# Patient Record
Sex: FEMALE | Race: WHITE | NOT HISPANIC OR LATINO | Employment: FULL TIME | URBAN - METROPOLITAN AREA
[De-identification: names, ages, dates, MRNs, and addresses within clinical notes are randomized per-mention and may not be internally consistent; named-entity substitution may affect disease eponyms.]

---

## 2017-10-26 ENCOUNTER — HOSPITAL ENCOUNTER (EMERGENCY)
Facility: MEDICAL CENTER | Age: 42
End: 2017-10-26
Attending: EMERGENCY MEDICINE
Payer: COMMERCIAL

## 2017-10-26 VITALS
SYSTOLIC BLOOD PRESSURE: 102 MMHG | OXYGEN SATURATION: 98 % | WEIGHT: 166.23 LBS | TEMPERATURE: 97.4 F | BODY MASS INDEX: 25.19 KG/M2 | HEIGHT: 68 IN | RESPIRATION RATE: 16 BRPM | HEART RATE: 60 BPM | DIASTOLIC BLOOD PRESSURE: 52 MMHG

## 2017-10-26 LAB — GFR SERPL CREATININE-BSD FRML MDRD: >60 ML/MIN/1.73 M 2

## 2017-10-26 PROCEDURE — 80053 COMPREHEN METABOLIC PANEL: CPT

## 2017-10-26 PROCEDURE — 99284 EMERGENCY DEPT VISIT MOD MDM: CPT

## 2017-10-26 PROCEDURE — 87389 HIV-1 AG W/HIV-1&-2 AB AG IA: CPT

## 2017-10-26 PROCEDURE — 87340 HEPATITIS B SURFACE AG IA: CPT

## 2017-10-26 PROCEDURE — 86803 HEPATITIS C AB TEST: CPT

## 2017-10-26 PROCEDURE — 86706 HEP B SURFACE ANTIBODY: CPT

## 2017-10-26 PROCEDURE — 85027 COMPLETE CBC AUTOMATED: CPT

## 2017-10-26 PROCEDURE — 86704 HEP B CORE ANTIBODY TOTAL: CPT

## 2017-10-26 PROCEDURE — 36415 COLL VENOUS BLD VENIPUNCTURE: CPT

## 2017-10-26 ASSESSMENT — ENCOUNTER SYMPTOMS: FEVER: 0

## 2017-10-26 NOTE — LETTER
"  FORM C-4:  EMPLOYEE’S CLAIM FOR COMPENSATION/ REPORT OF INITIAL TREATMENT  EMPLOYEE’S CLAIM - PROVIDE ALL INFORMATION REQUESTED   First Name  Madelyn Last Name  Clarisse Birthdate             Age  1975 42 y.o. Sex  female Claim Number   Home Employee Address  [address]  [facility] [facility]                                     Zip  [address] Height  1.727 m (5' 8\") Weight  75.4 kg (166 lb 3.6 oz) [facility]     Mailing Employee Address                           [address]   [facility] [facility]               Zip  [address] Telephone  [phone] (home)  Primary Language Spoken  ENGLISH   Insurer  Unable to Obtain Third Party   MISC WORKERS COMP Employee's Occupation (Job Title) When Injury or Occupational Disease Occurred  All Positions   Employer's Name  [employer] Telephone  [phone]    Employer Address  [address] [employer] [place] [facility] Zip  [address]   Date of Injury  10/26/2017       Hour of Injury  8:20 PM Date Employer Notified  10/26/2017 Last Day of Work after Injury or Occupational Disease  10/26/2017 Supervisor to Whom Injury Reported  n/a   Address or Location of Accident (if applicable)  [employer] ([place]   What were you doing at the time of accident? (if applicable)  Changing Trashes    How did this injury or occupational disease occur? Be specific and answer in detail. Use additional sheet if necessary)  syringe stuck me under nail   If you believe that you have an occupational disease, when did you first have knowledge of the disability and it relationship to your employment?  n/a Witnesses to the Accident  n/a     Nature of Injury or Occupational Disease  Puncture  Part(s) of Body Injured or Affected  Finger (L), N/A, N/A    I certify that the above is true and correct to the best of my knowledge and that I have provided this information in order to obtain the benefits of [place]  I hereby authorize any physician, chiropractor, surgeon, practitioner, or other person, any hospital, including [facility] or [facility] hospital, any medical service organization, any insurance company, or other institution or organization to release to each other, any medical or other information, including benefits paid or payable, pertinent to this injury or disease, except information relative to diagnosis, treatment and/or counseling for AIDS, psychological conditions, alcohol or controlled substances, for which I must give specific authorization.  A Photostat of this authorization shall be as valid as the original.   Date  10/26/17 Place  [facility] Employee’s Signature   THIS REPORT MUST BE COMPLETED AND MAILED WITHIN 3 WORKING DAYS OF TREATMENT   Place  [facility], EMERGENCY DEPT  Name of Facility   [facility]   Date  10/26/2017 Diagnosis  (W27.3XXA) Needlestick injury accident, initial encounter Is there evidence the injured employee was under the influence of alcohol and/or another controlled substance at the time of accident?   Hour  11:05 PM Description of Injury or Disease  Needlestick injury accident, initial encounter No   Treatment  labs  Have you advised the patient to remain off work five days or more?         No   X-Ray Findings      If Yes   From Date    To Date      From information given by the employee, together with medical evidence, can you directly connect this injury or occupational disease as job incurred?  Yes If No, is the employee capable of: Full Duty  Yes Modified Duty      Is additional medical care by a physician indicated?  Yes If Modified Duty, Specify any Limitations / Restrictions        Do you know of any previous injury or disease contributing to this condition or occupational disease?  No   Date  10/26/2017 Print Doctor’s Name  Aileen Willard I certify the employer’s copy of  "this form was mailed on:   Address  [address]  [place] [id number] [phone] Insurer’s Use Only   [facility] Zip  [address]    Provider’s Tax ID Number    Telephone  Dept: [phone]    Doctor’s Signature  e-SignMUMARY MIN M.D. Degree   MD    Original - TREATING PHYSICIAN OR CHIROPRACTOR   Pg 2-Insurer/TPA   Pg 3-Employer   Pg 4-Employee                                                                                                  Form C-4 (rev01/03)     BRIEF DESCRIPTION OF RIGHTS AND BENEFITS  (Pursuant to [place] 616[place])    Notice of Injury or Occupational Disease (Incident Report Form C-1): If an injury or occupational disease (OD) arises out of and in the course of employment, you must provide written notice to your employer as soon as practicable, but no later than 7 days after the accident or OD. Your employer shall maintain a sufficient supply of the required forms.    Claim for Compensation (Form C-4): If medical treatment is sought, the form C-4 is available at the place of initial treatment. A completed \"Claim for Compensation\" (Form C-4) must be filed within 90 days after an accident or OD. The treating physician or chiropractor must, within 3 working days after treatment, complete and mail to the employer, the employer's insurer and third-party , the Claim for Compensation.    Medical Treatment: If you require medical treatment for your on-the-job injury or OD, you may be required to select a physician or chiropractor from a list provided by your workers’ compensation insurer, if it has contracted with an Organization for Managed Care (MCO) or Preferred Provider Organization (PPO) or providers of health care. If your employer has not entered into a contract with an MCO or PPO, you may select a physician or chiropractor from the Panel of Physicians and Chiropractors. Any medical costs related to your industrial injury or OD will be paid by your " insurer.    Temporary Total Disability (TTD): If your doctor has certified that you are unable to work for a period of at least 5 consecutive days, or 5 cumulative days in a 20-day period, or places restrictions on you that your employer does not accommodate, you may be entitled to TTD compensation.    Temporary Partial Disability (TPD): If the wage you receive upon reemployment is less than the compensation for TTD to which you are entitled, the insurer may be required to pay you TPD compensation to make up the difference. TPD can only be paid for a maximum of 24 months.    Permanent Partial Disability (PPD): When your medical condition is stable and there is an indication of a PPD as a result of your injury or OD, within 30 days, your insurer must arrange for an evaluation by a rating physician or chiropractor to determine the degree of your PPD. The amount of your PPD award depends on the date of injury, the results of the PPD evaluation and your age and wage.    Permanent Total Disability (PTD): If you are medically certified by a treating physician or chiropractor as permanently and totally disabled and have been granted a PTD status by your insurer, you are entitled to receive monthly benefits not to exceed 66 2/3% of your average monthly wage. The amount of your PTD payments is subject to reduction if you previously received a PPD award.    Vocational Rehabilitation Services: You may be eligible for vocational rehabilitation services if you are unable to return to the job due to a permanent physical impairment or permanent restrictions as a result of your injury or occupational disease.    Transportation and Per Joslyn Reimbursement: You may be eligible for travel expenses and per joslyn associated with medical treatment.  Reopening: You may be able to reopen your claim if your condition worsens after claim closure.    Appeal Process: If you disagree with a written determination issued by the insurer or the  insurer does not respond to your request, you may appeal to the Department of Administration, , by following the instructions contained in your determination letter. You must appeal the determination within 70 days from the date of the determination letter at [address], [address], [place], [place] [address], or [address], [address], [place], [place] [address]. If you disagree with the  decision, you may appeal to the Department of Administration, . You must file your appeal within 30 days from the date of the  decision letter at [address], [address], [place], [place] [address], or [address], [address], [place], [place] [address]. If you disagree with a decision of an , you may file a petition for judicial review with the District Court. You must do so within 30 days of the Appeal Officer’s decision. You may be represented by an  at your own expense or you may contact the [facility] for possible representation.    [place]  for Injured Workers (NAIW): If you disagree with a  decision, you may request that NAIW represent you without charge at an  Hearing. For information regarding denial of benefits, you may contact the [facility] at: [address], [address], [place] [address], [phone], or [address], [place] [address], [phone]    To File a Complaint with the Division: If you wish to file a complaint with the  of the Division of Industrial Relations (DIR), please contact the Workers’ Compensation Section, [address], [address], [place], [place] [address], telephone [phone], or [address], [place] [address], telephone [phone].    For assistance with Workers’ Compensation Issues: you may contact the Office of the [facility] Consumer Health Assistance, [address], [address], [place], [place] [address], Toll Free 1-987.466.3496, Web site: http://gómez.[facility].[email or web address]., E-mail [email or web address].                                                                                                                                                                               __________________________________________________________________                                    _________________            Employee Name / Signature                                                                                                                            Date                                       D-2 (rev. 10/07)

## 2017-10-27 LAB
ALBUMIN SERPL BCP-MCNC: 4 G/DL (ref 3.2–4.9)
ALBUMIN/GLOB SERPL: 1.5 G/DL
ALP SERPL-CCNC: 67 U/L (ref 30–99)
ALT SERPL-CCNC: <5 U/L (ref 2–50)
ANION GAP SERPL CALC-SCNC: 9 MMOL/L (ref 0–11.9)
AST SERPL-CCNC: 10 U/L (ref 12–45)
BILIRUB SERPL-MCNC: 0.3 MG/DL (ref 0.1–1.5)
BUN SERPL-MCNC: 8 MG/DL (ref 8–22)
CALCIUM SERPL-MCNC: 9 MG/DL (ref 8.5–10.5)
CHLORIDE SERPL-SCNC: 106 MMOL/L (ref 96–112)
CO2 SERPL-SCNC: 22 MMOL/L (ref 20–33)
CREAT SERPL-MCNC: 0.64 MG/DL (ref 0.5–1.4)
ERYTHROCYTE [DISTWIDTH] IN BLOOD BY AUTOMATED COUNT: 35.4 FL (ref 35.9–50)
GLOBULIN SER CALC-MCNC: 2.6 G/DL (ref 1.9–3.5)
GLUCOSE SERPL-MCNC: 100 MG/DL (ref 65–99)
HBV CORE AB SERPL QL IA: NEGATIVE
HBV SURFACE AB SERPL IA-ACNC: <3.1 MIU/ML (ref 0–10)
HBV SURFACE AG SER QL: NEGATIVE
HCT VFR BLD AUTO: 33.1 % (ref 37–47)
HCV AB SER QL: NEGATIVE
HGB BLD-MCNC: 10.3 G/DL (ref 12–16)
HIV 1+2 AB+HIV1 P24 AG SERPL QL IA: NON REACTIVE
MCH RBC QN AUTO: 20.5 PG (ref 27–33)
MCHC RBC AUTO-ENTMCNC: 31.1 G/DL (ref 33.6–35)
MCV RBC AUTO: 65.9 FL (ref 81.4–97.8)
PLATELET # BLD AUTO: 191 K/UL (ref 164–446)
PMV BLD AUTO: 10 FL (ref 9–12.9)
POTASSIUM SERPL-SCNC: 4.1 MMOL/L (ref 3.6–5.5)
PROT SERPL-MCNC: 6.6 G/DL (ref 6–8.2)
RBC # BLD AUTO: 5.02 M/UL (ref 4.2–5.4)
SODIUM SERPL-SCNC: 137 MMOL/L (ref 135–145)
WBC # BLD AUTO: 4.6 K/UL (ref 4.8–10.8)

## 2017-10-27 NOTE — ED PROVIDER NOTES
ED Provider Note    Scribed for Aileen Willard M.D. by Shahnaz Miller. 10/26/2017, 10:17 PM.    Means of arrival: Walk-in  History obtained from: Patient  History limited by: None    CHIEF COMPLAINT  Chief Complaint   Patient presents with   • Body Fluid Exposure     bathroom trash at work, pt saw the needle        HPI  Madelyn Robb is a 42 y.o. female who presents to the Emergency Department with a possible body fluid exposure. The patient works at [employer] and was gathering trash bags from the bathroom at work when she was stuck under her right third fingernail with a needle. The patient felt the needle stick and then saw a syringe in the trash.She did not bleed herself. She immediately removed her gloves, flushed the area with hot water, and washed with soap. She denies fever, rash, or other symptoms. She has no underlying known hepatitis or HIV.    REVIEW OF SYSTEMS  Review of Systems   Constitutional: Negative for fever.   Skin: Negative for rash.        Positive for needle stick   E.    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    SURGICAL HISTORY   has a past surgical history that includes open reduction; appendectomy; abdominal exploration; cholecystectomy; and tubal coagulation laparoscopic bilateral.    SOCIAL HISTORY  Social History   Substance Use Topics   • Smoking status: Current Every Day Smoker     Packs/day: 0.50      "Years: 25.00   • Smokeless tobacco: Not on file   • Alcohol use No      Comment: denies h/o heavy use      History   Drug Use No     Comment: denies h/o heroin, cocaine, heroin, IVDU       FAMILY HISTORY  Family History   Problem Relation Age of Onset   • Psychiatry Mother    • Alcohol/Drug Mother    • Hypertension Mother    • Cancer Father        CURRENT MEDICATIONS  Reviewed.  See Encounter Summary.     ALLERGIES  Allergies   Allergen Reactions   • Dilantin [Phenytoin Sodium]    • Flexeril [Cyclobenzaprine Hcl]    • Keflex    • Methadone    • Mushroom Extract Complex    • Penicillins    • Rondec    • Sulfa Drugs    • Tegretol [Carbamazepine]    • Ultracef [Cefadroxil Monohydrate]        PHYSICAL EXAM  VITAL SIGNS: /76   Pulse 76   Temp 36.3 °C (97.4 °F) (Temporal)   Resp 16   Ht 1.727 m (5' 8\")   Wt 75.4 kg (166 lb 3.6 oz)   SpO2 98%   BMI 25.27 kg/m²   Vitals reviewed by myself.  Physical Exam  Nursing note and vitals reviewed.  Constitutional: Well-developed and well-nourished. No acute distress.   HENT: Head is normocephalic and atraumatic.  Eyes: extra-ocular movements intact  Cardiovascular: Regular rate and regular rhythm. No murmur heard.  Pulmonary/Chest: Breath sounds normal. No wheezes or rales.   Neurological: Awake and alert  Skin: Skin is warm and dry. No rash. No obvious injuries noted       DIAGNOSTIC STUDIES /  LABS  Labs pending    REASSESSMENT  10:17 PM Patient seen and examined at bedside. I explained to the patient I will order the body fluid exposure protocol labs to evaluate, and she will be discharged home. She consented to this plan of care.    COURSE & MEDICAL DECISION MAKING  Nursing notes, VS, PMSFHx reviewed in chart.    Patient is a 42-year-old female who comes in for needle stick injury. Differential diagnosis includes HIV, hepatitis, needlestick injury. Diagnostic workup includes bodily fluid exposure labs.    Patient's initial vitals are within normal limits. She has no " obvious signs of trauma on her digits. I advised patient that we'll draw bodily fluid exposure labs, and that she will require follow-up with occupational health. Patient has to work tomorrow and does not want to wait for the results of her labs, but agrees to follow-up with occupational health. Therefore labs are drawn and patient is discharged home. She is given strict return fashions prior to discharge. I was able to follow-up with her labs after patient was discharged and noted that her hepatitis panel and HIV were negative.    The patient will return for new or worsening symptoms and is stable at the time of discharge.    The patient is referred to a primary physician for blood pressure management, diabetic screening, and for all other preventative health concerns.    DISPOSITION:  Patient will be discharged home in stable condition.    FOLLOW UP:  [address] [address]  [phone]      call tomorrow to schedule follow-up appointment        FINAL IMPRESSION  1. Needlestick injury accident, initial encounter          IShahnaz (Scribe), am scribing for, and in the presence of, Aileen Willard M.D..    Electronically signed by: Shahnaz Miller (Cherrieibciara), 10/26/2017    IAileen M.D. personally performed the services described in this documentation, as scribed by Shahnaz Miller in my presence, and it is both accurate and complete.    The note accurately reflects work and decisions made by me.  Aileen Willard  10/27/2017  3:19 AM

## 2017-10-27 NOTE — ED NOTES
D/C home with written and verbal instructions re: Rx, activity, f/u.  Verbalizes understanding. Given work note and follow up information. Ambulated out

## 2017-10-27 NOTE — ED NOTES
"Triage notes    Pt was stuck with a needle when taking at trash out work states she saw the needle. Pt has workers comp paperwork .     .  Chief Complaint   Patient presents with   • Body Fluid Exposure     bathroom trash at work, pt saw the needle      ./76   Pulse 76   Temp 36.3 °C (97.4 °F) (Temporal)   Resp 16   Ht 1.727 m (5' 8\")   Wt 75.4 kg (166 lb 3.6 oz)   SpO2 98%   BMI 25.27 kg/m²     "

## 2017-11-14 ENCOUNTER — HOSPITAL ENCOUNTER (EMERGENCY)
Facility: MEDICAL CENTER | Age: 42
End: 2017-11-14
Attending: EMERGENCY MEDICINE
Payer: MEDICAID

## 2017-11-14 VITALS
HEART RATE: 71 BPM | TEMPERATURE: 97.4 F | SYSTOLIC BLOOD PRESSURE: 107 MMHG | OXYGEN SATURATION: 96 % | BODY MASS INDEX: 23.87 KG/M2 | HEIGHT: 69 IN | RESPIRATION RATE: 18 BRPM | DIASTOLIC BLOOD PRESSURE: 65 MMHG | WEIGHT: 161.16 LBS

## 2017-11-14 DIAGNOSIS — I87.2 VENOUS STASIS DERMATITIS OF LEFT LOWER EXTREMITY: ICD-10-CM

## 2017-11-14 PROCEDURE — 99283 EMERGENCY DEPT VISIT LOW MDM: CPT

## 2017-11-14 ASSESSMENT — PAIN SCALES - GENERAL: PAINLEVEL_OUTOF10: 3

## 2017-11-14 ASSESSMENT — LIFESTYLE VARIABLES: DO YOU DRINK ALCOHOL: NO

## 2017-11-14 ASSESSMENT — ENCOUNTER SYMPTOMS
FEVER: 0
FOCAL WEAKNESS: 1

## 2017-11-14 NOTE — ED NOTES
Chief Complaint   Patient presents with   • Leg Pain     Pt reports to have been at [facility] for left lower leg redness/swelling three days ago. pt reports being placed on cephalexin, pt reports now feeling dry skin all over/ no relief with moisterizers/ pt has left leg graft 89'   • Leg Swelling     Explained to pt triage process, made pt aware to tell this RN of any changes/concerns, pt verbalized understanding of process and instructions given. Pt to ER lobby.

## 2017-11-14 NOTE — DISCHARGE INSTRUCTIONS
Venous Stasis or Chronic Venous Insufficiency  Chronic venous insufficiency, also called venous stasis, is a condition that affects the veins in the legs. The condition prevents blood from being pumped through these veins effectively. Blood may no longer be pumped effectively from the legs back to the heart. This condition can range from mild to severe. With proper treatment, you should be able to continue with an active life.  CAUSES   Chronic venous insufficiency occurs when the vein walls become stretched, weakened, or damaged or when valves within the vein are damaged. Some common causes of this include:  · High blood pressure inside the veins (venous hypertension).  · Increased blood pressure in the leg veins from long periods of sitting or standing.  · A blood clot that blocks blood flow in a vein (deep vein thrombosis).  · Inflammation of a superficial vein (phlebitis) that causes a blood clot to form.  RISK FACTORS  Various things can make you more likely to develop chronic venous insufficiency, including:  · Family history of this condition.  · Obesity.  · Pregnancy.  · Sedentary lifestyle.  · Smoking.  · Jobs requiring long periods of standing or sitting in one place.  · Being a certain age. Women in their 40s and 50s and men in their 70s are more likely to develop this condition.  SIGNS AND SYMPTOMS   Symptoms may include:   · Varicose veins.  · Skin breakdown or ulcers.  · Reddened or discolored skin on the leg.  · Brown, smooth, tight, and painful skin just above the ankle, usually on the inside surface (lipodermatosclerosis).  · Swelling.  DIAGNOSIS   To diagnose this condition, your health care provider will take a medical history and do a physical exam. The following tests may be ordered to confirm the diagnosis:  · Duplex ultrasound--A procedure that produces a picture of a blood vessel and nearby organs and also provides information on blood flow through the blood vessel.  · Plethysmography--A  "procedure that tests blood flow.  · A venogram, or venography--A procedure used to look at the veins using X-ray and dye.  TREATMENT  The goals of treatment are to help you return to an active life and to minimize pain or disability. Treatment will depend on the severity of the condition. Medical procedures may be needed for severe cases. Treatment options may include:   · Use of compression stockings. These can help with symptoms and lower the chances of the problem getting worse, but they do not cure the problem.  · Sclerotherapy--A procedure involving an injection of a material that \"dissolves\" the damaged veins. Other veins in the network of blood vessels take over the function of the damaged veins.  · Surgery to remove the vein or cut off blood flow through the vein (vein stripping or laser ablation surgery).  · Surgery to repair a valve.  HOME CARE INSTRUCTIONS   · Wear compression stockings as directed by your health care provider.  · Only take over-the-counter or prescription medicines for pain, discomfort, or fever as directed by your health care provider.  · Follow up with your health care provider as directed.  SEEK MEDICAL CARE IF:   · You have redness, swelling, or increasing pain in the affected area.  · You see a red streak or line that extends up or down from the affected area.  · You have a breakdown or loss of skin in the affected area, even if the breakdown is small.  · You have an injury to the affected area.  SEEK IMMEDIATE MEDICAL CARE IF:   · You have an injury and open wound in the affected area.  · Your pain is severe and does not improve with medicine.  · You have sudden numbness or weakness in the foot or ankle below the affected area, or you have trouble moving your foot or ankle.  · You have a fever or persistent symptoms for more than 2-3 days.  · You have a fever and your symptoms suddenly get worse.  MAKE SURE YOU:   · Understand these instructions.  · Will watch your " condition.  · Will get help right away if you are not doing well or get worse.     This information is not intended to replace advice given to you by your health care provider. Make sure you discuss any questions you have with your health care provider.     Document Released: 04/22/2008 Document Revised: 10/08/2014 Document Reviewed: 08/25/2014  [employer] Patient Education ©2016 [employer]  Contact Dermatitis  Contact dermatitis is a reaction to certain substances that touch the skin. Contact dermatitis can be either irritant contact dermatitis or allergic contact dermatitis. Irritant contact dermatitis does not require previous exposure to the substance for a reaction to occur. Allergic contact dermatitis only occurs if you have been exposed to the substance before. Upon a repeat exposure, your body reacts to the substance.   CAUSES   Many substances can cause contact dermatitis. Irritant dermatitis is most commonly caused by repeated exposure to mildly irritating substances, such as:  · Makeup.  · Soaps.  · Detergents.  · Bleaches.  · Acids.  · Metal salts, such as nickel.  Allergic contact dermatitis is most commonly caused by exposure to:  · Poisonous plants.  · Chemicals (deodorants, shampoos).  · Jewelry.  · Latex.  · Neomycin in triple antibiotic cream.  · Preservatives in products, including clothing.  SYMPTOMS   The area of skin that is exposed may develop:  · Dryness or flaking.  · Redness.  · Cracks.  · Itching.  · Pain or a burning sensation.  · Blisters.  With allergic contact dermatitis, there may also be swelling in areas such as the eyelids, mouth, or genitals.   DIAGNOSIS   Your caregiver can usually tell what the problem is by doing a physical exam. In cases where the cause is uncertain and an allergic contact dermatitis is suspected, a patch skin test may be performed to help determine the cause of your dermatitis.  TREATMENT  Treatment includes protecting the skin from further  "contact with the irritating substance by avoiding that substance if possible. Barrier creams, powders, and gloves may be helpful. Your caregiver may also recommend:  · Steroid creams or ointments applied 2 times daily. For best results, soak the rash area in cool water for 20 minutes. Then apply the medicine. Cover the area with a plastic wrap. You can store the steroid cream in the refrigerator for a \"chilly\" effect on your rash. That may decrease itching. Oral steroid medicines may be needed in more severe cases.  · Antibiotics or antibacterial ointments if a skin infection is present.  · Antihistamine lotion or an antihistamine taken by mouth to ease itching.  · Lubricants to keep moisture in your skin.  · Burow's solution to reduce redness and soreness or to dry a weeping rash. Mix one packet or tablet of solution in 2 cups cool water. Dip a clean washcloth in the mixture, wring it out a bit, and put it on the affected area. Leave the cloth in place for 30 minutes. Do this as often as possible throughout the day.  · Taking several cornstarch or baking soda baths daily if the area is too large to cover with a washcloth.  Harsh chemicals, such as alkalis or acids, can cause skin damage that is like a burn. You should flush your skin for 15 to 20 minutes with cold water after such an exposure. You should also seek immediate medical care after exposure. Bandages (dressings), antibiotics, and pain medicine may be needed for severely irritated skin.   HOME CARE INSTRUCTIONS  · Avoid the substance that caused your reaction.  · Keep the area of skin that is affected away from hot water, soap, sunlight, chemicals, acidic substances, or anything else that would irritate your skin.  · Do not scratch the rash. Scratching may cause the rash to become infected.  · You may take cool baths to help stop the itching.  · Only take over-the-counter or prescription medicines as directed by your caregiver.  · See your caregiver for " follow-up care as directed to make sure your skin is healing properly.  SEEK MEDICAL CARE IF:   · Your condition is not better after 3 days of treatment.  · You seem to be getting worse.  · You see signs of infection such as swelling, tenderness, redness, soreness, or warmth in the affected area.  · You have any problems related to your medicines.     This information is not intended to replace advice given to you by your health care provider. Make sure you discuss any questions you have with your health care provider.     Document Released: 12/15/2001 Document Revised: 03/11/2013 Document Reviewed: 05/22/2012  [employer] Patient Education ©2016 [employer]
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
## 2018-01-31 ENCOUNTER — HOSPITAL ENCOUNTER (EMERGENCY)
Facility: MEDICAL CENTER | Age: 43
End: 2018-01-31
Payer: MEDICAID

## 2018-01-31 VITALS
WEIGHT: 155.65 LBS | TEMPERATURE: 97.5 F | HEIGHT: 68 IN | HEART RATE: 65 BPM | SYSTOLIC BLOOD PRESSURE: 116 MMHG | RESPIRATION RATE: 16 BRPM | OXYGEN SATURATION: 95 % | BODY MASS INDEX: 23.59 KG/M2 | DIASTOLIC BLOOD PRESSURE: 69 MMHG

## 2018-01-31 LAB
ALBUMIN SERPL BCP-MCNC: 4.6 G/DL (ref 3.2–4.9)
ALBUMIN/GLOB SERPL: 2.4 G/DL
ALP SERPL-CCNC: 85 U/L (ref 30–99)
ALT SERPL-CCNC: 9 U/L (ref 2–50)
ANION GAP SERPL CALC-SCNC: 10 MMOL/L (ref 0–11.9)
APTT PPP: 20 SEC (ref 24.7–36)
AST SERPL-CCNC: 18 U/L (ref 12–45)
BASOPHILS # BLD AUTO: 0.7 % (ref 0–1.8)
BASOPHILS # BLD: 0.03 K/UL (ref 0–0.12)
BILIRUB SERPL-MCNC: 0.5 MG/DL (ref 0.1–1.5)
BNP SERPL-MCNC: 7 PG/ML (ref 0–100)
BUN SERPL-MCNC: 6 MG/DL (ref 8–22)
CALCIUM SERPL-MCNC: 8.7 MG/DL (ref 8.5–10.5)
CHLORIDE SERPL-SCNC: 108 MMOL/L (ref 96–112)
CO2 SERPL-SCNC: 18 MMOL/L (ref 20–33)
CREAT SERPL-MCNC: 0.5 MG/DL (ref 0.5–1.4)
EKG IMPRESSION: NORMAL
EOSINOPHIL # BLD AUTO: 0.23 K/UL (ref 0–0.51)
EOSINOPHIL NFR BLD: 5 % (ref 0–6.9)
ERYTHROCYTE [DISTWIDTH] IN BLOOD BY AUTOMATED COUNT: 36.2 FL (ref 35.9–50)
GLOBULIN SER CALC-MCNC: 1.9 G/DL (ref 1.9–3.5)
GLUCOSE SERPL-MCNC: 86 MG/DL (ref 65–99)
HCT VFR BLD AUTO: 36.3 % (ref 37–47)
HGB BLD-MCNC: 11.3 G/DL (ref 12–16)
IMM GRANULOCYTES # BLD AUTO: 0.01 K/UL (ref 0–0.11)
IMM GRANULOCYTES NFR BLD AUTO: 0.2 % (ref 0–0.9)
INR PPP: 0.95 (ref 0.87–1.13)
LIPASE SERPL-CCNC: 31 U/L (ref 11–82)
LYMPHOCYTES # BLD AUTO: 1.95 K/UL (ref 1–4.8)
LYMPHOCYTES NFR BLD: 42.4 % (ref 22–41)
MCH RBC QN AUTO: 21 PG (ref 27–33)
MCHC RBC AUTO-ENTMCNC: 31.1 G/DL (ref 33.6–35)
MCV RBC AUTO: 67.6 FL (ref 81.4–97.8)
MONOCYTES # BLD AUTO: 0.36 K/UL (ref 0–0.85)
MONOCYTES NFR BLD AUTO: 7.8 % (ref 0–13.4)
NEUTROPHILS # BLD AUTO: 2.02 K/UL (ref 2–7.15)
NEUTROPHILS NFR BLD: 43.9 % (ref 44–72)
NRBC # BLD AUTO: 0 K/UL
NRBC BLD-RTO: 0 /100 WBC
PLATELET # BLD AUTO: 214 K/UL (ref 164–446)
PMV BLD AUTO: 10.2 FL (ref 9–12.9)
POTASSIUM SERPL-SCNC: 4.2 MMOL/L (ref 3.6–5.5)
PROT SERPL-MCNC: 6.5 G/DL (ref 6–8.2)
PROTHROMBIN TIME: 12.4 SEC (ref 12–14.6)
RBC # BLD AUTO: 5.37 M/UL (ref 4.2–5.4)
SODIUM SERPL-SCNC: 136 MMOL/L (ref 135–145)
TROPONIN I SERPL-MCNC: <0.01 NG/ML (ref 0–0.04)
WBC # BLD AUTO: 4.6 K/UL (ref 4.8–10.8)

## 2018-01-31 PROCEDURE — 85730 THROMBOPLASTIN TIME PARTIAL: CPT

## 2018-01-31 PROCEDURE — 84484 ASSAY OF TROPONIN QUANT: CPT

## 2018-01-31 PROCEDURE — 85610 PROTHROMBIN TIME: CPT

## 2018-01-31 PROCEDURE — 83880 ASSAY OF NATRIURETIC PEPTIDE: CPT

## 2018-01-31 PROCEDURE — 93005 ELECTROCARDIOGRAM TRACING: CPT

## 2018-01-31 PROCEDURE — 80053 COMPREHEN METABOLIC PANEL: CPT

## 2018-01-31 PROCEDURE — 85025 COMPLETE CBC W/AUTO DIFF WBC: CPT

## 2018-01-31 PROCEDURE — 302449 STATCHG TRIAGE ONLY (STATISTIC)

## 2018-01-31 PROCEDURE — 83690 ASSAY OF LIPASE: CPT

## 2018-02-01 NOTE — ED NOTES
"Pt slammed down  BP cuff and said \"Im leaving\", advised pt that she needed to sign AMA, pt stated \"I Don't have to sign shit\" and walked away.  "

## 2018-05-03 ENCOUNTER — HOSPITAL ENCOUNTER (EMERGENCY)
Facility: MEDICAL CENTER | Age: 43
End: 2018-05-03
Attending: EMERGENCY MEDICINE

## 2018-05-03 ENCOUNTER — APPOINTMENT (OUTPATIENT)
Dept: RADIOLOGY | Facility: MEDICAL CENTER | Age: 43
End: 2018-05-03
Attending: EMERGENCY MEDICINE

## 2018-05-03 VITALS
RESPIRATION RATE: 16 BRPM | WEIGHT: 150 LBS | BODY MASS INDEX: 22.73 KG/M2 | HEIGHT: 68 IN | HEART RATE: 75 BPM | DIASTOLIC BLOOD PRESSURE: 65 MMHG | OXYGEN SATURATION: 99 % | TEMPERATURE: 97 F | SYSTOLIC BLOOD PRESSURE: 104 MMHG

## 2018-05-03 DIAGNOSIS — E05.90 HYPERTHYROIDISM: ICD-10-CM

## 2018-05-03 LAB
ALBUMIN SERPL BCP-MCNC: 4.1 G/DL (ref 3.2–4.9)
ALBUMIN/GLOB SERPL: 1.6 G/DL
ALP SERPL-CCNC: 88 U/L (ref 30–99)
ALT SERPL-CCNC: 8 U/L (ref 2–50)
ANION GAP SERPL CALC-SCNC: 8 MMOL/L (ref 0–11.9)
AST SERPL-CCNC: 12 U/L (ref 12–45)
BASOPHILS # BLD AUTO: 0.6 % (ref 0–1.8)
BASOPHILS # BLD: 0.03 K/UL (ref 0–0.12)
BILIRUB SERPL-MCNC: 0.5 MG/DL (ref 0.1–1.5)
BUN SERPL-MCNC: 11 MG/DL (ref 8–22)
CALCIUM SERPL-MCNC: 9.1 MG/DL (ref 8.5–10.5)
CHLORIDE SERPL-SCNC: 103 MMOL/L (ref 96–112)
CHOLEST SERPL-MCNC: 146 MG/DL (ref 100–199)
CO2 SERPL-SCNC: 24 MMOL/L (ref 20–33)
CREAT SERPL-MCNC: 0.45 MG/DL (ref 0.5–1.4)
CRP SERPL HS-MCNC: 3.9 MG/L (ref 0–7.5)
EOSINOPHIL # BLD AUTO: 0.23 K/UL (ref 0–0.51)
EOSINOPHIL NFR BLD: 4.2 % (ref 0–6.9)
ERYTHROCYTE [DISTWIDTH] IN BLOOD BY AUTOMATED COUNT: 32.8 FL (ref 35.9–50)
ERYTHROCYTE [SEDIMENTATION RATE] IN BLOOD BY WESTERGREN METHOD: 12 MM/HOUR (ref 0–20)
EST. AVERAGE GLUCOSE BLD GHB EST-MCNC: 103 MG/DL
GLOBULIN SER CALC-MCNC: 2.6 G/DL (ref 1.9–3.5)
GLUCOSE SERPL-MCNC: 83 MG/DL (ref 65–99)
HBA1C MFR BLD: 5.2 % (ref 0–5.6)
HCT VFR BLD AUTO: 33.4 % (ref 37–47)
HDLC SERPL-MCNC: 40 MG/DL
HGB BLD-MCNC: 10.7 G/DL (ref 12–16)
IMM GRANULOCYTES # BLD AUTO: 0.01 K/UL (ref 0–0.11)
IMM GRANULOCYTES NFR BLD AUTO: 0.2 % (ref 0–0.9)
LDLC SERPL CALC-MCNC: 80 MG/DL
LYMPHOCYTES # BLD AUTO: 1.48 K/UL (ref 1–4.8)
LYMPHOCYTES NFR BLD: 27.2 % (ref 22–41)
MCH RBC QN AUTO: 20.6 PG (ref 27–33)
MCHC RBC AUTO-ENTMCNC: 32 G/DL (ref 33.6–35)
MCV RBC AUTO: 64.4 FL (ref 81.4–97.8)
MONOCYTES # BLD AUTO: 0.32 K/UL (ref 0–0.85)
MONOCYTES NFR BLD AUTO: 5.9 % (ref 0–13.4)
NEUTROPHILS # BLD AUTO: 3.38 K/UL (ref 2–7.15)
NEUTROPHILS NFR BLD: 61.9 % (ref 44–72)
NRBC # BLD AUTO: 0 K/UL
NRBC BLD-RTO: 0 /100 WBC
PLATELET # BLD AUTO: 204 K/UL (ref 164–446)
PMV BLD AUTO: 9.6 FL (ref 9–12.9)
POTASSIUM SERPL-SCNC: 3.9 MMOL/L (ref 3.6–5.5)
PROT SERPL-MCNC: 6.7 G/DL (ref 6–8.2)
RBC # BLD AUTO: 5.19 M/UL (ref 4.2–5.4)
SODIUM SERPL-SCNC: 135 MMOL/L (ref 135–145)
T4 FREE SERPL-MCNC: 1.29 NG/DL (ref 0.53–1.43)
TRIGL SERPL-MCNC: 132 MG/DL (ref 0–149)
TSH SERPL DL<=0.005 MIU/L-ACNC: 0.01 UIU/ML (ref 0.38–5.33)
WBC # BLD AUTO: 5.5 K/UL (ref 4.8–10.8)

## 2018-05-03 PROCEDURE — 99284 EMERGENCY DEPT VISIT MOD MDM: CPT

## 2018-05-03 PROCEDURE — 76536 US EXAM OF HEAD AND NECK: CPT

## 2018-05-03 PROCEDURE — 80053 COMPREHEN METABOLIC PANEL: CPT

## 2018-05-03 PROCEDURE — 83036 HEMOGLOBIN GLYCOSYLATED A1C: CPT

## 2018-05-03 PROCEDURE — 700102 HCHG RX REV CODE 250 W/ 637 OVERRIDE(OP): Performed by: EMERGENCY MEDICINE

## 2018-05-03 PROCEDURE — 96374 THER/PROPH/DIAG INJ IV PUSH: CPT

## 2018-05-03 PROCEDURE — 85652 RBC SED RATE AUTOMATED: CPT

## 2018-05-03 PROCEDURE — 85025 COMPLETE CBC W/AUTO DIFF WBC: CPT

## 2018-05-03 PROCEDURE — 83520 IMMUNOASSAY QUANT NOS NONAB: CPT

## 2018-05-03 PROCEDURE — 84443 ASSAY THYROID STIM HORMONE: CPT

## 2018-05-03 PROCEDURE — 80061 LIPID PANEL: CPT

## 2018-05-03 PROCEDURE — 700111 HCHG RX REV CODE 636 W/ 250 OVERRIDE (IP): Performed by: EMERGENCY MEDICINE

## 2018-05-03 PROCEDURE — 86141 C-REACTIVE PROTEIN HS: CPT

## 2018-05-03 PROCEDURE — A9270 NON-COVERED ITEM OR SERVICE: HCPCS | Performed by: EMERGENCY MEDICINE

## 2018-05-03 PROCEDURE — 84439 ASSAY OF FREE THYROXINE: CPT

## 2018-05-03 RX ORDER — ONDANSETRON 4 MG/1
4 TABLET, ORALLY DISINTEGRATING ORAL EVERY 8 HOURS PRN
Qty: 10 TAB | Refills: 0 | Status: SHIPPED | OUTPATIENT
Start: 2018-05-03 | End: 2018-10-16

## 2018-05-03 RX ORDER — IBUPROFEN 600 MG/1
600 TABLET ORAL ONCE
Status: COMPLETED | OUTPATIENT
Start: 2018-05-03 | End: 2018-05-03

## 2018-05-03 RX ORDER — ONDANSETRON 2 MG/ML
4 INJECTION INTRAMUSCULAR; INTRAVENOUS ONCE
Status: COMPLETED | OUTPATIENT
Start: 2018-05-03 | End: 2018-05-03

## 2018-05-03 RX ADMIN — ONDANSETRON HYDROCHLORIDE 4 MG: 2 INJECTION, SOLUTION INTRAMUSCULAR; INTRAVENOUS at 17:05

## 2018-05-03 RX ADMIN — IBUPROFEN 600 MG: 600 TABLET, FILM COATED ORAL at 17:55

## 2018-05-03 ASSESSMENT — PAIN SCALES - GENERAL
PAINLEVEL_OUTOF10: 7
PAINLEVEL_OUTOF10: ASSUMED PAIN PRESENT
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
## 2018-05-03 NOTE — ED NOTES
Vitals taken again, pressure lower.  Triage RN and Charge RN aware, expecting a room to be available soon

## 2018-05-03 NOTE — ED TRIAGE NOTES
Pt comes in complaining of eye bulging for months. Pt stating changes in vision today. Pt also reporting a hoarse voice.  Pt reporting plan to see endocrinologist.

## 2018-05-04 NOTE — DISCHARGE INSTRUCTIONS
Thyroid Diseases  Reduce your levothyroxine dose to one half tablet per day. Follow-up with Dr. Barksdale.  Call [phone] for an early appointment. Return for persistent heart rate over 120, fevers, anxiety, ill appearance.    Your thyroid is a butterfly-shaped gland in your neck. It is located just above your collarbone. It is one of your endocrine glands, which make hormones. The thyroid helps set your metabolism. Metabolism is how your body gets energy from the foods you eat.   Millions of people have thyroid diseases. Women experience thyroid problems more often than men. In fact, overactive thyroid problems (hyperthyroidism) occur in 1% of all women. If you have a thyroid disease, your body may use energy more slowly or quickly than it should.   Thyroid problems also include an immune disease where your body reacts against your thyroid gland (called thyroiditis). A different problem involves lumps and bumps (called nodules) that develop in the gland. The nodules are usually, but not always, noncancerous.  THE MOST COMMON THYROID PROBLEMS AND CAUSES ARE DISCUSSED BELOW  There are many causes for thyroid problems. Treatment depends upon the exact diagnosis and includes trying to reset your body's metabolism to a normal rate.  Hyperthyroidism  Too much thyroid hormone from an overactive thyroid gland is called hyperthyroidism. In hyperthyroidism, the body's metabolism speeds up. One of the most frequent forms of hyperthyroidism is known as Graves' disease. Graves' disease tends to run in families. Although Graves' is thought to be caused by a problem with the immune system, the exact nature of the genetic problem is unknown.  Hypothyroidism  Too little thyroid hormone from an underactive thyroid gland is called hypothyroidism. In hypothyroidism, the body's metabolism is slowed. Several things can cause this condition. Most causes affect the thyroid gland directly and hurt its ability to make enough hormone.   Rarely,  there may be a pituitary gland tumor (located near the base of the brain). The tumor can block the pituitary from producing thyroid-stimulating hormone (TSH). Your body makes TSH to stimulate the thyroid to work properly. If the pituitary does not make enough TSH, the thyroid fails to make enough hormones needed for good health.  Whether the problem is caused by thyroid conditions or by the pituitary gland, the result is that the thyroid is not making enough hormones. Hypothyroidism causes many physical and mental processes to become sluggish. The body consumes less oxygen and produces less body heat.  Thyroid Nodules  A thyroid nodule is a small swelling or lump in the thyroid gland. They are common. These nodules represent either a growth of thyroid tissue or a fluid-filled cyst. Both form a lump in the thyroid gland. Almost half of all people will have tiny thyroid nodules at some point in their lives. Typically, these are not noticeable until they become large and affect normal thyroid size. Larger nodules that are greater than a half inch across (about 1 centimeter) occur in about 5 percent of people.  Although most nodules are not cancerous, people who have them should seek medical care to rule out cancer. Also, some thyroid nodules may produce too much thyroid hormone or become too large. Large nodules or a large gland can interfere with breathing or swallowing or may cause neck discomfort.  Other problems  Other thyroid problems include cancer and thyroiditis. Thyroiditis is a malfunction of the body's immune system. Normally, the immune system works to defend the body against infection and other problems. When the immune system is not working properly, it may mistakenly attack normal cells, tissues, and organs. Examples of autoimmune diseases are Hashimoto's thyroiditis (which causes low thyroid function) and Graves' disease (which causes excess thyroid function).  SYMPTOMS   Symptoms vary greatly depending  upon the exact type of problem with the thyroid.  Hyperthyroidism-is when your thyroid is too active and makes more thyroid hormone than your body needs. The most common cause is Graves' Disease. Too much thyroid hormone can cause some or all of the following symptoms:  · Anxiety.   · Irritability.   · Difficulty sleeping.   · Fatigue.   · A rapid or irregular heartbeat.   · A fine tremor of your hands or fingers.   · An increase in perspiration.   · Sensitivity to heat.   · Weight loss, despite normal food intake.   · Brittle hair.   · Enlargement of your thyroid gland (goiter).   · Light menstrual periods.   · Frequent bowel movements.   Graves' disease can specifically cause eye and skin problems. The skin problems involve reddening and swelling of the skin, often on your shins and on the top of your feet. Eye problems can include the following:  · Excess tearing and sensation of grit or sand in either or both eyes.   · Reddened or inflamed eyes.   · Widening of the space between your eyelids.   · Swelling of the lids and tissues around the eyes.   · Light sensitivity.   · Ulcers on the cornea.   · Double vision.   · Limited eye movements.   · Blurred or reduced vision.   Hypothyroidism- is when your thyroid gland is not active enough. This is more common than hyperthyroidism. Symptoms can vary a lot depending of the severity of the hormone deficiency. Symptoms may develop over a long period of time and can include several of the following:  · Fatigue.   · Sluggishness.   · Increased sensitivity to cold.   · Constipation.   · Pale, dry skin.   · A puffy face.   · Hoarse voice.   · High blood cholesterol level.   · Unexplained weight gain.   · Muscle aches, tenderness and stiffness.   · Pain, stiffness or swelling in your joints.   · Muscle weakness.   · Heavier than normal menstrual periods.   · Brittle fingernails and hair.   · Depression.   Thyroid Nodules - most do not cause signs or symptoms. Occasionally,  some may become so large that you can feel or even see the swelling at the base of your neck. You may realize a lump or swelling is there when you are shaving or putting on makeup. Men might become aware of a nodule when shirt collars suddenly feel too tight.  Some nodules produce too much thyroid hormone. This can produce the same symptoms as hyperthyroidism (see above).  Thyroid nodules are seldom cancerous. However, a nodule is more likely to be malignant (cancerous) if it:  · Grows quickly or feels hard.   · Causes you to become hoarse or to have trouble swallowing or breathing.   · Causes enlarged lymph nodes under your jaw or in your neck.   DIAGNOSIS   Because there are so many possible thyroid conditions, your caregiver may ask for a number of tests. They will do this in order to narrow down the exact diagnosis. These tests can include:  · Blood and antibody tests.   · Special thyroid scans using small, safe amounts of radioactive iodine.   · Ultrasound of the thyroid gland (particularly if there is a nodule or lump).   · Biopsy. This is usually done with a special needle. A needle biopsy is a procedure to obtain a sample of cells from the thyroid. The tissue will be tested in a lab and examined under a microscope.   TREATMENT   Treatment depends on the exact diagnosis.  Hyperthyroidism  · Beta-blockers help relieve many of the symptoms.   · Anti-thyroid medications prevent the thyroid from making excess hormones.   · Radioactive iodine treatment can destroy overactive thyroid cells. The iodine can permanently decrease the amount of hormone produced.   · Surgery to remove the thyroid gland.   · Treatments for eye problems that come from Graves' disease also include medications and special eye surgery, if felt to be appropriate.   Hypothyroidism  Thyroid replacement with levothyroxine is the mainstay of treatment. Treatment with thyroid replacement is usually lifelong and will require monitoring and  adjustment from time to time.  Thyroid Nodules  · Watchful waiting. If a small nodule causes no symptoms or signs of cancer on biopsy, then no treatment may be chosen at first. Re-exam and re-checking blood tests would be the recommended follow-up.   · Anti-thyroid medications or radioactive iodine treatment may be recommended if the nodules produce too much thyroid hormone (see Treatment for Hyperthyroidism above).   · Alcohol ablation. Injections of small amounts of ethyl alcohol (ethanol) can cause a non-cancerous nodule to shrink in size.   · Surgery (see Treatment for Hyperthyroidism above).   HOME CARE INSTRUCTIONS   · Take medications as instructed.   · Follow through on recommended testing.   SEEK MEDICAL CARE IF:   · You feel that you are developing symptoms of Hyperthyroidism or Hypothyroidism as described above.   · You develop a new lump/nodule in the neck/thyroid area that you had not noticed before.   · You feel that you are having side effects from medicines prescribed.   · You develop trouble breathing or swallowing.   SEEK IMMEDIATE MEDICAL CARE IF:   · You develop a fever of 102° F (38.9° C) or higher.   · You develop severe sweating.   · You develop palpitations and/or rapid heart beat.   · You develop shortness of breath.   · You develop nausea and vomiting.   · You develop extreme shakiness.   · You develop agitation.   · You develop lightheadedness or have a fainting episode.   Document Released: 10/14/2008 Document Revised: 03/11/2013 Document Reviewed: 10/14/2008  [employer]® Patient Information ©2013 [employer].

## 2018-05-04 NOTE — ED NOTES
Pt ambulated out of ED with steady gait with significant other. Pt provided with discharge instructions. Questions answered. Prescription handed to pt. Verbalizes understanding. All belongings accounted for.

## 2018-05-07 LAB — MISCELLANEOUS LAB RESULT MISCLAB: ABNORMAL

## 2018-10-16 ENCOUNTER — HOSPITAL ENCOUNTER (INPATIENT)
Facility: MEDICAL CENTER | Age: 43
LOS: 5 days | DRG: 871 | End: 2018-10-21
Attending: EMERGENCY MEDICINE | Admitting: INTERNAL MEDICINE
Payer: MEDICAID

## 2018-10-16 ENCOUNTER — APPOINTMENT (OUTPATIENT)
Dept: RADIOLOGY | Facility: MEDICAL CENTER | Age: 43
DRG: 871 | End: 2018-10-16
Attending: INTERNAL MEDICINE
Payer: MEDICAID

## 2018-10-16 ENCOUNTER — APPOINTMENT (OUTPATIENT)
Dept: RADIOLOGY | Facility: MEDICAL CENTER | Age: 43
DRG: 871 | End: 2018-10-16
Attending: EMERGENCY MEDICINE
Payer: MEDICAID

## 2018-10-16 ENCOUNTER — APPOINTMENT (OUTPATIENT)
Dept: CARDIOLOGY | Facility: MEDICAL CENTER | Age: 43
DRG: 871 | End: 2018-10-16
Attending: INTERNAL MEDICINE
Payer: MEDICAID

## 2018-10-16 DIAGNOSIS — R05.9 COUGH: ICD-10-CM

## 2018-10-16 DIAGNOSIS — A41.9 SEVERE SEPSIS (HCC): ICD-10-CM

## 2018-10-16 DIAGNOSIS — R09.02 HYPOXIA: ICD-10-CM

## 2018-10-16 DIAGNOSIS — R57.9 SHOCK (HCC): ICD-10-CM

## 2018-10-16 DIAGNOSIS — I21.4 NON-ST ELEVATION (NSTEMI) MYOCARDIAL INFARCTION (HCC): ICD-10-CM

## 2018-10-16 DIAGNOSIS — J96.01 ACUTE RESPIRATORY FAILURE WITH HYPOXIA (HCC): ICD-10-CM

## 2018-10-16 DIAGNOSIS — R65.20 SEVERE SEPSIS (HCC): ICD-10-CM

## 2018-10-16 DIAGNOSIS — R91.8 PULMONARY INFILTRATE: ICD-10-CM

## 2018-10-16 PROBLEM — D72.829 LEUKOCYTOSIS: Status: ACTIVE | Noted: 2018-10-16

## 2018-10-16 PROBLEM — R79.89 ELEVATED TROPONIN: Status: ACTIVE | Noted: 2018-10-16

## 2018-10-16 LAB
ACTION RANGE TRIGGERED IACRT: NO
ALBUMIN SERPL BCP-MCNC: 3.8 G/DL (ref 3.2–4.9)
ALBUMIN/GLOB SERPL: 1.5 G/DL
ALP SERPL-CCNC: 72 U/L (ref 30–99)
ALT SERPL-CCNC: 8 U/L (ref 2–50)
ANION GAP SERPL CALC-SCNC: 10 MMOL/L (ref 0–11.9)
ANION GAP SERPL CALC-SCNC: 4 MMOL/L (ref 0–11.9)
APPEARANCE UR: ABNORMAL
AST SERPL-CCNC: 29 U/L (ref 12–45)
BACTERIA #/AREA URNS HPF: ABNORMAL /HPF
BASE EXCESS BLDA CALC-SCNC: -1 MMOL/L (ref -4–3)
BASOPHILS # BLD AUTO: 0.1 % (ref 0–1.8)
BASOPHILS # BLD: 0.02 K/UL (ref 0–0.12)
BILIRUB SERPL-MCNC: 1.5 MG/DL (ref 0.1–1.5)
BILIRUB UR QL STRIP.AUTO: NEGATIVE
BNP SERPL-MCNC: 96 PG/ML (ref 0–100)
BODY TEMPERATURE: NORMAL DEGREES
BUN SERPL-MCNC: 13 MG/DL (ref 8–22)
BUN SERPL-MCNC: 18 MG/DL (ref 8–22)
C PNEUM DNA SPEC QL NAA+PROBE: NOT DETECTED
CALCIUM SERPL-MCNC: 7.9 MG/DL (ref 8.5–10.5)
CALCIUM SERPL-MCNC: 8.8 MG/DL (ref 8.5–10.5)
CHLORIDE SERPL-SCNC: 107 MMOL/L (ref 96–112)
CHLORIDE SERPL-SCNC: 108 MMOL/L (ref 96–112)
CO2 BLDA-SCNC: 24 MMOL/L (ref 20–33)
CO2 SERPL-SCNC: 22 MMOL/L (ref 20–33)
CO2 SERPL-SCNC: 25 MMOL/L (ref 20–33)
COLOR UR: ABNORMAL
CREAT SERPL-MCNC: 0.64 MG/DL (ref 0.5–1.4)
CREAT SERPL-MCNC: 0.64 MG/DL (ref 0.5–1.4)
EKG IMPRESSION: NORMAL
EKG IMPRESSION: NORMAL
EOSINOPHIL # BLD AUTO: 0.08 K/UL (ref 0–0.51)
EOSINOPHIL NFR BLD: 0.5 % (ref 0–6.9)
EPI CELLS #/AREA URNS HPF: ABNORMAL /HPF
ERYTHROCYTE [DISTWIDTH] IN BLOOD BY AUTOMATED COUNT: 38.8 FL (ref 35.9–50)
FLUAV H1 2009 PAND RNA SPEC QL NAA+PROBE: NOT DETECTED
FLUAV H1 RNA SPEC QL NAA+PROBE: NOT DETECTED
FLUAV H3 RNA SPEC QL NAA+PROBE: NOT DETECTED
FLUAV RNA SPEC QL NAA+PROBE: NEGATIVE
FLUAV RNA SPEC QL NAA+PROBE: NOT DETECTED
FLUBV RNA SPEC QL NAA+PROBE: NEGATIVE
FLUBV RNA SPEC QL NAA+PROBE: NOT DETECTED
GLOBULIN SER CALC-MCNC: 2.5 G/DL (ref 1.9–3.5)
GLUCOSE SERPL-MCNC: 121 MG/DL (ref 65–99)
GLUCOSE SERPL-MCNC: 134 MG/DL (ref 65–99)
GLUCOSE UR STRIP.AUTO-MCNC: NEGATIVE MG/DL
HADV DNA SPEC QL NAA+PROBE: NOT DETECTED
HCO3 BLDA-SCNC: 22.9 MMOL/L (ref 17–25)
HCOV RNA SPEC QL NAA+PROBE: NOT DETECTED
HCT VFR BLD AUTO: 34.7 % (ref 37–47)
HGB BLD-MCNC: 11.2 G/DL (ref 12–16)
HMPV RNA SPEC QL NAA+PROBE: NOT DETECTED
HOROWITZ INDEX BLDA+IHG-RTO: 94 MM[HG]
HPIV1 RNA SPEC QL NAA+PROBE: NOT DETECTED
HPIV2 RNA SPEC QL NAA+PROBE: NOT DETECTED
HPIV3 RNA SPEC QL NAA+PROBE: NOT DETECTED
HPIV4 RNA SPEC QL NAA+PROBE: NOT DETECTED
IMM GRANULOCYTES # BLD AUTO: 0.09 K/UL (ref 0–0.11)
IMM GRANULOCYTES NFR BLD AUTO: 0.6 % (ref 0–0.9)
INST. QUALIFIED PATIENT IIQPT: YES
KETONES UR STRIP.AUTO-MCNC: 15 MG/DL
LACTATE BLD-SCNC: 1.7 MMOL/L (ref 0.5–2)
LEUKOCYTE ESTERASE UR QL STRIP.AUTO: NEGATIVE
LIPASE SERPL-CCNC: 3 U/L (ref 11–82)
LV EJECT FRACT  99904: 55
LV EJECT FRACT MOD 4C 99902: 56.42
LYMPHOCYTES # BLD AUTO: 1.13 K/UL (ref 1–4.8)
LYMPHOCYTES NFR BLD: 7.3 % (ref 22–41)
M PNEUMO DNA SPEC QL NAA+PROBE: NOT DETECTED
MCH RBC QN AUTO: 21.3 PG (ref 27–33)
MCHC RBC AUTO-ENTMCNC: 32.3 G/DL (ref 33.6–35)
MCV RBC AUTO: 66 FL (ref 81.4–97.8)
MICRO URNS: ABNORMAL
MONOCYTES # BLD AUTO: 0.22 K/UL (ref 0–0.85)
MONOCYTES NFR BLD AUTO: 1.4 % (ref 0–13.4)
MUCOUS THREADS #/AREA URNS HPF: ABNORMAL /HPF
NEUTROPHILS # BLD AUTO: 14.02 K/UL (ref 2–7.15)
NEUTROPHILS NFR BLD: 90.1 % (ref 44–72)
NITRITE UR QL STRIP.AUTO: NEGATIVE
NRBC # BLD AUTO: 0 K/UL
NRBC BLD-RTO: 0 /100 WBC
O2/TOTAL GAS SETTING VFR VENT: 80 %
PCO2 BLDA: 34.1 MMHG (ref 26–37)
PH BLDA: 7.43 [PH] (ref 7.4–7.5)
PH UR STRIP.AUTO: 6 [PH]
PLATELET # BLD AUTO: 150 K/UL (ref 164–446)
PMV BLD AUTO: 9.8 FL (ref 9–12.9)
PO2 BLDA: 75 MMHG (ref 64–87)
POTASSIUM SERPL-SCNC: 3.6 MMOL/L (ref 3.6–5.5)
POTASSIUM SERPL-SCNC: 3.6 MMOL/L (ref 3.6–5.5)
PROT SERPL-MCNC: 6.3 G/DL (ref 6–8.2)
PROT UR QL STRIP: 100 MG/DL
RBC # BLD AUTO: 5.26 M/UL (ref 4.2–5.4)
RBC # URNS HPF: ABNORMAL /HPF
RBC UR QL AUTO: NEGATIVE
RSV A RNA SPEC QL NAA+PROBE: NOT DETECTED
RSV B RNA SPEC QL NAA+PROBE: NOT DETECTED
RV+EV RNA SPEC QL NAA+PROBE: NOT DETECTED
SAO2 % BLDA: 95 % (ref 93–99)
SODIUM SERPL-SCNC: 137 MMOL/L (ref 135–145)
SODIUM SERPL-SCNC: 139 MMOL/L (ref 135–145)
SP GR UR STRIP.AUTO: 1.03
SPECIMEN DRAWN FROM PATIENT: NORMAL
T4 FREE SERPL-MCNC: 1.06 NG/DL (ref 0.53–1.43)
TROPONIN I SERPL-MCNC: 1.95 NG/ML (ref 0–0.04)
TROPONIN I SERPL-MCNC: 2.78 NG/ML (ref 0–0.04)
TSH SERPL DL<=0.005 MIU/L-ACNC: 0.13 UIU/ML (ref 0.38–5.33)
UROBILINOGEN UR STRIP.AUTO-MCNC: 1 MG/DL
WBC # BLD AUTO: 15.6 K/UL (ref 4.8–10.8)
WBC #/AREA URNS HPF: ABNORMAL /HPF

## 2018-10-16 PROCEDURE — 80053 COMPREHEN METABOLIC PANEL: CPT

## 2018-10-16 PROCEDURE — 770022 HCHG ROOM/CARE - ICU (200)

## 2018-10-16 PROCEDURE — 94640 AIRWAY INHALATION TREATMENT: CPT

## 2018-10-16 PROCEDURE — 71275 CT ANGIOGRAPHY CHEST: CPT

## 2018-10-16 PROCEDURE — 700117 HCHG RX CONTRAST REV CODE 255: Performed by: INTERNAL MEDICINE

## 2018-10-16 PROCEDURE — 96365 THER/PROPH/DIAG IV INF INIT: CPT

## 2018-10-16 PROCEDURE — 99291 CRITICAL CARE FIRST HOUR: CPT | Mod: 25 | Performed by: INTERNAL MEDICINE

## 2018-10-16 PROCEDURE — 96375 TX/PRO/DX INJ NEW DRUG ADDON: CPT

## 2018-10-16 PROCEDURE — 36600 WITHDRAWAL OF ARTERIAL BLOOD: CPT

## 2018-10-16 PROCEDURE — A9270 NON-COVERED ITEM OR SERVICE: HCPCS | Performed by: INTERNAL MEDICINE

## 2018-10-16 PROCEDURE — 81001 URINALYSIS AUTO W/SCOPE: CPT

## 2018-10-16 PROCEDURE — 84439 ASSAY OF FREE THYROXINE: CPT

## 2018-10-16 PROCEDURE — 700101 HCHG RX REV CODE 250

## 2018-10-16 PROCEDURE — 700101 HCHG RX REV CODE 250: Performed by: INTERNAL MEDICINE

## 2018-10-16 PROCEDURE — 700102 HCHG RX REV CODE 250 W/ 637 OVERRIDE(OP): Performed by: INTERNAL MEDICINE

## 2018-10-16 PROCEDURE — 83880 ASSAY OF NATRIURETIC PEPTIDE: CPT

## 2018-10-16 PROCEDURE — 700111 HCHG RX REV CODE 636 W/ 250 OVERRIDE (IP): Performed by: INTERNAL MEDICINE

## 2018-10-16 PROCEDURE — 84443 ASSAY THYROID STIM HORMONE: CPT

## 2018-10-16 PROCEDURE — 71045 X-RAY EXAM CHEST 1 VIEW: CPT

## 2018-10-16 PROCEDURE — 93005 ELECTROCARDIOGRAM TRACING: CPT | Performed by: EMERGENCY MEDICINE

## 2018-10-16 PROCEDURE — 85025 COMPLETE CBC W/AUTO DIFF WBC: CPT

## 2018-10-16 PROCEDURE — 80048 BASIC METABOLIC PNL TOTAL CA: CPT

## 2018-10-16 PROCEDURE — 87581 M.PNEUMON DNA AMP PROBE: CPT

## 2018-10-16 PROCEDURE — 87633 RESP VIRUS 12-25 TARGETS: CPT

## 2018-10-16 PROCEDURE — 87040 BLOOD CULTURE FOR BACTERIA: CPT | Mod: 91

## 2018-10-16 PROCEDURE — 36415 COLL VENOUS BLD VENIPUNCTURE: CPT

## 2018-10-16 PROCEDURE — 99291 CRITICAL CARE FIRST HOUR: CPT | Performed by: INTERNAL MEDICINE

## 2018-10-16 PROCEDURE — 700105 HCHG RX REV CODE 258: Performed by: EMERGENCY MEDICINE

## 2018-10-16 PROCEDURE — 94002 VENT MGMT INPAT INIT DAY: CPT

## 2018-10-16 PROCEDURE — C1751 CATH, INF, PER/CENT/MIDLINE: HCPCS

## 2018-10-16 PROCEDURE — 99223 1ST HOSP IP/OBS HIGH 75: CPT | Performed by: INTERNAL MEDICINE

## 2018-10-16 PROCEDURE — 83605 ASSAY OF LACTIC ACID: CPT

## 2018-10-16 PROCEDURE — 02HV33Z INSERTION OF INFUSION DEVICE INTO SUPERIOR VENA CAVA, PERCUTANEOUS APPROACH: ICD-10-PCS | Performed by: INTERNAL MEDICINE

## 2018-10-16 PROCEDURE — 36556 INSERT NON-TUNNEL CV CATH: CPT

## 2018-10-16 PROCEDURE — 82803 BLOOD GASES ANY COMBINATION: CPT

## 2018-10-16 PROCEDURE — 84484 ASSAY OF TROPONIN QUANT: CPT

## 2018-10-16 PROCEDURE — 83690 ASSAY OF LIPASE: CPT

## 2018-10-16 PROCEDURE — 99291 CRITICAL CARE FIRST HOUR: CPT

## 2018-10-16 PROCEDURE — 93306 TTE W/DOPPLER COMPLETE: CPT

## 2018-10-16 PROCEDURE — 36556 INSERT NON-TUNNEL CV CATH: CPT | Mod: RT | Performed by: INTERNAL MEDICINE

## 2018-10-16 PROCEDURE — 87486 CHLMYD PNEUM DNA AMP PROBE: CPT

## 2018-10-16 PROCEDURE — B548ZZA ULTRASONOGRAPHY OF SUPERIOR VENA CAVA, GUIDANCE: ICD-10-PCS | Performed by: INTERNAL MEDICINE

## 2018-10-16 PROCEDURE — 93005 ELECTROCARDIOGRAM TRACING: CPT | Performed by: INTERNAL MEDICINE

## 2018-10-16 PROCEDURE — 94760 N-INVAS EAR/PLS OXIMETRY 1: CPT

## 2018-10-16 PROCEDURE — 93010 ELECTROCARDIOGRAM REPORT: CPT | Performed by: INTERNAL MEDICINE

## 2018-10-16 PROCEDURE — 700111 HCHG RX REV CODE 636 W/ 250 OVERRIDE (IP): Performed by: EMERGENCY MEDICINE

## 2018-10-16 PROCEDURE — 87502 INFLUENZA DNA AMP PROBE: CPT

## 2018-10-16 RX ORDER — METHYLPREDNISOLONE SODIUM SUCCINATE 125 MG/2ML
125 INJECTION, POWDER, LYOPHILIZED, FOR SOLUTION INTRAMUSCULAR; INTRAVENOUS ONCE
Status: COMPLETED | OUTPATIENT
Start: 2018-10-16 | End: 2018-10-16

## 2018-10-16 RX ORDER — IBUPROFEN 600 MG/1
600 TABLET ORAL EVERY 6 HOURS PRN
Status: DISCONTINUED | OUTPATIENT
Start: 2018-10-16 | End: 2018-10-17

## 2018-10-16 RX ORDER — FUROSEMIDE 10 MG/ML
20 INJECTION INTRAMUSCULAR; INTRAVENOUS ONCE
Status: DISCONTINUED | OUTPATIENT
Start: 2018-10-16 | End: 2018-10-16

## 2018-10-16 RX ORDER — ATORVASTATIN CALCIUM 40 MG/1
40 TABLET, FILM COATED ORAL EVERY EVENING
Status: DISCONTINUED | OUTPATIENT
Start: 2018-10-16 | End: 2018-10-21 | Stop reason: HOSPADM

## 2018-10-16 RX ORDER — SODIUM CHLORIDE 9 MG/ML
30 INJECTION, SOLUTION INTRAVENOUS ONCE
Status: COMPLETED | OUTPATIENT
Start: 2018-10-16 | End: 2018-10-16

## 2018-10-16 RX ORDER — LEVOTHYROXINE SODIUM 0.03 MG/1
62.5 TABLET ORAL DAILY
Status: DISCONTINUED | OUTPATIENT
Start: 2018-10-17 | End: 2018-10-21 | Stop reason: HOSPADM

## 2018-10-16 RX ORDER — ONDANSETRON 2 MG/ML
4 INJECTION INTRAMUSCULAR; INTRAVENOUS EVERY 4 HOURS PRN
Status: DISCONTINUED | OUTPATIENT
Start: 2018-10-16 | End: 2018-10-21 | Stop reason: HOSPADM

## 2018-10-16 RX ORDER — TIOTROPIUM BROMIDE 18 UG/1
1 CAPSULE ORAL; RESPIRATORY (INHALATION)
Status: DISCONTINUED | OUTPATIENT
Start: 2018-10-16 | End: 2018-10-16

## 2018-10-16 RX ORDER — GABAPENTIN 300 MG/1
600 CAPSULE ORAL 3 TIMES DAILY
Status: DISCONTINUED | OUTPATIENT
Start: 2018-10-16 | End: 2018-10-21 | Stop reason: HOSPADM

## 2018-10-16 RX ORDER — FUROSEMIDE 40 MG/1
20 TABLET ORAL ONCE
Status: COMPLETED | OUTPATIENT
Start: 2018-10-16 | End: 2018-10-16

## 2018-10-16 RX ORDER — ACETAMINOPHEN 325 MG/1
650 TABLET ORAL EVERY 6 HOURS PRN
Status: DISCONTINUED | OUTPATIENT
Start: 2018-10-16 | End: 2018-10-21 | Stop reason: HOSPADM

## 2018-10-16 RX ORDER — PROMETHAZINE HYDROCHLORIDE 25 MG/1
12.5-25 TABLET ORAL EVERY 4 HOURS PRN
Status: DISCONTINUED | OUTPATIENT
Start: 2018-10-16 | End: 2018-10-21 | Stop reason: HOSPADM

## 2018-10-16 RX ORDER — ALPRAZOLAM 0.5 MG/1
1 TABLET ORAL 3 TIMES DAILY PRN
Status: DISCONTINUED | OUTPATIENT
Start: 2018-10-16 | End: 2018-10-21 | Stop reason: HOSPADM

## 2018-10-16 RX ORDER — PROMETHAZINE HYDROCHLORIDE 25 MG/1
12.5-25 SUPPOSITORY RECTAL EVERY 4 HOURS PRN
Status: DISCONTINUED | OUTPATIENT
Start: 2018-10-16 | End: 2018-10-21 | Stop reason: HOSPADM

## 2018-10-16 RX ORDER — ALBUTEROL SULFATE 90 UG/1
2 AEROSOL, METERED RESPIRATORY (INHALATION)
Status: DISCONTINUED | OUTPATIENT
Start: 2018-10-16 | End: 2018-10-17 | Stop reason: ALTCHOICE

## 2018-10-16 RX ORDER — FUROSEMIDE 10 MG/ML
20 INJECTION INTRAMUSCULAR; INTRAVENOUS ONCE
Status: COMPLETED | OUTPATIENT
Start: 2018-10-16 | End: 2018-10-16

## 2018-10-16 RX ORDER — IPRATROPIUM BROMIDE AND ALBUTEROL SULFATE 2.5; .5 MG/3ML; MG/3ML
SOLUTION RESPIRATORY (INHALATION)
Status: COMPLETED
Start: 2018-10-16 | End: 2018-10-16

## 2018-10-16 RX ORDER — IPRATROPIUM BROMIDE AND ALBUTEROL SULFATE 2.5; .5 MG/3ML; MG/3ML
3 SOLUTION RESPIRATORY (INHALATION)
Status: DISCONTINUED | OUTPATIENT
Start: 2018-10-16 | End: 2018-10-18

## 2018-10-16 RX ORDER — LEVOTHYROXINE SODIUM 0.12 MG/1
62.5 TABLET ORAL DAILY
Status: ON HOLD | COMMUNITY
End: 2018-11-05

## 2018-10-16 RX ORDER — BISACODYL 10 MG
10 SUPPOSITORY, RECTAL RECTAL
Status: DISCONTINUED | OUTPATIENT
Start: 2018-10-16 | End: 2018-10-21 | Stop reason: HOSPADM

## 2018-10-16 RX ORDER — ASPIRIN 325 MG
325 TABLET ORAL DAILY
Status: DISCONTINUED | OUTPATIENT
Start: 2018-10-16 | End: 2018-10-21 | Stop reason: HOSPADM

## 2018-10-16 RX ORDER — BUDESONIDE AND FORMOTEROL FUMARATE DIHYDRATE 160; 4.5 UG/1; UG/1
2 AEROSOL RESPIRATORY (INHALATION)
Status: DISCONTINUED | OUTPATIENT
Start: 2018-10-16 | End: 2018-10-16

## 2018-10-16 RX ORDER — LEVOFLOXACIN 5 MG/ML
750 INJECTION, SOLUTION INTRAVENOUS EVERY 24 HOURS
Status: DISCONTINUED | OUTPATIENT
Start: 2018-10-17 | End: 2018-10-19

## 2018-10-16 RX ORDER — LEVOFLOXACIN 5 MG/ML
750 INJECTION, SOLUTION INTRAVENOUS ONCE
Status: COMPLETED | OUTPATIENT
Start: 2018-10-16 | End: 2018-10-16

## 2018-10-16 RX ORDER — SODIUM CHLORIDE 9 MG/ML
500 INJECTION, SOLUTION INTRAVENOUS
Status: DISCONTINUED | OUTPATIENT
Start: 2018-10-16 | End: 2018-10-16

## 2018-10-16 RX ORDER — KETOROLAC TROMETHAMINE 30 MG/ML
15-30 INJECTION, SOLUTION INTRAMUSCULAR; INTRAVENOUS EVERY 6 HOURS PRN
Status: DISCONTINUED | OUTPATIENT
Start: 2018-10-16 | End: 2018-10-17

## 2018-10-16 RX ORDER — AMOXICILLIN 250 MG
2 CAPSULE ORAL 2 TIMES DAILY
Status: DISCONTINUED | OUTPATIENT
Start: 2018-10-16 | End: 2018-10-21 | Stop reason: HOSPADM

## 2018-10-16 RX ORDER — TRAMADOL HYDROCHLORIDE 50 MG/1
50 TABLET ORAL ONCE
Status: DISCONTINUED | OUTPATIENT
Start: 2018-10-16 | End: 2018-10-17

## 2018-10-16 RX ORDER — METHYLPREDNISOLONE SODIUM SUCCINATE 40 MG/ML
40 INJECTION, POWDER, LYOPHILIZED, FOR SOLUTION INTRAMUSCULAR; INTRAVENOUS EVERY 6 HOURS
Status: DISCONTINUED | OUTPATIENT
Start: 2018-10-16 | End: 2018-10-17

## 2018-10-16 RX ORDER — TRAZODONE HYDROCHLORIDE 100 MG/1
100 TABLET ORAL NIGHTLY PRN
COMMUNITY
End: 2019-08-09

## 2018-10-16 RX ORDER — SODIUM CHLORIDE 9 MG/ML
INJECTION, SOLUTION INTRAVENOUS CONTINUOUS
Status: DISCONTINUED | OUTPATIENT
Start: 2018-10-16 | End: 2018-10-16

## 2018-10-16 RX ORDER — KETOROLAC TROMETHAMINE 30 MG/ML
30 INJECTION, SOLUTION INTRAMUSCULAR; INTRAVENOUS ONCE
Status: COMPLETED | OUTPATIENT
Start: 2018-10-16 | End: 2018-10-16

## 2018-10-16 RX ORDER — TRAZODONE HYDROCHLORIDE 100 MG/1
100 TABLET ORAL NIGHTLY PRN
Status: DISCONTINUED | OUTPATIENT
Start: 2018-10-16 | End: 2018-10-21 | Stop reason: HOSPADM

## 2018-10-16 RX ORDER — ONDANSETRON 4 MG/1
4 TABLET, ORALLY DISINTEGRATING ORAL EVERY 4 HOURS PRN
Status: DISCONTINUED | OUTPATIENT
Start: 2018-10-16 | End: 2018-10-21 | Stop reason: HOSPADM

## 2018-10-16 RX ORDER — POLYETHYLENE GLYCOL 3350 17 G/17G
1 POWDER, FOR SOLUTION ORAL
Status: DISCONTINUED | OUTPATIENT
Start: 2018-10-16 | End: 2018-10-21 | Stop reason: HOSPADM

## 2018-10-16 RX ORDER — OMEPRAZOLE 40 MG/1
40 CAPSULE, DELAYED RELEASE ORAL 2 TIMES DAILY
COMMUNITY
End: 2021-02-24

## 2018-10-16 RX ORDER — SODIUM CHLORIDE 9 MG/ML
30 INJECTION, SOLUTION INTRAVENOUS
Status: DISCONTINUED | OUTPATIENT
Start: 2018-10-16 | End: 2018-10-16

## 2018-10-16 RX ADMIN — FUROSEMIDE 20 MG: 40 TABLET ORAL at 11:57

## 2018-10-16 RX ADMIN — IPRATROPIUM BROMIDE AND ALBUTEROL SULFATE 3 ML: .5; 3 SOLUTION RESPIRATORY (INHALATION) at 08:54

## 2018-10-16 RX ADMIN — IPRATROPIUM BROMIDE AND ALBUTEROL SULFATE 3 ML: .5; 3 SOLUTION RESPIRATORY (INHALATION) at 18:46

## 2018-10-16 RX ADMIN — SODIUM CHLORIDE 2259 ML: 9 INJECTION, SOLUTION INTRAVENOUS at 08:58

## 2018-10-16 RX ADMIN — METHYLPREDNISOLONE SODIUM SUCCINATE 40 MG: 40 INJECTION, POWDER, FOR SOLUTION INTRAMUSCULAR; INTRAVENOUS at 18:30

## 2018-10-16 RX ADMIN — ATORVASTATIN CALCIUM 40 MG: 40 TABLET, FILM COATED ORAL at 16:55

## 2018-10-16 RX ADMIN — LEVOFLOXACIN 750 MG: 5 INJECTION, SOLUTION INTRAVENOUS at 09:49

## 2018-10-16 RX ADMIN — GABAPENTIN 600 MG: 300 CAPSULE ORAL at 11:56

## 2018-10-16 RX ADMIN — METHYLPREDNISOLONE SODIUM SUCCINATE 40 MG: 40 INJECTION, POWDER, FOR SOLUTION INTRAMUSCULAR; INTRAVENOUS at 23:03

## 2018-10-16 RX ADMIN — KETOROLAC TROMETHAMINE 30 MG: 30 INJECTION, SOLUTION INTRAMUSCULAR at 11:23

## 2018-10-16 RX ADMIN — KETOROLAC TROMETHAMINE 30 MG: 30 INJECTION, SOLUTION INTRAMUSCULAR at 20:03

## 2018-10-16 RX ADMIN — ALPRAZOLAM 1 MG: 1 TABLET ORAL at 15:03

## 2018-10-16 RX ADMIN — IPRATROPIUM BROMIDE AND ALBUTEROL SULFATE 3 ML: .5; 3 SOLUTION RESPIRATORY (INHALATION) at 14:16

## 2018-10-16 RX ADMIN — IBUPROFEN 600 MG: 600 TABLET, FILM COATED ORAL at 23:08

## 2018-10-16 RX ADMIN — ALPRAZOLAM 1 MG: 1 TABLET ORAL at 23:00

## 2018-10-16 RX ADMIN — IPRATROPIUM BROMIDE AND ALBUTEROL SULFATE 3 ML: .5; 3 SOLUTION RESPIRATORY (INHALATION) at 22:21

## 2018-10-16 RX ADMIN — METHYLPREDNISOLONE SODIUM SUCCINATE 40 MG: 40 INJECTION, POWDER, FOR SOLUTION INTRAMUSCULAR; INTRAVENOUS at 14:03

## 2018-10-16 RX ADMIN — IOHEXOL 75 ML: 350 INJECTION, SOLUTION INTRAVENOUS at 20:49

## 2018-10-16 RX ADMIN — GABAPENTIN 600 MG: 300 CAPSULE ORAL at 16:55

## 2018-10-16 RX ADMIN — METHYLPREDNISOLONE SODIUM SUCCINATE 125 MG: 125 INJECTION, POWDER, FOR SOLUTION INTRAMUSCULAR; INTRAVENOUS at 08:57

## 2018-10-16 RX ADMIN — IBUPROFEN 600 MG: 600 TABLET, FILM COATED ORAL at 15:53

## 2018-10-16 RX ADMIN — ASPIRIN 325 MG: 325 TABLET, COATED ORAL at 14:43

## 2018-10-16 RX ADMIN — FUROSEMIDE 20 MG: 10 INJECTION, SOLUTION INTRAMUSCULAR; INTRAVENOUS at 15:53

## 2018-10-16 ASSESSMENT — ENCOUNTER SYMPTOMS
FOCAL WEAKNESS: 0
BACK PAIN: 0
COUGH: 1
STRIDOR: 0
DIARRHEA: 0
SHORTNESS OF BREATH: 1
VOMITING: 0
INSOMNIA: 0
WEIGHT LOSS: 0
CHILLS: 0
NAUSEA: 0
SEIZURES: 0
HEARTBURN: 0
BLURRED VISION: 0
NECK PAIN: 0
FEVER: 0
PALPITATIONS: 0
HEADACHES: 0
EYE PAIN: 0
DEPRESSION: 0
ABDOMINAL PAIN: 0
ORTHOPNEA: 0
NERVOUS/ANXIOUS: 0
MYALGIAS: 0
DIZZINESS: 0
EYE REDNESS: 0
SPUTUM PRODUCTION: 0
EYE DISCHARGE: 0

## 2018-10-16 ASSESSMENT — PAIN SCALES - GENERAL
PAINLEVEL_OUTOF10: 9
PAINLEVEL_OUTOF10: 5
PAINLEVEL_OUTOF10: 9
PAINLEVEL_OUTOF10: 9
PAINLEVEL_OUTOF10: 0
PAINLEVEL_OUTOF10: 6
PAINLEVEL_OUTOF10: 9
PAINLEVEL_OUTOF10: 0

## 2018-10-16 ASSESSMENT — PULMONARY FUNCTION TESTS
EPAP_CMH2O: 8

## 2018-10-16 NOTE — ASSESSMENT & PLAN NOTE
This is likely demand ischemia, secondary to hypoxic respiratory failure.  Chest pain-free  Echocardiogram reviewed  Cardiology following, meds reviewed -aspirin/statin, holding beta blocker ACE due to hypotension

## 2018-10-16 NOTE — ED NOTES
"Chief Complaint   Patient presents with   • Shortness of Breath     SOB/non-productive cough/ chills x5 days   • Cough   • Chills     BIB EMS from home for above.   Pt found with O2 SAT of 54% on RA. O2 SAT on arrival in 80's on 15L non-rebreather and 5L NC.   ERP called to bedside. BIPAP initiated.   PIV initiated, blood drawn and sent to lab included one set of cultures.   Sepsis score of 6. 1L NS infusing for BP of 87/51 on arrival.     BP (!) 87/51   Pulse 99   Temp 36.7 °C (98.1 °F)   Resp (!) 22   Ht 1.727 m (5' 8\")   Wt 75.3 kg (166 lb)   LMP 01/17/2015   SpO2 95%   BMI 25.24 kg/m²       "
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
## 2018-10-16 NOTE — PROCEDURES
Central Line Insertion  Date/Time: 10/16/2018 4:56 PM  Performed by: TERRENCE VILLASENOR  Authorized by: TERRENCE VILLASENOR     Consent:     Consent obtained:  Written    Consent given by:  Patient    Risks discussed:  Arterial puncture, bleeding, infection, nerve damage, pneumothorax and incorrect placement    Alternatives discussed:  Delayed treatment and no treatment  Pre-procedure details:     Hand hygiene: Hand hygiene performed prior to insertion      Sterile barrier technique: All elements of maximal sterile technique followed      Skin preparation:  2% chlorhexidine  Sedation:     Sedation type:  None  Anesthesia:     Anesthesia method:  Local infiltration    Local anesthetic:  Lidocaine 1% w/o epi  Procedure details:     Location:  R subclavian    Patient position:  Trendelenburg    Catheter size:  8 Fr    Landmarks identified: yes      Ultrasound guidance: yes      Number of attempts:  1    Successful placement: yes    Post-procedure details:     Post-procedure:  Dressing applied and line sutured    Assessment:  Blood return through all ports and free fluid flow    Patient tolerance of procedure:  Tolerated well, no immediate complications  Comments:      Pending chest xray  Us confirmed placement of guidewire        Chest xray no pneumothorax, adequate placement in svc

## 2018-10-16 NOTE — ASSESSMENT & PLAN NOTE
This is severe sepsis with the following associated acute organ dysfunction(s): acute respiratory failure.   Related to asthma exacerbation/community-acquired pneumonia  Sepsis protocol  IV fluid  Follow culture  Antibiotic with IV levofloxacin(allergic to penicillin and cephalosporin)

## 2018-10-16 NOTE — ASSESSMENT & PLAN NOTE
Suspect severe COPD, asthma exacerbation  Likely hypoxic respiratory failure stressed heart causing congestive heart failure, type II MI  BNP is normal.  Echo with preserved EF 55%  Taper dose of corticosteroids  RT O2 protocol  Scheduled bronchodilators  Hold home inhalers as patient unlikely be able to use at this time

## 2018-10-16 NOTE — ED NOTES
Med rec updated and complete  Allergie reviewed  Pt reports no antibiotics in the last 30 days.  Pt reports no vitamins or OTC's.

## 2018-10-16 NOTE — ED NOTES
Pt resting in bed, repositioned HOB for comfort. Pt states her WOB has improved. She is requesting pain meds, MD notified. IVFs and IV abx infusing.   MD at bedside discussing results thus far and further plan of care.    Daily Assessment

## 2018-10-16 NOTE — ED NOTES
"RT obtaining ABG.  Pt medicated per MAR.   Pt sitting in bed, states pain has improved from toradol. Reports \"a little tiny bit\" of SOB. Pt talking in full sentences, respirations appear even/unlabored and skin warm/dry.   "

## 2018-10-16 NOTE — ED NOTES
Pt tolerating bipap well; she is aaox4 and speaking in full sentences. Currently c/o pleuritic pain to chest & back.

## 2018-10-16 NOTE — CARE PLAN
Problem: Ventilation Defect:  Goal: Ability to achieve and maintain unassisted ventilation or tolerate decreased levels of ventilator support    Intervention: Support and monitor invasive and noninvasive mechanical ventilation  Respiratory Therapy Update     PT on BIPAP to support SP02 and patients WOB.                                     Therapy changed to
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
## 2018-10-16 NOTE — CONSULTS
Reason of Consult: Dyspnea and abnormal troponin    Consulting Physician: Dr. Mares    HPI:  43-year-old female with a history of Graves' disease and asthma/COPD who is an ongoing smoker and is currently managed on prednisone presents with 5 days of progressive dyspnea fevers and chills.  She feels that she was sick enough yesterday to present to the emergency department but held off until today.  Upon arrival she is found to be hypoxic and on evaluation she is currently on BiPAP with normal oxygen saturations.  Troponin was evaluated and found to be abnormal at 2, she mentions chest discomfort worse with deep breathing that is now resolved.  EKG shows sinus tachycardia with RSR prime in V1 but no ischemic changes.  She smokes marijuana but does not use other drugs.  She does not drink.  She has a family history of CAD.  She was febrile in the emergency department with a leukocytosis.    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     Social History Main Topics   • Smoking status: Current Every Day Smoker     Packs/day: 0.50     Years:  25.00   • Smokeless tobacco: Never Used   • Alcohol use No      Comment: denies h/o heavy use   • Drug use: No      Comment: denies h/o heroin, cocaine, heroin, IVDU   • Sexual activity: Yes      Comment: engaged     Other Topics Concern   • Not on file     Social History Narrative   • No narrative on file       No current facility-administered medications on file prior to encounter.      Current Outpatient Prescriptions on File Prior to Encounter   Medication Sig Dispense Refill   • VENTOLIN  (90 BASE) MCG/ACT Aero Soln inhalation aerosol INHALE TWO PUFFS BY MOUTH EVERY 6 HOURS AS NEEDED FOR SHORTNESS OF BREATH 1 Inhaler 0   • gabapentin (NEURONTIN) 600 MG tablet Take 1 Tab by mouth 3 times a day. 90 Tab 11   • ibuprofen (MOTRIN) 800 MG Tab Take 800 mg by mouth every day.     • alprazolam (XANAX) 1 MG TABS Take 1 mg by mouth 3 times a day as needed. Indications: Feeling Anxious, Trouble Sleeping         Current Facility-Administered Medications   Medication Dose Frequency Provider Last Rate Last Dose   • albuterol (PROVENTIL) 2.5mg/0.5ml nebulizer solution 2.5 mg  2.5 mg Once Rinku Zee M.D.   Stopped at 10/16/18 0915   • ipratropium (ATROVENT) 0.02 % nebulizer solution 0.5 mg  0.5 mg Once Rinku Zee M.D.   Stopped at 10/16/18 0915   • ALPRAZolam (XANAX) tablet 1 mg  1 mg TID PRN Yunior Villalobos M.D.       • gabapentin (NEURONTIN) capsule 600 mg  600 mg TID Yunior Villalobos M.D.   600 mg at 10/16/18 1156   • [START ON 10/17/2018] levothyroxine (SYNTHROID) tablet 62.5 mcg  62.5 mcg DAILY Yunior Villalobos M.D.       • traZODone (DESYREL) tablet 100 mg  100 mg HS PRN Yunior Villalobos M.D.       • albuterol inhaler 2 Puff  2 Puff Q4H PRN (RT) Yunior Villalobos M.D.       • Respiratory Care per Protocol   Continuous RT Yunior Villalobos M.D.       • senna-docusate (PERICOLACE or SENOKOT S) 8.6-50 MG per tablet 2 Tab  2 Tab BID Yunior Villalobos M.D.        And   • polyethylene glycol/lytes (MIRALAX) PACKET 1 Packet  1 Packet QDAY PRN  "Yunior Villalobos M.D.        And   • magnesium hydroxide (MILK OF MAGNESIA) suspension 30 mL  30 mL QDAY PRN Yunior Villalobos M.D.        And   • bisacodyl (DULCOLAX) suppository 10 mg  10 mg QDAY PRN Yunior Villalobos M.D.       • acetaminophen (TYLENOL) tablet 650 mg  650 mg Q6HRS PRN Yunior Villalobos M.D.       • [START ON 10/17/2018] levoFLOXacin in D5W (LEVAQUIN) IVPB 750 mg  750 mg Q24HRS Yunior Villalobos M.D.       • ondansetron (ZOFRAN) syringe/vial injection 4 mg  4 mg Q4HRS PRN Yunior Villalobos M.D.       • ondansetron (ZOFRAN ODT) dispertab 4 mg  4 mg Q4HRS PRN Yunior Villalobos M.D.       • promethazine (PHENERGAN) tablet 12.5-25 mg  12.5-25 mg Q4HRS PRN Yunior Villalobos M.D.       • promethazine (PHENERGAN) suppository 12.5-25 mg  12.5-25 mg Q4HRS PRN Yunior Villalobos M.D.       • prochlorperazine (COMPAZINE) injection 5-10 mg  5-10 mg Q4HRS PRN Yunior Villalobos M.D.       • methylPREDNISolone (SOLU-MEDROL) 40 MG injection 40 mg  40 mg Q6HRS Yunior Villalobos M.D.       • ipratropium-albuterol (DUONEB) nebulizer solution  3 mL Q4HRS (RT) Yunior Villalobos M.D.       • MD Alert...ICU Electrolyte Replacement per Pharmacy   pharmacy to dose Kei Curiel M.D.       • [START ON 10/17/2018] enoxaparin (LOVENOX) inj 40 mg  40 mg DAILY Kei Curiel M.D.         Last reviewed on 10/16/2018 10:19 AM by Makenzie Carmona    Allergies: Penicillins; Sulfa drugs; Dilantin [phenytoin sodium]; Flexeril [cyclobenzaprine hcl]; Keflex; Methadone; Mushroom extract complex; Rondec; Tegretol [carbamazepine]; and Ultracef [cefadroxil monohydrate]    Family History   Problem Relation Age of Onset   • Psychiatry Mother    • Alcohol/Drug Mother    • Hypertension Mother    • Cancer Father        ROS: As per HPI all other systems reviewed and negative     Physical Exam   Blood pressure (!) 87/51, pulse 79, temperature 37.2 °C (99 °F), resp. rate (!) 22, height 1.727 m (5' 8\"), weight 75.3 kg (166 lb), last menstrual period 01/17/2015, SpO2 99 %.    Constitutional: On BiPAP.  Able to " speak in short sentences.  Appears well-developed.   HENT: Normocephalic and atraumatic. No scleral icterus.   Neck: Unable to assess JVD due to body habitus  Cardiovascular: Normal rate. Exam reveals no gallop and no friction rub. No murmur heard.   Pulmonary/Chest: Coarse rhonchi bilateral anterior and posterior  Abdominal: S/NT/ND BS+   Musculoskeletal:  Pulses present. No atrophy. Strength normal.  Extremities: Exhibits no edema. No clubbing or cyanosis.   Skin: Skin is warm and moist.   Neuro: Non-focal, CN 2-12 intact grossly    No intake or output data in the 24 hours ending 10/16/18 1238    Recent Labs      10/16/18   0830   WBC  15.6*   RBC  5.26   HEMOGLOBIN  11.2*   HEMATOCRIT  34.7*   MCV  66.0*   MCH  21.3*   MCHC  32.3*   RDW  38.8   PLATELETCT  150*   MPV  9.8     Recent Labs      10/16/18   0830   SODIUM  139   POTASSIUM  3.6   CHLORIDE  107   CO2  22   GLUCOSE  121*   BUN  18   CREATININE  0.64   CALCIUM  8.8         Recent Labs      10/16/18   0830   BNPBTYPENAT  96     Recent Labs      10/16/18   0830   TROPONINI  2.78*   BNPBTYPENAT  96           EKG (10/16/2018 ):  I have personally reviewed the EKG this visit and discussed with the patient. It shows sinus tachycardia with an RSR prime in V1.  No ischemic changes.    Review of records indicates she had a normal cardiac evaluation 2 years ago per her report.    Imaging reviewed    Impressions:  1.  Hypoxemic respiratory failure  2.  Sepsis most likely related to multifocal pneumonia  3.  Non-ST elevation microinfarction, type II demand  4.  Immune compromised host on chronic steroid therapy  5.  Graves' disease  6.  Tobacco abuse    Recommendations:  Supportive care for her respiratory status as ongoing and early antibiotic therapy for her sepsis.  Recommend an urgent echocardiogram to evaluate her LV function in the context of her elevated troponin which either represents a demand ischemia or could represent if her ejection fraction is not  normal a focal myocarditis related to her infection.  The meantime no beta-blockers or ACE inhibitors due to hypotension and sepsis, and aspirin would be reasonable as would a statin.    Further recommendations pending initial evaluation.  Would recommend also trending troponins.    Thank you for this interesting consultation. It was my pleasure to see Madelyn Robb today.    Geoffrey Booker MD, FACC, [facility]  Division of Interventional Cardiology  [facility]      Discussed with the referring physician and bedside nursing.

## 2018-10-16 NOTE — ED NOTES
"Assisted pt to bedside commode; pt appears stable on her feet and she moves positions easily. Urine is dark; pt states she has not consumed much water in the past few days because she \"couldnt stomach it.\" Pt is becoming angry and anxious that she is not receiving pain meds, will ask MD again.   "

## 2018-10-16 NOTE — CONSULTS
Critical Care Consultation    Date of consult: 10/16/2018    Referring Physician  Rinku Zee M.D.    Reason for Consultation  Lactic acidosis, sepsis and troponin elevation, respiratory failure requiring bipap    History of Presenting Illness  43 y.o. female who presented 10/16/2018 past medical history of COPD and asthma.  She also has a history of  hypothyroid with Graves' disease.  She was following with a local endocrinologist, however they would not prescribe her steroids, and she saw the endocrinologist in [place], started on steroids 1 month ago, 40 mg daily.  She is not on any Bactrim prophylactic dosing.  She does not have a cough.  She feels that her chest is tight, she has had difficulty breathing for 3 days, she thought it was an asthma exacerbation and took her inhalers, she is supposed to be on Brio Ellipta and Spiriva at home, she still smokes cigarettes, she did not get better and today felt like she could not breathe and her  brought her to the hospital.  She denies any history of cardiac disease.  Her chest x-ray shows diffuse infiltrates, suspicious for either volume overload or potentially Arntson and an atypical infection.  She has not had any sick contacts recently.  She does not currently have chest pain.  She continues to wheeze.  In the ER she was hypoxic on nasal cannula and oxygen mask, transitioned to BiPAP and remains saturating 90-91% on 80% FiO2, BiPAP settings 12/8.  She currently feels better.  She denies any abdominal pain, dysuria, polyuria, headaches, acute motor or sensory changes, or lower extremity swelling.  She had a blood clot when she was younger secondary to a leg injury.  She is not currently on any anticoagulation.  Initially she was hypotensive in the emergency room down to 70 systolic, and received IV fluid resuscitation.  Other medical past medical history includes anxiety, depression, asthma, beta thalassemia, chronic pain, and has a history of  opiate use and seizures.  She says she is not currently on any narcotics and she does not use IV or recreational drugs other than occasional marijuana.  She does not drink alcohol.    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    Surgical History   has a past surgical history that includes open reduction; appendectomy; abdominal exploration; cholecystectomy; and tubal coagulation laparoscopic bilateral.    Family History  family history includes Alcohol/Drug in her mother; Cancer in her father; Hypertension in her mother; Psychiatry in her mother.    Social History   reports that she has been smoking.  She has a 12.50 pack-year smoking history. She has never used smokeless tobacco. She reports that she does not drink alcohol or use drugs.    Medications  Home Medications     Reviewed by Makenzie Carmona (Pharmacy Tech) on 10/16/18 at 1019  Med List Status: Complete   Medication Last Dose Status   alprazolam (XANAX) 1 MG TABS > 5 days Active   Fluticasone Furoate-Vilanterol (BREO ELLIPTA) 100-25 MCG/INH AEROSOL POWDER, BREATH ACTIVATED 10/15/2018 Active   gabapentin (NEURONTIN) 600 MG tablet 10/16/2018 Active   ibuprofen (MOTRIN) 800 MG Tab 10/16/2018 Active   levothyroxine (SYNTHROID) 125 MCG Tab 10/16/2018 Active   omeprazole (PRILOSEC) 20 MG  delayed-release capsule > 2 weeks Active   traZODone (DESYREL) 100 MG Tab 10/12/2018 Active   VENTOLIN  (90 BASE) MCG/ACT Aero Soln inhalation aerosol > 1 week Active              Current Facility-Administered Medications   Medication Dose Route Frequency Provider Last Rate Last Dose   • albuterol (PROVENTIL) 2.5mg/0.5ml nebulizer solution 2.5 mg  2.5 mg Nebulization Once Rinku Zee M.D.   Stopped at 10/16/18 0915   • ipratropium (ATROVENT) 0.02 % nebulizer solution 0.5 mg  0.5 mg Nebulization Once Rinku Zee M.D.   Stopped at 10/16/18 0915   • levoFLOXacin in D5W (LEVAQUIN) IVPB 750 mg  750 mg Intravenous Once Rinku Zee M.D. 100 mL/hr at 10/16/18 0949 750 mg at 10/16/18 0949   • ALPRAZolam (XANAX) tablet 1 mg  1 mg Oral TID PRN Yunior Villalobos M.D.       • Fluticasone Furoate-Vilanterol (BREO) 100-25 MCG/INH 1 Puff  1 Puff Inhalation DAILY Yunior Villalobos M.D.   Stopped at 10/16/18 1100   • gabapentin (NEURONTIN) tablet 600 mg  600 mg Oral TID Yunior Villalobos M.D.       • levothyroxine (SYNTHROID) tablet 62.5 mcg  62.5 mcg Oral AM ES Yunior Villalobos M.D.       • traZODone (DESYREL) tablet 100 mg  100 mg Oral HS PRN Yunior Villalobos M.D.       • albuterol inhaler 2 Puff  2 Puff Inhalation Q4H PRN (RT) Yunior Villalobos M.D.       • Respiratory Care per Protocol   Nebulization Continuous RT Yunior Villalobos M.D.       • NS infusion 2,259 mL  30 mL/kg Intravenous Once PRN Yunior Villalobos M.D.       • senna-docusate (PERICOLACE or SENOKOT S) 8.6-50 MG per tablet 2 Tab  2 Tab Oral BID Yunior Villalobos M.D.        And   • polyethylene glycol/lytes (MIRALAX) PACKET 1 Packet  1 Packet Oral QDAY PRN Yunior Villalobos M.D.        And   • magnesium hydroxide (MILK OF MAGNESIA) suspension 30 mL  30 mL Oral QDAY PRN Yunior Villalobos M.D.        And   • bisacodyl (DULCOLAX) suppository 10 mg  10 mg Rectal QDAY PRN Yunior Villalobos M.D.       • Respiratory Care per Protocol   Nebulization Continuous RT Yunior Villalobos M.D.       • Respiratory Care per  Protocol   Nebulization Continuous RT Yunior Villalobos M.D.       • NS infusion   Intravenous Continuous Yunior Villalobos M.D.       • NS (BOLUS) infusion 500 mL  500 mL Intravenous Once PRN Yunior Villalobos M.D.       • enoxaparin (LOVENOX) inj 40 mg  40 mg Subcutaneous DAILY Yunior Villalobos M.D.       • acetaminophen (TYLENOL) tablet 650 mg  650 mg Oral Q6HRS PRN Yunior Villalobos M.D.       • levoFLOXacin in D5W (LEVAQUIN) IVPB 750 mg  750 mg Intravenous Q24HRS Yunior Villalobos M.D.       • ondansetron (ZOFRAN) syringe/vial injection 4 mg  4 mg Intravenous Q4HRS PRN Yunior Villalobos M.D.       • ondansetron (ZOFRAN ODT) dispertab 4 mg  4 mg Oral Q4HRS PRN Yunior Villalobos M.D.       • promethazine (PHENERGAN) tablet 12.5-25 mg  12.5-25 mg Oral Q4HRS PRN Yunior Villalobos M.D.       • promethazine (PHENERGAN) suppository 12.5-25 mg  12.5-25 mg Rectal Q4HRS PRN Yunior Villalobos M.D.       • prochlorperazine (COMPAZINE) injection 5-10 mg  5-10 mg Intravenous Q4HRS PRN Yunior Villalobos M.D.       • methylPREDNISolone (SOLU-MEDROL) 40 MG injection 40 mg  40 mg Intravenous Q6HRS Yunior Villalobos M.D.       • ipratropium-albuterol (DUONEB) nebulizer solution  3 mL Nebulization Q4HRS (RT) Yunior Villalobos M.D.       • tiotropium (SPIRIVA) 18 MCG inhalation capsule 1 Cap  1 Cap Inhalation QDAILY (RT) Yunior Villalobos M.D.       • budesonide-formoterol (SYMBICORT) 160-4.5 MCG/ACT inhaler 2 Puff  2 Puff Inhalation BID (RT) Yunior Villalobos M.D.   Stopped at 10/16/18 1100     Current Outpatient Prescriptions   Medication Sig Dispense Refill   • Fluticasone Furoate-Vilanterol (BREO ELLIPTA) 100-25 MCG/INH AEROSOL POWDER, BREATH ACTIVATED Inhale 1 Puff by mouth every day.     • levothyroxine (SYNTHROID) 125 MCG Tab Take 62.5 mcg by mouth Every morning on an empty stomach.     • omeprazole (PRILOSEC) 20 MG delayed-release capsule Take 20 mg by mouth as needed.     • traZODone (DESYREL) 100 MG Tab Take 100 mg by mouth at bedtime as needed for Sleep.     • VENTOLIN  (90 BASE) MCG/ACT Aero Soln  inhalation aerosol INHALE TWO PUFFS BY MOUTH EVERY 6 HOURS AS NEEDED FOR SHORTNESS OF BREATH 1 Inhaler 0   • gabapentin (NEURONTIN) 600 MG tablet Take 1 Tab by mouth 3 times a day. 90 Tab 11   • ibuprofen (MOTRIN) 800 MG Tab Take 800 mg by mouth every day.     • alprazolam (XANAX) 1 MG TABS Take 1 mg by mouth 3 times a day as needed. Indications: Feeling Anxious, Trouble Sleeping         Allergies  Allergies   Allergen Reactions   • Penicillins Anaphylaxis     Reported that her throat closes   • Sulfa Drugs Anaphylaxis     Reported that her throat closes   • Dilantin [Phenytoin Sodium] Swelling   • Flexeril [Cyclobenzaprine Hcl] Swelling   • Keflex Rash and Swelling     Full body swelling and rash   • Methadone Swelling   • Mushroom Extract Complex Swelling   • Rondec Rash and Swelling     .   • Tegretol [Carbamazepine] Rash and Swelling     .   • Ultracef [Cefadroxil Monohydrate]      Pt is not sure, but knows that she has an allergie to this medications.         Vital Signs last 24 hours  Temp:  [36.7 °C (98.1 °F)-37.2 °C (99 °F)] 37.2 °C (99 °F)  Pulse:  [] 99  Resp:  [20-22] 20  BP: (87)/(51) 87/51    Physical Exam  Physical Exam   Constitutional: She appears well-developed. She appears distressed.   HENT:   Head: Normocephalic and atraumatic.   Right Ear: External ear normal.   Left Ear: External ear normal.   Mouth/Throat: No oropharyngeal exudate.   Eyes: Pupils are equal, round, and reactive to light. Conjunctivae and EOM are normal. Right eye exhibits no discharge. Left eye exhibits no discharge.   Neck: No JVD present. No tracheal deviation present.   Cardiovascular: Normal rate, regular rhythm and normal heart sounds.    No murmur heard.  Per bedside ultrasound non dilated right heart.  Reduced left heart function.  No tamponade noted.  Unable to visualize any vegetations   Pulmonary/Chest: No stridor. She is in respiratory distress. She has wheezes. She has rales.   Per bedside ultrasound  hypervolemic with b lines.  IVC dilated   Abdominal: She exhibits no distension. There is no tenderness. There is no rebound and no guarding.   Musculoskeletal: She exhibits no edema, tenderness or deformity.   Neurological: She displays normal reflexes. No cranial nerve deficit. Coordination normal.   Skin: No rash noted. She is not diaphoretic. No erythema. No pallor.   Psychiatric: She has a normal mood and affect. Her behavior is normal.       Fluids  No intake or output data in the 24 hours ending 10/16/18 1102    Laboratory  Recent Results (from the past 48 hour(s))   CBC WITH DIFFERENTIAL    Collection Time: 10/16/18  8:30 AM   Result Value Ref Range    WBC 15.6 (H) 4.8 - 10.8 K/uL    RBC 5.26 4.20 - 5.40 M/uL    Hemoglobin 11.2 (L) 12.0 - 16.0 g/dL    Hematocrit 34.7 (L) 37.0 - 47.0 %    MCV 66.0 (L) 81.4 - 97.8 fL    MCH 21.3 (L) 27.0 - 33.0 pg    MCHC 32.3 (L) 33.6 - 35.0 g/dL    RDW 38.8 35.9 - 50.0 fL    Platelet Count 150 (L) 164 - 446 K/uL    MPV 9.8 9.0 - 12.9 fL    Neutrophils-Polys 90.10 (H) 44.00 - 72.00 %    Lymphocytes 7.30 (L) 22.00 - 41.00 %    Monocytes 1.40 0.00 - 13.40 %    Eosinophils 0.50 0.00 - 6.90 %    Basophils 0.10 0.00 - 1.80 %    Immature Granulocytes 0.60 0.00 - 0.90 %    Nucleated RBC 0.00 /100 WBC    Neutrophils (Absolute) 14.02 (H) 2.00 - 7.15 K/uL    Lymphs (Absolute) 1.13 1.00 - 4.80 K/uL    Monos (Absolute) 0.22 0.00 - 0.85 K/uL    Eos (Absolute) 0.08 0.00 - 0.51 K/uL    Baso (Absolute) 0.02 0.00 - 0.12 K/uL    Immature Granulocytes (abs) 0.09 0.00 - 0.11 K/uL    NRBC (Absolute) 0.00 K/uL   COMP METABOLIC PANEL    Collection Time: 10/16/18  8:30 AM   Result Value Ref Range    Sodium 139 135 - 145 mmol/L    Potassium 3.6 3.6 - 5.5 mmol/L    Chloride 107 96 - 112 mmol/L    Co2 22 20 - 33 mmol/L    Anion Gap 10.0 0.0 - 11.9    Glucose 121 (H) 65 - 99 mg/dL    Bun 18 8 - 22 mg/dL    Creatinine 0.64 0.50 - 1.40 mg/dL    Calcium 8.8 8.5 - 10.5 mg/dL    AST(SGOT) 29 12 - 45 U/L     ALT(SGPT) 8 2 - 50 U/L    Alkaline Phosphatase 72 30 - 99 U/L    Total Bilirubin 1.5 0.1 - 1.5 mg/dL    Albumin 3.8 3.2 - 4.9 g/dL    Total Protein 6.3 6.0 - 8.2 g/dL    Globulin 2.5 1.9 - 3.5 g/dL    A-G Ratio 1.5 g/dL   LIPASE    Collection Time: 10/16/18  8:30 AM   Result Value Ref Range    Lipase 3 (L) 11 - 82 U/L   TROPONIN    Collection Time: 10/16/18  8:30 AM   Result Value Ref Range    Troponin I 2.78 (H) 0.00 - 0.04 ng/mL   BTYPE NATRIURETIC PEPTIDE    Collection Time: 10/16/18  8:30 AM   Result Value Ref Range    B Natriuretic Peptide 96 0 - 100 pg/mL   LACTIC ACID    Collection Time: 10/16/18  8:30 AM   Result Value Ref Range    Lactic Acid 1.7 0.5 - 2.0 mmol/L   TSH    Collection Time: 10/16/18  8:30 AM   Result Value Ref Range    TSH 0.130 (L) 0.380 - 5.330 uIU/mL   FREE THYROXINE    Collection Time: 10/16/18  8:30 AM   Result Value Ref Range    Free T-4 1.06 0.53 - 1.43 ng/dL   ESTIMATED GFR    Collection Time: 10/16/18  8:30 AM   Result Value Ref Range    GFR If African American >60 >60 mL/min/1.73 m 2    GFR If Non African American >60 >60 mL/min/1.73 m 2   EKG (NOW)    Collection Time: 10/16/18  8:57 AM   Result Value Ref Range    Report       [facility] Emergency Dept.    Test Date:  2018-10-16  Pt Name:    DENA CASILLAS              Department: ER  MRN:        [id number]                      Room:        11  Gender:     Female                       Technician: [id number]  :        [id number]                   Requested By:LAURITA PINK  Order #:    [id number]                    Reading MD: LAURITA PINK MD    Measurements  Intervals                                Axis  Rate:       103                          P:          46  ID:         [id number]                          QRS:        70  QRSD:       94                           T:          31  QT:         360  QTc:        471    Interpretive Statements  SINUS TACHYCARDIA  PROBABLE LEFT ATRIAL ABNORMALITY  RSR' IN V1 OR V2,  RIGHT VCD OR RVH  Compared to ECG 01/31/2018 15:12:20  Right ventricular hypertrophy now present    Electronically Signed On 10- 9:08:24 PDT by LAURITA PINK MD         Imaging  DX-CHEST-PORTABLE (1 VIEW)   Final Result      Bilateral airspace opacities may represent pulmonary edema, multifocal pneumonia, ARDS.         DX-CHEST-2 VIEWS    (Results Pending)   CT-CTA CHEST PULMONARY ARTERY W/ RECONS    (Results Pending)   EC-ECHOCARDIOGRAM COMPLETE W/O CONT    (Results Pending)       Assessment/Plan  Severe sepsis (HCC)- (present on admission)   Assessment & Plan    Patient meets septic criteria.    Likely pulmonary infection.    She is severely hypoxic.    She has been on 1 month of steroids at high doses 40 mg daily.    She is not a prophylaxis for pcp.   Does not create sputum.  If intubated then bronchoscopy with BAL recommended.  On Levaquin.  Depending on response to Lasix, may consider Bactrim.  Viral panel.          Shock (HCC)   Assessment & Plan    This is likely septic shock.  There may be a component of cardiogenic shock.  Not currently on vasopressors.  Blood pressure is in the 90s systolic, which is normal for patient.  If hypotension recurs, will need central line.  Map target greater than 65.  Hypervolemic on exam, patient unlikely to tolerate IV fluids.        Elevated troponin- (present on admission)   Assessment & Plan    This is likely demand ischemia, secondary to hypoxic respiratory failure.  Patient does not have any chest pain.  Echocardiogram pending  Cardiology consulted          Leukocytosis- (present on admission)   Assessment & Plan    Patient has sepsis, likely pneumonia.  Levaquin  See above for sepsis        Acute respiratory failure with hypoxia (HCC)- (present on admission)   Assessment & Plan    Per patient's history likely severe COPD, asthma exacerbation  Likely hypoxic respiratory failure stressed heart causing congestive heart failure.  Her BNP is normal.  Troponin  elevation.  Echo, ordered, cardiology has been consulted per the ER.  Per bedside ultrasound seems hypervolemic, this may also represent arts or other atypical infection but less likely  Trial of Lasix 20 mg once, if blood pressure tolerates then repeat.  Currently on BiPAP 12/8, requiring FiO2 of 80%.  High likelihood of patient being placed on mechanical ventilation.  Will attempt diuresis, if continue to have FiO2 high requirements then intubate.  40 mg Solu-Medrol 4 times daily.  RT O2 protocol  Duo nebs every 4  Hold home inhalers as patient unlikely be able to use at this time  Pro-calcitonin pending  Patient will need a CT chest, quick ultrasound bedside did not show severe right heart dilation, however due to hypoxia CTA pe study  Pending formal echo          dvt px lovenox  Gi px, not indicated  leticia Greene    Discussed patient condition and risk of morbidity and/or mortality with Hospitalist, RN, RT, Pharmacy, Patient and hospitalist.    The patient remains critically ill.  Critical care time = 55 minutes in directly providing and coordinating critical care and extensive data review.  No time overlap and excludes procedures.

## 2018-10-16 NOTE — ASSESSMENT & PLAN NOTE
With chest pain  Likely related to demand ischemia?  Cardiology consulted  Check echo and trend trop

## 2018-10-16 NOTE — ASSESSMENT & PLAN NOTE
Significantly hypoxemic on presentation, suspect pulmonary source  She has been on 1 month of steroids at high doses 40 mg daily.    She is not on prophylaxis for pcp, LDH elevated, sputum for PCP pending  Complete course of levofloxacin  Course of diuresis  PCT low for what it is worth

## 2018-10-16 NOTE — PROGRESS NOTES
"I examined the patient 10/16/2018 10:43 AM  Vital Signs:BP (!) 87/51   Pulse 99   Temp 37.2 °C (99 °F)   Resp 20   Ht 1.727 m (5' 8\")   Wt 75.3 kg (166 lb)   LMP 01/17/2015   SpO2 95%   BMI 25.24 kg/m²   Cardiac examination significant for Tachycardia  Pulmonary examination significant for Wheezing  Capillary refill is brisk  Peripheral Pulse is 2+   Skin is normal  "

## 2018-10-17 ENCOUNTER — APPOINTMENT (OUTPATIENT)
Dept: RADIOLOGY | Facility: MEDICAL CENTER | Age: 43
DRG: 871 | End: 2018-10-17
Attending: INTERNAL MEDICINE
Payer: MEDICAID

## 2018-10-17 PROBLEM — E05.00 GRAVES DISEASE: Status: ACTIVE | Noted: 2018-10-17

## 2018-10-17 PROBLEM — Z72.0 TOBACCO ABUSE: Status: ACTIVE | Noted: 2018-10-17

## 2018-10-17 PROBLEM — D64.9 ANEMIA: Status: ACTIVE | Noted: 2018-10-17

## 2018-10-17 LAB
ANION GAP SERPL CALC-SCNC: 8 MMOL/L (ref 0–11.9)
BASOPHILS # BLD AUTO: 0 % (ref 0–1.8)
BASOPHILS # BLD: 0 K/UL (ref 0–0.12)
BUN SERPL-MCNC: 13 MG/DL (ref 8–22)
CALCIUM SERPL-MCNC: 8.5 MG/DL (ref 8.5–10.5)
CHLORIDE SERPL-SCNC: 105 MMOL/L (ref 96–112)
CO2 SERPL-SCNC: 27 MMOL/L (ref 20–33)
CREAT SERPL-MCNC: 0.58 MG/DL (ref 0.5–1.4)
EOSINOPHIL # BLD AUTO: 0 K/UL (ref 0–0.51)
EOSINOPHIL NFR BLD: 0 % (ref 0–6.9)
ERYTHROCYTE [DISTWIDTH] IN BLOOD BY AUTOMATED COUNT: 39.4 FL (ref 35.9–50)
FERRITIN SERPL-MCNC: 150.3 NG/ML (ref 10–291)
GLUCOSE SERPL-MCNC: 166 MG/DL (ref 65–99)
HCT VFR BLD AUTO: 26.7 % (ref 37–47)
HGB BLD-MCNC: 8.4 G/DL (ref 12–16)
IMM GRANULOCYTES # BLD AUTO: 0.04 K/UL (ref 0–0.11)
IMM GRANULOCYTES NFR BLD AUTO: 0.4 % (ref 0–0.9)
IRON SATN MFR SERPL: 15 % (ref 15–55)
IRON SERPL-MCNC: 45 UG/DL (ref 40–170)
LYMPHOCYTES # BLD AUTO: 0.48 K/UL (ref 1–4.8)
LYMPHOCYTES NFR BLD: 5.4 % (ref 22–41)
MAGNESIUM SERPL-MCNC: 2 MG/DL (ref 1.5–2.5)
MCH RBC QN AUTO: 20.8 PG (ref 27–33)
MCHC RBC AUTO-ENTMCNC: 31.8 G/DL (ref 33.6–35)
MCV RBC AUTO: 65.3 FL (ref 81.4–97.8)
MONOCYTES # BLD AUTO: 0.13 K/UL (ref 0–0.85)
MONOCYTES NFR BLD AUTO: 1.5 % (ref 0–13.4)
NEUTROPHILS # BLD AUTO: 8.25 K/UL (ref 2–7.15)
NEUTROPHILS NFR BLD: 92.7 % (ref 44–72)
NRBC # BLD AUTO: 0 K/UL
NRBC BLD-RTO: 0 /100 WBC
PHOSPHATE SERPL-MCNC: 3.1 MG/DL (ref 2.5–4.5)
PLATELET # BLD AUTO: 116 K/UL (ref 164–446)
PMV BLD AUTO: 9.7 FL (ref 9–12.9)
POTASSIUM SERPL-SCNC: 3.5 MMOL/L (ref 3.6–5.5)
PROCALCITONIN SERPL-MCNC: 0.09 NG/ML
RBC # BLD AUTO: 4.04 M/UL (ref 4.2–5.4)
SODIUM SERPL-SCNC: 140 MMOL/L (ref 135–145)
TIBC SERPL-MCNC: 300 UG/DL (ref 250–450)
WBC # BLD AUTO: 8.9 K/UL (ref 4.8–10.8)

## 2018-10-17 PROCEDURE — 770022 HCHG ROOM/CARE - ICU (200)

## 2018-10-17 PROCEDURE — 99233 SBSQ HOSP IP/OBS HIGH 50: CPT | Performed by: INTERNAL MEDICINE

## 2018-10-17 PROCEDURE — 94640 AIRWAY INHALATION TREATMENT: CPT

## 2018-10-17 PROCEDURE — 700111 HCHG RX REV CODE 636 W/ 250 OVERRIDE (IP): Performed by: INTERNAL MEDICINE

## 2018-10-17 PROCEDURE — 700102 HCHG RX REV CODE 250 W/ 637 OVERRIDE(OP): Performed by: INTERNAL MEDICINE

## 2018-10-17 PROCEDURE — 84100 ASSAY OF PHOSPHORUS: CPT

## 2018-10-17 PROCEDURE — 85025 COMPLETE CBC W/AUTO DIFF WBC: CPT

## 2018-10-17 PROCEDURE — 99291 CRITICAL CARE FIRST HOUR: CPT | Performed by: INTERNAL MEDICINE

## 2018-10-17 PROCEDURE — 84145 PROCALCITONIN (PCT): CPT

## 2018-10-17 PROCEDURE — 83550 IRON BINDING TEST: CPT

## 2018-10-17 PROCEDURE — 700111 HCHG RX REV CODE 636 W/ 250 OVERRIDE (IP): Performed by: HOSPITALIST

## 2018-10-17 PROCEDURE — 83735 ASSAY OF MAGNESIUM: CPT

## 2018-10-17 PROCEDURE — A9270 NON-COVERED ITEM OR SERVICE: HCPCS | Performed by: INTERNAL MEDICINE

## 2018-10-17 PROCEDURE — 83540 ASSAY OF IRON: CPT

## 2018-10-17 PROCEDURE — 71046 X-RAY EXAM CHEST 2 VIEWS: CPT

## 2018-10-17 PROCEDURE — 700101 HCHG RX REV CODE 250: Performed by: INTERNAL MEDICINE

## 2018-10-17 PROCEDURE — 80048 BASIC METABOLIC PNL TOTAL CA: CPT

## 2018-10-17 PROCEDURE — A9270 NON-COVERED ITEM OR SERVICE: HCPCS | Performed by: HOSPITALIST

## 2018-10-17 PROCEDURE — 700102 HCHG RX REV CODE 250 W/ 637 OVERRIDE(OP): Performed by: HOSPITALIST

## 2018-10-17 PROCEDURE — 99233 SBSQ HOSP IP/OBS HIGH 50: CPT | Performed by: HOSPITALIST

## 2018-10-17 PROCEDURE — 82728 ASSAY OF FERRITIN: CPT

## 2018-10-17 PROCEDURE — 82270 OCCULT BLOOD FECES: CPT

## 2018-10-17 PROCEDURE — 94003 VENT MGMT INPAT SUBQ DAY: CPT

## 2018-10-17 RX ORDER — OMEPRAZOLE 20 MG/1
20 CAPSULE, DELAYED RELEASE ORAL 2 TIMES DAILY
Status: DISCONTINUED | OUTPATIENT
Start: 2018-10-17 | End: 2018-10-21 | Stop reason: HOSPADM

## 2018-10-17 RX ORDER — POTASSIUM CHLORIDE 20 MEQ/1
60 TABLET, EXTENDED RELEASE ORAL ONCE
Status: COMPLETED | OUTPATIENT
Start: 2018-10-17 | End: 2018-10-17

## 2018-10-17 RX ORDER — PREDNISONE 20 MG/1
60 TABLET ORAL DAILY
Status: DISCONTINUED | OUTPATIENT
Start: 2018-10-17 | End: 2018-10-21 | Stop reason: HOSPADM

## 2018-10-17 RX ORDER — OXYCODONE HYDROCHLORIDE 5 MG/1
5 TABLET ORAL EVERY 4 HOURS PRN
Status: DISCONTINUED | OUTPATIENT
Start: 2018-10-17 | End: 2018-10-21 | Stop reason: HOSPADM

## 2018-10-17 RX ORDER — NICOTINE 21 MG/24HR
21 PATCH, TRANSDERMAL 24 HOURS TRANSDERMAL
Status: DISCONTINUED | OUTPATIENT
Start: 2018-10-17 | End: 2018-10-21 | Stop reason: HOSPADM

## 2018-10-17 RX ORDER — FAMOTIDINE 20 MG/1
20 TABLET, FILM COATED ORAL 2 TIMES DAILY
Status: DISCONTINUED | OUTPATIENT
Start: 2018-10-17 | End: 2018-10-17

## 2018-10-17 RX ORDER — CLOTRIMAZOLE 10 MG/1
10 LOZENGE ORAL; TOPICAL
Status: DISCONTINUED | OUTPATIENT
Start: 2018-10-17 | End: 2018-10-21 | Stop reason: HOSPADM

## 2018-10-17 RX ORDER — GUAIFENESIN 600 MG/1
600 TABLET, EXTENDED RELEASE ORAL EVERY 12 HOURS
Status: DISCONTINUED | OUTPATIENT
Start: 2018-10-17 | End: 2018-10-21 | Stop reason: HOSPADM

## 2018-10-17 RX ADMIN — IPRATROPIUM BROMIDE AND ALBUTEROL SULFATE 3 ML: .5; 3 SOLUTION RESPIRATORY (INHALATION) at 15:37

## 2018-10-17 RX ADMIN — CLOTRIMAZOLE 10 MG: 10 LOZENGE ORAL; TOPICAL at 23:11

## 2018-10-17 RX ADMIN — OMEPRAZOLE 20 MG: 20 CAPSULE, DELAYED RELEASE ORAL at 09:42

## 2018-10-17 RX ADMIN — IPRATROPIUM BROMIDE AND ALBUTEROL SULFATE 3 ML: .5; 3 SOLUTION RESPIRATORY (INHALATION) at 18:50

## 2018-10-17 RX ADMIN — CLOTRIMAZOLE 10 MG: 10 LOZENGE ORAL; TOPICAL at 16:17

## 2018-10-17 RX ADMIN — IBUPROFEN 600 MG: 600 TABLET, FILM COATED ORAL at 06:35

## 2018-10-17 RX ADMIN — CLOTRIMAZOLE 10 MG: 10 LOZENGE ORAL; TOPICAL at 18:04

## 2018-10-17 RX ADMIN — OXYCODONE HYDROCHLORIDE 5 MG: 5 TABLET ORAL at 19:41

## 2018-10-17 RX ADMIN — IPRATROPIUM BROMIDE AND ALBUTEROL SULFATE 3 ML: .5; 3 SOLUTION RESPIRATORY (INHALATION) at 10:53

## 2018-10-17 RX ADMIN — KETOROLAC TROMETHAMINE 15 MG: 30 INJECTION, SOLUTION INTRAMUSCULAR at 02:03

## 2018-10-17 RX ADMIN — GABAPENTIN 600 MG: 300 CAPSULE ORAL at 18:04

## 2018-10-17 RX ADMIN — GUAIFENESIN 600 MG: 600 TABLET, EXTENDED RELEASE ORAL at 18:04

## 2018-10-17 RX ADMIN — IPRATROPIUM BROMIDE AND ALBUTEROL SULFATE 3 ML: .5; 3 SOLUTION RESPIRATORY (INHALATION) at 07:33

## 2018-10-17 RX ADMIN — OMEPRAZOLE 20 MG: 20 CAPSULE, DELAYED RELEASE ORAL at 18:04

## 2018-10-17 RX ADMIN — PREDNISONE 60 MG: 20 TABLET ORAL at 09:41

## 2018-10-17 RX ADMIN — GUAIFENESIN 600 MG: 600 TABLET, EXTENDED RELEASE ORAL at 09:42

## 2018-10-17 RX ADMIN — ATORVASTATIN CALCIUM 40 MG: 40 TABLET, FILM COATED ORAL at 18:03

## 2018-10-17 RX ADMIN — IPRATROPIUM BROMIDE AND ALBUTEROL SULFATE 3 ML: .5; 3 SOLUTION RESPIRATORY (INHALATION) at 22:10

## 2018-10-17 RX ADMIN — LEVOFLOXACIN 750 MG: 5 INJECTION, SOLUTION INTRAVENOUS at 06:07

## 2018-10-17 RX ADMIN — OXYCODONE HYDROCHLORIDE 5 MG: 5 TABLET ORAL at 09:44

## 2018-10-17 RX ADMIN — SENNOSIDES AND DOCUSATE SODIUM 2 TABLET: 8.6; 5 TABLET ORAL at 06:01

## 2018-10-17 RX ADMIN — SENNOSIDES AND DOCUSATE SODIUM 2 TABLET: 8.6; 5 TABLET ORAL at 18:04

## 2018-10-17 RX ADMIN — OXYCODONE HYDROCHLORIDE 5 MG: 5 TABLET ORAL at 14:07

## 2018-10-17 RX ADMIN — IPRATROPIUM BROMIDE AND ALBUTEROL SULFATE 3 ML: .5; 3 SOLUTION RESPIRATORY (INHALATION) at 02:26

## 2018-10-17 RX ADMIN — POTASSIUM CHLORIDE 60 MEQ: 1500 TABLET, EXTENDED RELEASE ORAL at 07:45

## 2018-10-17 RX ADMIN — ALPRAZOLAM 1 MG: 1 TABLET ORAL at 23:11

## 2018-10-17 RX ADMIN — ALPRAZOLAM 1 MG: 1 TABLET ORAL at 06:02

## 2018-10-17 RX ADMIN — ENOXAPARIN SODIUM 40 MG: 100 INJECTION SUBCUTANEOUS at 06:06

## 2018-10-17 RX ADMIN — GABAPENTIN 600 MG: 300 CAPSULE ORAL at 11:44

## 2018-10-17 RX ADMIN — ALPRAZOLAM 1 MG: 1 TABLET ORAL at 15:03

## 2018-10-17 RX ADMIN — METHYLPREDNISOLONE SODIUM SUCCINATE 40 MG: 40 INJECTION, POWDER, FOR SOLUTION INTRAMUSCULAR; INTRAVENOUS at 06:10

## 2018-10-17 RX ADMIN — ASPIRIN 325 MG: 325 TABLET, COATED ORAL at 06:02

## 2018-10-17 RX ADMIN — GABAPENTIN 600 MG: 300 CAPSULE ORAL at 06:02

## 2018-10-17 RX ADMIN — LEVOTHYROXINE SODIUM 62.5 MCG: 0.12 TABLET ORAL at 06:01

## 2018-10-17 ASSESSMENT — ENCOUNTER SYMPTOMS
POLYDIPSIA: 0
HEARTBURN: 0
DIZZINESS: 0
DEPRESSION: 0
GASTROINTESTINAL NEGATIVE: 1
SPUTUM PRODUCTION: 1
FOCAL WEAKNESS: 0
NAUSEA: 0
FEVER: 1
VOMITING: 0
BACK PAIN: 0
WEAKNESS: 1
BLURRED VISION: 1
NERVOUS/ANXIOUS: 1
PALPITATIONS: 0
MUSCULOSKELETAL NEGATIVE: 1
COUGH: 1
BRUISES/BLEEDS EASILY: 0
CARDIOVASCULAR NEGATIVE: 1
CHILLS: 1
HEADACHES: 0
SHORTNESS OF BREATH: 1
NECK PAIN: 0

## 2018-10-17 ASSESSMENT — PAIN SCALES - GENERAL
PAINLEVEL_OUTOF10: 6
PAINLEVEL_OUTOF10: 8
PAINLEVEL_OUTOF10: 5
PAINLEVEL_OUTOF10: 6
PAINLEVEL_OUTOF10: 7
PAINLEVEL_OUTOF10: 8
PAINLEVEL_OUTOF10: 6
PAINLEVEL_OUTOF10: 8
PAINLEVEL_OUTOF10: 0
PAINLEVEL_OUTOF10: 8
PAINLEVEL_OUTOF10: 7
PAINLEVEL_OUTOF10: 9
PAINLEVEL_OUTOF10: 6
PAINLEVEL_OUTOF10: 8
PAINLEVEL_OUTOF10: 6

## 2018-10-17 ASSESSMENT — PULMONARY FUNCTION TESTS: EPAP_CMH2O: 8

## 2018-10-17 NOTE — CARE PLAN
Problem: Bronchoconstriction:  Goal: Improve in air movement and diminished wheezing  Outcome: PROGRESSING AS EXPECTED    Intervention: Implement inhaled treatments  Q4/SVN with Duoneb      Problem: Oxygenation:  Goal: Maintain adequate oxygenation dependent on patient condition  Outcome: PROGRESSING SLOWER THAN EXPECTED    Intervention: Manage oxygen therapy by monitoring pulse oximetry and/or ABG values  Pt on continuous Sp02 monitor and between 12-15 LPM of Oxygen

## 2018-10-17 NOTE — CARE PLAN
Problem: Pain Management  Goal: Pain level will decrease to patient's comfort goal  Outcome: PROGRESSING AS EXPECTED    Intervention: Follow pain managment plan developed in collaboration with patient and Interdisciplinary Team  Ibuprofen prn, see MAR.       Problem: Respiratory:  Goal: Respiratory status will improve  Outcome: PROGRESSING SLOWER THAN EXPECTED    Intervention: Administer and titrate oxygen therapy  Pt on Bipap.

## 2018-10-17 NOTE — FLOWSHEET NOTE
10/17/18 0735   Events/Summary/Plan   Events/Summary/Plan SVN tx, pt still on HFNC at 12L   Interdisciplinary Plan of Care-Goals (Indications)   Bronchodilator Indications History / Diagnosis   Interdisciplinary Plan of Care-Outcomes    Bronchodilator Outcome Patient at Stable Baseline   Education   Education Yes - Pt. / Family has been Instructed in use of Respiratory Equipment   SVN Group   #SVN Performed Yes   Given By: Mouthpiece   Date SVN Last Changed 10/17/18   Date SVN Next Change Due (Q 7 Days) 10/24/18   Chest Exam   Respiration 20   Pulse 66   Heart Rate (Monitored) 66   Work Of Breathing / Effort Mild   Breath Sounds   RUL Breath Sounds Clear   RML Breath Sounds Crackles   RLL Breath Sounds Crackles   BETH Breath Sounds Clear   LLL Breath Sounds Crackles   Pre/Post Intervention Pre Intervention Assessment   Secretions   Cough Moist;Non Productive   Oximetry   Continuous Oximetry Yes   Oxygen   Pulse Oximetry 93 %   O2 (LPM) 12  (found on 12L)   O2 Daily Delivery Respiratory  Highflow Nasal Cannula

## 2018-10-17 NOTE — CARE PLAN
Problem: Pain Management  Goal: Pain level will decrease to patient's comfort goal  Outcome: PROGRESSING AS EXPECTED    Intervention: Follow pain managment plan developed in collaboration with patient and Interdisciplinary Team  PRN oxycodone (see MAR). Heat packs per pt request.       Problem: Respiratory:  Goal: Respiratory status will improve  Outcome: PROGRESSING AS EXPECTED    Intervention: Administer and titrate oxygen therapy  Pt oxygen delivery decreased to 10L.

## 2018-10-17 NOTE — CARE PLAN
Problem: Safety  Goal: Will remain free from injury  Outcome: PROGRESSING AS EXPECTED  SBA. CLIP. Pt calls for assist appropriately. Hourly rounding. Personal belongings within reach. Non-skid socks. Bed alarm on. Bed locked & in low position.          Problem: Pain Management  Goal: Pain level will decrease to patient's comfort goal  Outcome: PROGRESSING AS EXPECTED  C/o lower back and bilateral flank pain. Verbalizes pain using 0-10 scale. New orders for pain medication recieved. Medicated as needed. Non-pharmacologic options discussed and offered.

## 2018-10-17 NOTE — FLOWSHEET NOTE
10/17/18 1054   Events/Summary/Plan   Events/Summary/Plan SVN tx, pt on 15L HFNC   Interdisciplinary Plan of Care-Goals (Indications)   Bronchodilator Indications History / Diagnosis   Interdisciplinary Plan of Care-Outcomes    Bronchodilator Outcome Improvement in Airflow (peak flow, PFT)   Education   Education Yes - Pt. / Family has been Instructed in use of Respiratory Equipment   SVN Group   #SVN Performed Yes   Given By: Mouthpiece   Chest Exam   Respiration (!) 23   Pulse 73   Heart Rate (Monitored) 74   Work Of Breathing / Effort Mild   Breath Sounds   RUL Breath Sounds Clear   RML Breath Sounds Coarse Crackles   RLL Breath Sounds Coarse Crackles   BETH Breath Sounds Clear   LLL Breath Sounds Coarse Crackles   Secretions   Cough Moist;Non Productive   Oximetry   Continuous Oximetry Yes   Oxygen   Pulse Oximetry 96 %   O2 (LPM) 12   O2 Daily Delivery Respiratory  Highflow Nasal Cannula

## 2018-10-17 NOTE — CARE PLAN
Problem: Ventilation Defect:  Goal: Ability to achieve and maintain unassisted ventilation or tolerate decreased levels of ventilator support  Pt currently on BiPAP12/8 80% not tolerating coming off as she desats to low 70's, duo q4

## 2018-10-17 NOTE — ASSESSMENT & PLAN NOTE
With a history of reported thalassemia  Microcytosis  Currently positive occult blood  Was on high-dose steroids  Question of upper GI irritation  H&H stable

## 2018-10-17 NOTE — PROGRESS NOTES
"Interval History:  43-year-old female with a history of Graves' disease and asthma/COPD who is an ongoing smoker and is currently managed on prednisone presents with 5 days of progressive dyspnea fevers and chills.  10/17: Feeling improved, ins and outs balanced.    Physical Exam   Blood pressure (!) 87/51, pulse 66, temperature 37.2 °C (99 °F), resp. rate 20, height 1.727 m (5' 8\"), weight 71 kg (156 lb 8.4 oz), last menstrual period 01/17/2015, SpO2 93 %.    Constitutional:  Appears well-developed.   HENT: Normocephalic and atraumatic. No scleral icterus.   Neck: No JVD present.   Cardiovascular: Normal rate. Exam reveals no gallop and no friction rub. No murmur heard.   Pulmonary/Chest: Coarse  Abdominal: S/NT/ND BS+   Musculoskeletal: Exhibits no edema. Pulses present.  Skin: Skin is warm and dry.     ROS: As HPI other reviewed and negative       Intake/Output Summary (Last 24 hours) at 10/17/18 0818  Last data filed at 10/17/18 0600   Gross per 24 hour   Intake             2789 ml   Output             2585 ml   Net              204 ml       Recent Labs      10/16/18   0830  10/17/18   0415   WBC  15.6*  8.9   RBC  5.26  4.04*   HEMOGLOBIN  11.2*  8.4*   HEMATOCRIT  34.7*  26.7*   MCV  66.0*  65.3*   MCH  21.3*  20.8*   MCHC  32.3*  31.8*   RDW  38.8  39.4   PLATELETCT  150*  116*   MPV  9.8  9.7     Recent Labs      10/16/18   0830  10/16/18   1834  10/17/18   0415   SODIUM  139  137  140   POTASSIUM  3.6  3.6  3.5*   CHLORIDE  107  108  105   CO2  22  25  27   GLUCOSE  121*  134*  166*   BUN  18  13  13   CREATININE  0.64  0.64  0.58   CALCIUM  8.8  7.9*  8.5         Recent Labs      10/16/18   0830   BNPBTYPENAT  96     Recent Labs      10/16/18   0830  10/16/18   1834   TROPONINI  2.78*  1.95*   BNPBTYPENAT  96   --            No current facility-administered medications on file prior to encounter.      Current Outpatient Prescriptions on File Prior to Encounter   Medication Sig Dispense Refill   • VENTOLIN "  (90 BASE) MCG/ACT Aero Soln inhalation aerosol INHALE TWO PUFFS BY MOUTH EVERY 6 HOURS AS NEEDED FOR SHORTNESS OF BREATH 1 Inhaler 0   • gabapentin (NEURONTIN) 600 MG tablet Take 1 Tab by mouth 3 times a day. 90 Tab 11   • ibuprofen (MOTRIN) 800 MG Tab Take 800 mg by mouth every day.     • alprazolam (XANAX) 1 MG TABS Take 1 mg by mouth 3 times a day as needed. Indications: Feeling Anxious, Trouble Sleeping         Current Facility-Administered Medications   Medication Dose Frequency Provider Last Rate Last Dose   • ALPRAZolam (XANAX) tablet 1 mg  1 mg TID PRN Yunior Villalobos M.D.   1 mg at 10/17/18 0602   • gabapentin (NEURONTIN) capsule 600 mg  600 mg TID Yunior Villalobos M.D.   600 mg at 10/17/18 0602   • levothyroxine (SYNTHROID) tablet 62.5 mcg  62.5 mcg DAILY Yunior Villalobos M.D.   62.5 mcg at 10/17/18 0601   • traZODone (DESYREL) tablet 100 mg  100 mg HS PRN Yunior Villalobos M.D.       • Respiratory Care per Protocol   Continuous RT Yunior Villalobos M.D.       • senna-docusate (PERICOLACE or SENOKOT S) 8.6-50 MG per tablet 2 Tab  2 Tab BID Yunior Villalobos M.D.   2 Tab at 10/17/18 0601    And   • polyethylene glycol/lytes (MIRALAX) PACKET 1 Packet  1 Packet QDAY PRN Yunior Villalobos M.D.        And   • magnesium hydroxide (MILK OF MAGNESIA) suspension 30 mL  30 mL QDAY PRN Yunior Villalobos M.D.        And   • bisacodyl (DULCOLAX) suppository 10 mg  10 mg QDAY PRN Yunior Villalobos M.D.       • acetaminophen (TYLENOL) tablet 650 mg  650 mg Q6HRS PRN Yunior Villalobos M.D.       • levoFLOXacin in D5W (LEVAQUIN) IVPB 750 mg  750 mg Q24HRS Yunior Villalobos M.D.   Stopped at 10/17/18 0737   • ondansetron (ZOFRAN) syringe/vial injection 4 mg  4 mg Q4HRS PRN Yunior Villalobos M.D.       • ondansetron (ZOFRAN ODT) dispertab 4 mg  4 mg Q4HRS PRN Yunior Villalobos M.D.       • promethazine (PHENERGAN) tablet 12.5-25 mg  12.5-25 mg Q4HRS PRN Yunior Villalobos M.D.       • promethazine (PHENERGAN) suppository 12.5-25 mg  12.5-25 mg Q4HRS PRN Yunior Villalobos M.D.       • prochlorperazine  (COMPAZINE) injection 5-10 mg  5-10 mg Q4HRS PRN Yunior Villalobos M.D.       • methylPREDNISolone (SOLU-MEDROL) 40 MG injection 40 mg  40 mg Q6HRS Yunior Villalobos M.D.   40 mg at 10/17/18 0610   • ipratropium-albuterol (DUONEB) nebulizer solution  3 mL Q4HRS (RT) Yunior Villalobos M.D.   3 mL at 10/17/18 0733   • MD Alert...ICU Electrolyte Replacement per Pharmacy   pharmacy to dose Kei Curiel M.D.       • enoxaparin (LOVENOX) inj 40 mg  40 mg DAILY Kei Curiel M.D.   40 mg at 10/17/18 0606   • aspirin (ASA) tablet 325 mg  325 mg DAILY Yunior Villalobos M.D.   325 mg at 10/17/18 0602   • atorvastatin (LIPITOR) tablet 40 mg  40 mg Q EVENING Yunior Villalobos M.D.   40 mg at 10/16/18 1655   • tramadol (ULTRAM) 50 MG tablet 50 mg  50 mg Once Jos Sepulveda M.D.       • ibuprofen (MOTRIN) tablet 600 mg  600 mg Q6HRS PRN Kei Curiel M.D.   600 mg at 10/17/18 0635   • norepinephrine (LEVOPHED) 8 mg in  mL Infusion  0-30 mcg/min Continuous Kei Curiel M.D.   Stopped at 10/16/18 1715   • ketorolac (TORADOL) injection 15-30 mg  15-30 mg Q6HRS PRN Miguel A Hernandes M.D.   15 mg at 10/17/18 0203     Last reviewed on 10/16/2018 10:19 AM by Makenzie Carmona  Medications reviewed    Imaging reviewed    ECHO(10/16/18):  No prior study is available for comparison.   Normal left ventricular systolic function.  Left ventricular ejection fraction is visually estimated to be 55%.  Right heart pressures are consistent with moderate pulmonary   hypertension of 50 mmHg.  Moderately dilated right ventricle.  Normal right ventricular systolic function.    Impressions:  1.  Hypoxemic respiratory failure  2.  Sepsis most likely related to multifocal pneumonia  3.  Non-ST elevation microinfarction, type II demand  4.  Immune compromised host on chronic steroid therapy  5.  Graves' disease  6.  Tobacco abuse  7. Hypokalemia    Recommendations:  Troponin down-trending, likely sequela of sepsis and hypoxia. Appeared mildly volume overloaded on  echo yesterday.     --ASA/statin  --Diuresis as tolerated, indicated  --Holding ACEI/BB due to hypotension  --Outpatient stress testing once recovered  --Ongoing Rx hypoxia and pneumonia, improving    Discussed with primary physician and bedside nursing.

## 2018-10-17 NOTE — PROGRESS NOTES
Critical Care Progress Note    Date of admission  10/16/2018    Chief Complaint  43 y.o. female admitted 10/16/2018 with sepsis, lactic acidosis, elevated troponin and respiratory failure requiring BiPAP in the ED    Hospital Course    43 y.o. female who presented 10/16/2018 past medical history of COPD and asthma.  She also has a history of  hypothyroid with Graves' disease.  She was following with a local endocrinologist, however they would not prescribe her steroids, and she saw the endocrinologist in [place], started on steroids 1 month ago, 40 mg daily.  She is not on any Bactrim prophylactic dosing.  She does not have a cough.  She feels that her chest is tight, she has had difficulty breathing for 3 days, she thought it was an asthma exacerbation and took her inhalers, she is supposed to be on Brio Ellipta and Spiriva at home, she still smokes cigarettes, she did not get better and today felt like she could not breathe and her  brought her to the hospital.  She denies any history of cardiac disease.  Her chest x-ray shows diffuse infiltrates, suspicious for either volume overload or potentially Arntson and an atypical infection.  She has not had any sick contacts recently.  She does not currently have chest pain.  She continues to wheeze.  In the ER she was hypoxic on nasal cannula and oxygen mask, transitioned to BiPAP and remains saturating 90-91% on 80% FiO2, BiPAP settings 12/8.  She currently feels better.  She denies any abdominal pain, dysuria, polyuria, headaches, acute motor or sensory changes, or lower extremity swelling.  She had a blood clot when she was younger secondary to a leg injury.  She is not currently on any anticoagulation.  Initially she was hypotensive in the emergency room down to 70 systolic, and received IV fluid resuscitation.  Other medical past medical history includes anxiety, depression, asthma, beta thalassemia, chronic pain, and has a history of opiate use and  seizures.  She says she is not currently on any narcotics and she does not use IV or recreational drugs other than occasional marijuana.  She does not drink alcohol.      Interval Problem Update  Reviewed last 24 hour events:  Afebrile  Good uop  12 - 15L NC, off bipap 11 pm, remains off  Pred 60 at home for Graves' disease  Off NSAIDs  PPI  Day 2 Levaquin for ? CAP  HH down  Replacing K    Review of Systems  Review of Systems   Unable to perform ROS: Acuity of condition        Vital Signs for last 24 hours   Temp:  [37 °C (98.6 °F)-37.7 °C (99.9 °F)] 37.2 °C (99 °F)  Pulse:  [57-99] (P) 93  Resp:  [12-39] (P) 21    Hemodynamic parameters for last 24 hours       Vent Settings for last 24 hours       Physical Exam   Physical Exam   Constitutional: She appears well-developed.   HENT:   Head: Normocephalic and atraumatic.   Eyes: Pupils are equal, round, and reactive to light.   Neck: Neck supple. No tracheal deviation present.   Cardiovascular: Normal rate.    No murmur heard.  Pulmonary/Chest: She has rales.   Diminished throughout, scattered coarse crackles,   Abdominal: Soft. She exhibits no distension. There is no tenderness.   Musculoskeletal: She exhibits no edema.   Neurological: She is alert. No cranial nerve deficit. Coordination normal.   Skin: Skin is warm and dry.       Medications  Current Facility-Administered Medications   Medication Dose Route Frequency Provider Last Rate Last Dose   • predniSONE (DELTASONE) tablet 60 mg  60 mg Oral DAILY Rodolfo Stinson M.D.       • guaiFENesin LA (MUCINEX) tablet 600 mg  600 mg Oral Q12HRS Rodolfo Stinson M.D.       • omeprazole (PRILOSEC) capsule 20 mg  20 mg Oral BID Rodolfo Stinson M.D.       • oxyCODONE immediate-release (ROXICODONE) tablet 5 mg  5 mg Oral Q4HRS PRN Rodolfo Stinson M.D.       • nicotine (NICODERM) 21 MG/24HR 21 mg  21 mg Transdermal Daily-0600 Rodolfo Stinson M.D.        And   • nicotine polacrilex (NICORETTE) 2 MG piece 2 mg   2 mg Oral Q HOUR PRN Rodolfo Stinson M.D.       • ALPRAZolam (XANAX) tablet 1 mg  1 mg Oral TID PRN Yunior Villalobos M.D.   1 mg at 10/17/18 0602   • gabapentin (NEURONTIN) capsule 600 mg  600 mg Oral TID Yunior Villalobos M.D.   600 mg at 10/17/18 0602   • levothyroxine (SYNTHROID) tablet 62.5 mcg  62.5 mcg Oral DAILY Yunior Villalobos M.D.   62.5 mcg at 10/17/18 0601   • traZODone (DESYREL) tablet 100 mg  100 mg Oral HS PRN Yunior Villalobos M.D.       • Respiratory Care per Protocol   Nebulization Continuous RT Yunior Villalobos M.D.       • senna-docusate (PERICOLACE or SENOKOT S) 8.6-50 MG per tablet 2 Tab  2 Tab Oral BID Yunior Villalobos M.D.   2 Tab at 10/17/18 0601    And   • polyethylene glycol/lytes (MIRALAX) PACKET 1 Packet  1 Packet Oral QDAY PRN Yunior Villalobos M.D.        And   • magnesium hydroxide (MILK OF MAGNESIA) suspension 30 mL  30 mL Oral QDAY PRN Yunior Villalobos M.D.        And   • bisacodyl (DULCOLAX) suppository 10 mg  10 mg Rectal QDAY PRN Yunior Villalobos M.D.       • acetaminophen (TYLENOL) tablet 650 mg  650 mg Oral Q6HRS PRN Yunior Villalobos M.D.       • levoFLOXacin in D5W (LEVAQUIN) IVPB 750 mg  750 mg Intravenous Q24HRS Yunior Villalobos M.D.   Stopped at 10/17/18 0737   • ondansetron (ZOFRAN) syringe/vial injection 4 mg  4 mg Intravenous Q4HRS PRN Yunior Villalobos M.D.       • ondansetron (ZOFRAN ODT) dispertab 4 mg  4 mg Oral Q4HRS PRN Yunior Villalobos M.D.       • promethazine (PHENERGAN) tablet 12.5-25 mg  12.5-25 mg Oral Q4HRS PRN Yunior Villalobos M.D.       • promethazine (PHENERGAN) suppository 12.5-25 mg  12.5-25 mg Rectal Q4HRS PRN Yunior Villalobos M.D.       • prochlorperazine (COMPAZINE) injection 5-10 mg  5-10 mg Intravenous Q4HRS PRN Yunior Villalobos M.D.       • ipratropium-albuterol (DUONEB) nebulizer solution  3 mL Nebulization Q4HRS (RT) Yunior Villalobos M.D.   3 mL at 10/17/18 0733   • MD Alert...ICU Electrolyte Replacement per Pharmacy   Other pharmacy to dose Kei Curiel M.D.       • enoxaparin (LOVENOX) inj 40 mg  40 mg Subcutaneous DAILY  Kei Curiel M.D.   40 mg at 10/17/18 0606   • aspirin (ASA) tablet 325 mg  325 mg Oral DAILY Yunior Villalobos M.D.   325 mg at 10/17/18 0602   • atorvastatin (LIPITOR) tablet 40 mg  40 mg Oral Q EVENING Yunior Villalobos M.D.   40 mg at 10/16/18 1655   • tramadol (ULTRAM) 50 MG tablet 50 mg  50 mg Oral Once Jos Sepulveda M.D.       • norepinephrine (LEVOPHED) 8 mg in  mL Infusion  0-30 mcg/min Intravenous Continuous Kei Curiel M.D.   Stopped at 10/16/18 1715       Fluids    Intake/Output Summary (Last 24 hours) at 10/17/18 0920  Last data filed at 10/17/18 0800   Gross per 24 hour   Intake             3039 ml   Output             2600 ml   Net              439 ml       Laboratory  Recent Results (from the past 48 hour(s))   CBC WITH DIFFERENTIAL    Collection Time: 10/16/18  8:30 AM   Result Value Ref Range    WBC 15.6 (H) 4.8 - 10.8 K/uL    RBC 5.26 4.20 - 5.40 M/uL    Hemoglobin 11.2 (L) 12.0 - 16.0 g/dL    Hematocrit 34.7 (L) 37.0 - 47.0 %    MCV 66.0 (L) 81.4 - 97.8 fL    MCH 21.3 (L) 27.0 - 33.0 pg    MCHC 32.3 (L) 33.6 - 35.0 g/dL    RDW 38.8 35.9 - 50.0 fL    Platelet Count 150 (L) 164 - 446 K/uL    MPV 9.8 9.0 - 12.9 fL    Neutrophils-Polys 90.10 (H) 44.00 - 72.00 %    Lymphocytes 7.30 (L) 22.00 - 41.00 %    Monocytes 1.40 0.00 - 13.40 %    Eosinophils 0.50 0.00 - 6.90 %    Basophils 0.10 0.00 - 1.80 %    Immature Granulocytes 0.60 0.00 - 0.90 %    Nucleated RBC 0.00 /100 WBC    Neutrophils (Absolute) 14.02 (H) 2.00 - 7.15 K/uL    Lymphs (Absolute) 1.13 1.00 - 4.80 K/uL    Monos (Absolute) 0.22 0.00 - 0.85 K/uL    Eos (Absolute) 0.08 0.00 - 0.51 K/uL    Baso (Absolute) 0.02 0.00 - 0.12 K/uL    Immature Granulocytes (abs) 0.09 0.00 - 0.11 K/uL    NRBC (Absolute) 0.00 K/uL   COMP METABOLIC PANEL    Collection Time: 10/16/18  8:30 AM   Result Value Ref Range    Sodium 139 135 - 145 mmol/L    Potassium 3.6 3.6 - 5.5 mmol/L    Chloride 107 96 - 112 mmol/L    Co2 22 20 - 33 mmol/L    Anion Gap 10.0 0.0 - 11.9     Glucose 121 (H) 65 - 99 mg/dL    Bun 18 8 - 22 mg/dL    Creatinine 0.64 0.50 - 1.40 mg/dL    Calcium 8.8 8.5 - 10.5 mg/dL    AST(SGOT) 29 12 - 45 U/L    ALT(SGPT) 8 2 - 50 U/L    Alkaline Phosphatase 72 30 - 99 U/L    Total Bilirubin 1.5 0.1 - 1.5 mg/dL    Albumin 3.8 3.2 - 4.9 g/dL    Total Protein 6.3 6.0 - 8.2 g/dL    Globulin 2.5 1.9 - 3.5 g/dL    A-G Ratio 1.5 g/dL   LIPASE    Collection Time: 10/16/18  8:30 AM   Result Value Ref Range    Lipase 3 (L) 11 - 82 U/L   TROPONIN    Collection Time: 10/16/18  8:30 AM   Result Value Ref Range    Troponin I 2.78 (H) 0.00 - 0.04 ng/mL   BTYPE NATRIURETIC PEPTIDE    Collection Time: 10/16/18  8:30 AM   Result Value Ref Range    B Natriuretic Peptide 96 0 - 100 pg/mL   LACTIC ACID    Collection Time: 10/16/18  8:30 AM   Result Value Ref Range    Lactic Acid 1.7 0.5 - 2.0 mmol/L   TSH    Collection Time: 10/16/18  8:30 AM   Result Value Ref Range    TSH 0.130 (L) 0.380 - 5.330 uIU/mL   FREE THYROXINE    Collection Time: 10/16/18  8:30 AM   Result Value Ref Range    Free T-4 1.06 0.53 - 1.43 ng/dL   ESTIMATED GFR    Collection Time: 10/16/18  8:30 AM   Result Value Ref Range    GFR If African American >60 >60 mL/min/1.73 m 2    GFR If Non African American >60 >60 mL/min/1.73 m 2   BLOOD CULTURE    Collection Time: 10/16/18  8:50 AM   Result Value Ref Range    Significant Indicator NEG     Source BLD     Site PERIPHERAL     Blood Culture       No Growth    Note: Blood cultures are incubated for 5 days and  are monitored continuously.Positive blood cultures  are called to the RN and reported as soon as  they are identified.     EKG (NOW)    Collection Time: 10/16/18  8:57 AM   Result Value Ref Range    Report       [facility]    Test Date:  2018-10-16  Pt Name:    DENA CASILLAS              Department: ER  MRN:        [id number]                      Room:        11  Gender:     Female                       Technician: [id number]  :         [id number]                   Requested By:LAURITA PINK  Order #:    [id number]                    Reading MD: LAURITA PINK MD    Measurements  Intervals                                Axis  Rate:       103                          P:          46  CT:         124                          QRS:        70  QRSD:       94                           T:          31  QT:         360  QTc:        471    Interpretive Statements  SINUS TACHYCARDIA  PROBABLE LEFT ATRIAL ABNORMALITY  RSR' IN V1 OR V2, RIGHT VCD OR RVH  Compared to ECG 01/31/2018 15:12:20  Right ventricular hypertrophy now present    Electronically Signed On 10- 9:08:24 PDT by LAURITA PINK MD     BLOOD CULTURE    Collection Time: 10/16/18  9:15 AM   Result Value Ref Range    Significant Indicator NEG     Source BLD     Site PERIPHERAL     Blood Culture       No Growth    Note: Blood cultures are incubated for 5 days and  are monitored continuously.Positive blood cultures  are called to the RN and reported as soon as  they are identified.     URINALYSIS,CULTURE IF INDICATED    Collection Time: 10/16/18 11:10 AM   Result Value Ref Range    Color DK Yellow     Character Cloudy (A)     Specific Gravity 1.029 <1.035    Ph 6.0 5.0 - 8.0    Glucose Negative Negative mg/dL    Ketones 15 (A) Negative mg/dL    Protein 100 (A) Negative mg/dL    Bilirubin Negative Negative    Urobilinogen, Urine 1.0 Negative    Nitrite Negative Negative    Leukocyte Esterase Negative Negative    Occult Blood Negative Negative    Micro Urine Req Microscopic    URINE MICROSCOPIC (W/UA)    Collection Time: 10/16/18 11:10 AM   Result Value Ref Range    WBC 2-5 /hpf    RBC 2-5 (A) /hpf    Bacteria Moderate (A) None /hpf    Epithelial Cells Many (A) /hpf    Mucous Threads Moderate /hpf   ISTAT ARTERIAL BLOOD GAS    Collection Time: 10/16/18 12:00 PM   Result Value Ref Range    Ph 7.435 7.400 - 7.500    Pco2 34.1 26.0 - 37.0 mmHg    Po2 75 64 - 87 mmHg    Tco2 24 20 - 33  mmol/L    S02 95 93 - 99 %    Hco3 22.9 17.0 - 25.0 mmol/L    BE -1 -4 - 3 mmol/L    Body Temp see below degrees    O2 Therapy 80 %    iPF Ratio 94     Specimen Arterial     Action Range Triggered NO     Inst. Qualified Patient YES    EC-ECHOCARDIOGRAM COMPLETE W/O CONT    Collection Time: 10/16/18 12:59 PM   Result Value Ref Range    Eject.Frac. MOD 4C 56.42     Left Ventrical Ejection Fraction 55    EKG    Collection Time: 10/16/18  2:06 PM   Result Value Ref Range    Report       [facility]    Test Date:  2018-10-16  Pt Name:    DENA CASILLAS              Department: ER  MRN:        [id number]                      Room:       [facility]  Gender:     Female                       Technician: [facility]  :        [id number]                   Requested By:HUNTER UMANZOR  Order #:    [id number]                    Reading MD: Alex Jorge MD    Measurements  Intervals                                Axis  Rate:       68                           P:          24  FL:         136                          QRS:        45  QRSD:       90                           T:          25  QT:         396  QTc:        422    Interpretive Statements  SINUS RHYTHM      Electronically Signed On 10- 16:29:44 PDT by Alex Jorge MD     INFLUENZA A/B BY PCR    Collection Time: 10/16/18  2:15 PM   Result Value Ref Range    Influenza virus A RNA Negative Negative    Influenza virus B, PCR Negative Negative   PEDIATRIC RESPIRATORY PANEL BY PCR    Collection Time: 10/16/18  2:15 PM   Result Value Ref Range    Adenovirus, PCR Not Detected     Coronavirus, PCR Not Detected     Human Metapneumovirus, PCR Not Detected     Rhinovirus / Enterovirus, PCR Not Detected     Influenza virus A RNA Not Detected     Influenza virus A H1 RNA Not Detected     Influenza A 2009, H1N1, PCR Not Detected     Influenza virus A H3 RNA Not Detected     Influenza virus B, PCR Not Detected     Parainfluenza virus 1, PCR Not Detected     Parainfluenza virus 2,  PCR Not Detected     Parainfluenza virus 3, PCR Not Detected     Parainfluenza 4, PCR Not Detected     Resp Syncytial Virus A, PCR Not Detected     Resp Syncytial Virus B, PCR Not Detected     Chlamydia pneumoniae, PCR Not Detected     Mycoplasma pneumoniae, PCR Not Detected    TROPONIN    Collection Time: 10/16/18  6:34 PM   Result Value Ref Range    Troponin I 1.95 (H) 0.00 - 0.04 ng/mL   BASIC METABOLIC PANEL    Collection Time: 10/16/18  6:34 PM   Result Value Ref Range    Sodium 137 135 - 145 mmol/L    Potassium 3.6 3.6 - 5.5 mmol/L    Chloride 108 96 - 112 mmol/L    Co2 25 20 - 33 mmol/L    Glucose 134 (H) 65 - 99 mg/dL    Bun 13 8 - 22 mg/dL    Creatinine 0.64 0.50 - 1.40 mg/dL    Calcium 7.9 (L) 8.5 - 10.5 mg/dL    Anion Gap 4.0 0.0 - 11.9   ESTIMATED GFR    Collection Time: 10/16/18  6:34 PM   Result Value Ref Range    GFR If African American >60 >60 mL/min/1.73 m 2    GFR If Non African American >60 >60 mL/min/1.73 m 2   CBC with Differential    Collection Time: 10/17/18  4:15 AM   Result Value Ref Range    WBC 8.9 4.8 - 10.8 K/uL    RBC 4.04 (L) 4.20 - 5.40 M/uL    Hemoglobin 8.4 (L) 12.0 - 16.0 g/dL    Hematocrit 26.7 (L) 37.0 - 47.0 %    MCV 65.3 (L) 81.4 - 97.8 fL    MCH 20.8 (L) 27.0 - 33.0 pg    MCHC 31.8 (L) 33.6 - 35.0 g/dL    RDW 39.4 35.9 - 50.0 fL    Platelet Count 116 (L) 164 - 446 K/uL    MPV 9.7 9.0 - 12.9 fL    Neutrophils-Polys 92.70 (H) 44.00 - 72.00 %    Lymphocytes 5.40 (L) 22.00 - 41.00 %    Monocytes 1.50 0.00 - 13.40 %    Eosinophils 0.00 0.00 - 6.90 %    Basophils 0.00 0.00 - 1.80 %    Immature Granulocytes 0.40 0.00 - 0.90 %    Nucleated RBC 0.00 /100 WBC    Neutrophils (Absolute) 8.25 (H) 2.00 - 7.15 K/uL    Lymphs (Absolute) 0.48 (L) 1.00 - 4.80 K/uL    Monos (Absolute) 0.13 0.00 - 0.85 K/uL    Eos (Absolute) 0.00 0.00 - 0.51 K/uL    Baso (Absolute) 0.00 0.00 - 0.12 K/uL    Immature Granulocytes (abs) 0.04 0.00 - 0.11 K/uL    NRBC (Absolute) 0.00 K/uL   Basic Metabolic Panel (BMP)     Collection Time: 10/17/18  4:15 AM   Result Value Ref Range    Sodium 140 135 - 145 mmol/L    Potassium 3.5 (L) 3.6 - 5.5 mmol/L    Chloride 105 96 - 112 mmol/L    Co2 27 20 - 33 mmol/L    Glucose 166 (H) 65 - 99 mg/dL    Bun 13 8 - 22 mg/dL    Creatinine 0.58 0.50 - 1.40 mg/dL    Calcium 8.5 8.5 - 10.5 mg/dL    Anion Gap 8.0 0.0 - 11.9   Magnesium    Collection Time: 10/17/18  4:15 AM   Result Value Ref Range    Magnesium 2.0 1.5 - 2.5 mg/dL   Phosphorus    Collection Time: 10/17/18  4:15 AM   Result Value Ref Range    Phosphorus 3.1 2.5 - 4.5 mg/dL   ESTIMATED GFR    Collection Time: 10/17/18  4:15 AM   Result Value Ref Range    GFR If African American >60 >60 mL/min/1.73 m 2    GFR If Non African American >60 >60 mL/min/1.73 m 2       Imaging  X-Ray:  I have personally reviewed the images and compared with prior images.    Assessment/Plan  Severe sepsis (HCC)- (present on admission)   Assessment & Plan    Patient meets septic criteria.   Likely pulmonary infection.    She is severely hypoxic.   She has been on 1 month of steroids at high doses 40 mg daily.    She is not on prophylaxis for pcp.   Does not create sputum.  If intubated then bronchoscopy with BAL recommended.  On Levaquin.  Depending on response to Lasix, may consider Bactrim.  PCT low for what it is worth  Follow-up LDH and induce sputum for culture and PCP DFA  Continue empiric levofloxacin and follow chest imaging        Shock (HCC)   Assessment & Plan    This is likely septic shock, improved  Follow-up echocardiogram,        Elevated troponin- (present on admission)   Assessment & Plan    This is likely demand ischemia, secondary to hypoxic respiratory failure.  Patient does not have any chest pain.  Echocardiogram pending  Cardiology following, meds reviewed -aspirin/statin, holding beta blocker ACE due to hypotension          Leukocytosis- (present on admission)   Assessment & Plan    Patient has sepsis, likely pneumonia.  Levaquin  See  above for sepsis        Acute respiratory failure with hypoxia (HCC)- (present on admission)   Assessment & Plan    Per patient's history likely severe COPD, asthma exacerbation  Likely hypoxic respiratory failure stressed heart causing congestive heart failure, type II MI  Her BNP is normal.  Troponin elevation.  Echo pending today  Per bedside ultrasound seems hypervolemic, this may also represent arts or other atypical infection but less likely  Trial of Lasix 20 mg once, if blood pressure tolerates then repeat.  Currently on BiPAP on/off as needed  Solu-Medrol with transition to oral prednisone  RT O2 protocol  Scheduled bronchodilators  Hold home inhalers as patient unlikely be able to use at this time  This point she looks like she will avoid need for emergent intubation but will be followed very closely in the intensive care unit               VTE:  Lovenox  Ulcer: PPI  Lines: None    I have performed a physical exam and reviewed and updated ROS and Plan today (10/17/2018). In review of yesterday's note (10/16/2018), there are no changes except as documented above.     Discussed patient condition and risk of morbidity and/or mortality with Hospitalist, RN, RT and QA team  The patient remains critically ill.  Critical care time = 33 minutes in directly providing and coordinating critical care and extensive data review.  No time overlap and excludes procedures.

## 2018-10-17 NOTE — CARE PLAN
Problem: Oxygenation:  Goal: Maintain adequate oxygenation dependent on patient condition  Patient arrived from ED on BiPAP 12/8 70% Fi02  Currently on 15 LPM HFNC, BiPAP on standby at bedside  Scheduled SVNs and INH  Will titrate oxygen as tolerated, pt prefers cannula versus BiPAP

## 2018-10-17 NOTE — PROGRESS NOTES
[facility] Hospitalist Progress Note    Date of Service: 10/17/2018    Chief Complaint  43 y.o. female admitted 10/16/2018 with a history of COPD, tobacco abuse, question of asthma, presenting with shortness of breath, cough, chills, diagnosed with community-acquired pneumonia and respiratory failure, placed on BiPAP in the ER    Interval Problem Update  Patient seen and examined today. ICU Care  Care and plan discussed in IDT/Hot rounds.  Lines and assistive devices reviewed.    Patient tolerating treatment and therapies.  All Data, Medication data reviewed.  Case discussed with nursing as available.  Plan of Care reviewed with patient and notified of changes.    10/17 the patient is improved, came off BiPAP around 11:00 PM, no further increase in respiratory complaints, wet cough, patient wants to eat, manifestation of Graves' disease, she denies any other sick contact or recent travel out of state  Consultants/Specialty  Pulmonary    Disposition  TBD        Review of Systems   Constitutional: Positive for chills, fever and malaise/fatigue.   HENT: Negative.    Eyes: Positive for blurred vision.   Respiratory: Positive for cough, sputum production and shortness of breath.    Cardiovascular: Negative.  Negative for chest pain and palpitations.   Gastrointestinal: Negative.  Negative for heartburn, nausea and vomiting.   Genitourinary: Negative.  Negative for dysuria and frequency.   Musculoskeletal: Negative.  Negative for back pain and neck pain.   Skin: Negative.  Negative for itching and rash.   Neurological: Positive for weakness. Negative for dizziness, focal weakness and headaches.   Endo/Heme/Allergies: Negative.  Negative for polydipsia. Does not bruise/bleed easily.   Psychiatric/Behavioral: Negative for depression. The patient is nervous/anxious.       Physical Exam  Laboratory/Imaging   Hemodynamics  Temp (24hrs), Av.2 °C (99 °F), Min:36.7 °C (98.1 °F), Max:37.7 °C (99.9 °F)   Temperature: 37.2 °C (99  °F)  Pulse  Av.2  Min: 57  Max: 112 Heart Rate (Monitored): 66  Blood Pressure: (!) 87/51, NIBP: (!) 83/43      Respiratory      Respiration: 20, Pulse Oximetry: 93 %, O2 Daily Delivery Respiratory : Highflow Nasal Cannula     Given By:: Mouthpiece, Work Of Breathing / Effort: Mild  RUL Breath Sounds: Clear, RML Breath Sounds: Crackles, RLL Breath Sounds: Crackles, BETH Breath Sounds: Clear, LLL Breath Sounds: Crackles    Fluids    Intake/Output Summary (Last 24 hours) at 10/17/18 0746  Last data filed at 10/17/18 0600   Gross per 24 hour   Intake             2789 ml   Output             2585 ml   Net              204 ml       Nutrition  Orders Placed This Encounter   Procedures   • Diet NPO     Standing Status:   Standing     Number of Occurrences:   1     Order Specific Question:   Type:     Answer:   Now [1]     Order Specific Question:   Restrict to:     Answer:   Sips with Medications [3]     Physical Exam   Constitutional: She is oriented to person, place, and time. She appears well-developed and well-nourished.   HENT:   Head: Normocephalic and atraumatic.   Nose: Nose normal.   Mouth/Throat: Oropharynx is clear and moist.   Eyes: Pupils are equal, round, and reactive to light. Conjunctivae and EOM are normal.   Bilateral proptosis   Neck: Normal range of motion. Neck supple. No JVD present. No thyromegaly present.   Cardiovascular: Normal rate, regular rhythm and normal heart sounds.  Exam reveals no gallop and no friction rub.    Pulses:       Dorsalis pedis pulses are 2+ on the right side, and 2+ on the left side.   Capillary refill <3 secs   Pulmonary/Chest: Effort normal. She has decreased breath sounds. She has no wheezes. She has rhonchi. She has rales.   Abdominal: Soft. Bowel sounds are normal. She exhibits no distension and no mass. There is no tenderness. There is no rebound and no guarding.   Musculoskeletal: Normal range of motion. She exhibits no edema or tenderness.   Lymphadenopathy:      She has no cervical adenopathy.   Neurological: She is alert and oriented to person, place, and time. No cranial nerve deficit.   Skin: Skin is warm and dry. She is not diaphoretic. No cyanosis.   Psychiatric: She has a normal mood and affect. Her behavior is normal.   Nursing note and vitals reviewed.      Recent Labs      10/16/18   0830  10/17/18   0415   WBC  15.6*  8.9   RBC  5.26  4.04*   HEMOGLOBIN  11.2*  8.4*   HEMATOCRIT  34.7*  26.7*   MCV  66.0*  65.3*   MCH  21.3*  20.8*   MCHC  32.3*  31.8*   RDW  38.8  39.4   PLATELETCT  150*  116*   MPV  9.8  9.7     Recent Labs      10/16/18   0830  10/16/18   1834  10/17/18   0415   SODIUM  139  137  140   POTASSIUM  3.6  3.6  3.5*   CHLORIDE  107  108  105   CO2  22  25  27   GLUCOSE  121*  134*  166*   BUN  18  13  13   CREATININE  0.64  0.64  0.58   CALCIUM  8.8  7.9*  8.5         Recent Labs      10/16/18   0830   BNPBTYPENAT  96              Assessment/Plan     Severe sepsis (HCC)- (present on admission)   Assessment & Plan    This is severe sepsis with the following associated acute organ dysfunction(s): acute respiratory failure.   Related to asthma exacerbation/community-acquired pneumonia  Sepsis protocol  IV fluid  Follow culture  Antibiotic with IV levofloxacin(allergic to penicillin and cephalosporin)        Anemia   Assessment & Plan    With a history of reported thalassemia  Microcytosis  Question of GI bleed or iron deficiency        Tobacco abuse   Assessment & Plan    Nicotine replacement protocol        Graves disease   Assessment & Plan    With significant ophthalmic findings  Placed on high-dose steroids on outpatient basis        Elevated troponin- (present on admission)   Assessment & Plan    With chest pain  Likely related to demand ischemia?  Cardiology consulted  Check echo and trend trop        Leukocytosis- (present on admission)   Assessment & Plan    Related to above        Acute respiratory failure with hypoxia (HCC)- (present on  admission)   Assessment & Plan    Related to asthma/COPD exacerbation  Initially on BiPAP, weaned off  Intensivist/pulmonary consulted  Antibiotics  Steroids, the patient was on high-dose prednisone and outpatient basis  Aggressive breathing treatment, steroid, RT protocol          Quality-Core Measures   Reviewed items::  Radiology images reviewed, EKG reviewed, Labs reviewed and Medications reviewed  Bird catheter::  No Bird  DVT prophylaxis pharmacological::  Enoxaparin (Lovenox)  DVT prophylaxis - mechanical:  SCDs  Antibiotics:  Treating active infection/contamination beyond 24 hours perioperative coverage  Assessed for rehabilitation services:  Patient was assess for and/or received rehabilitation services during this hospitalization      Plan  Placed back on current dose of prednisone  Continue with antibiotics  Oxygen weaning  Await cultures  Respiratory therapy  Evaluate for cause of additional anemia  Iron studies  PPI to avoid further gastric compromise  Explained plan of care  Patient has significant risk, complex  Critically ill, but with potential to downgraded

## 2018-10-17 NOTE — FLOWSHEET NOTE
10/17/18 1538   Events/Summary/Plan   Events/Summary/Plan SVN tx   Interdisciplinary Plan of Care-Goals (Indications)   Bronchodilator Indications History / Diagnosis   Interdisciplinary Plan of Care-Outcomes    Bronchodilator Outcome Improved Vital Signs and Measures of Gas Exchange;Improvement in Airflow (peak flow, PFT)   Education   Education Yes - Pt. / Family has been Instructed in use of Respiratory Medications and Adverse Reactions   SVN Group   #SVN Performed Yes   Given By: Mouthpiece   Chest Exam   Respiration (!) 24   Pulse 66   Heart Rate (Monitored) 63   Work Of Breathing / Effort Mild   Breath Sounds   RUL Breath Sounds Clear   RML Breath Sounds Clear;Diminished   RLL Breath Sounds Crackles   BETH Breath Sounds Clear   LLL Breath Sounds Crackles   Pre/Post Intervention Pre Intervention Assessment   Secretions   Cough Moist;Non Productive   Oximetry   Continuous Oximetry Yes   Oxygen   Pulse Oximetry (!) 83 %   O2 (LPM) 12  (decreased to 10L)   O2 Daily Delivery Respiratory  Highflow Nasal Cannula

## 2018-10-18 ENCOUNTER — APPOINTMENT (OUTPATIENT)
Dept: RADIOLOGY | Facility: MEDICAL CENTER | Age: 43
DRG: 871 | End: 2018-10-18
Attending: INTERNAL MEDICINE
Payer: MEDICAID

## 2018-10-18 LAB
ALBUMIN SERPL BCP-MCNC: 3.4 G/DL (ref 3.2–4.9)
ALBUMIN/GLOB SERPL: 1.7 G/DL
ALP SERPL-CCNC: 75 U/L (ref 30–99)
ALT SERPL-CCNC: 13 U/L (ref 2–50)
ANION GAP SERPL CALC-SCNC: 8 MMOL/L (ref 0–11.9)
AST SERPL-CCNC: 17 U/L (ref 12–45)
BILIRUB SERPL-MCNC: 0.6 MG/DL (ref 0.1–1.5)
BUN SERPL-MCNC: 12 MG/DL (ref 8–22)
CALCIUM SERPL-MCNC: 8.6 MG/DL (ref 8.5–10.5)
CHLORIDE SERPL-SCNC: 105 MMOL/L (ref 96–112)
CO2 SERPL-SCNC: 28 MMOL/L (ref 20–33)
CREAT SERPL-MCNC: 0.57 MG/DL (ref 0.5–1.4)
ERYTHROCYTE [DISTWIDTH] IN BLOOD BY AUTOMATED COUNT: 39.5 FL (ref 35.9–50)
GLOBULIN SER CALC-MCNC: 2 G/DL (ref 1.9–3.5)
GLUCOSE SERPL-MCNC: 124 MG/DL (ref 65–99)
HCT VFR BLD AUTO: 26.5 % (ref 37–47)
HEMOCCULT SP1 STL QL: POSITIVE
HGB BLD-MCNC: 8.2 G/DL (ref 12–16)
LDH SERPL L TO P-CCNC: 666 U/L (ref 107–266)
MAGNESIUM SERPL-MCNC: 1.9 MG/DL (ref 1.5–2.5)
MCH RBC QN AUTO: 20.6 PG (ref 27–33)
MCHC RBC AUTO-ENTMCNC: 30.9 G/DL (ref 33.6–35)
MCV RBC AUTO: 66.6 FL (ref 81.4–97.8)
PHOSPHATE SERPL-MCNC: 2.3 MG/DL (ref 2.5–4.5)
PLATELET # BLD AUTO: 121 K/UL (ref 164–446)
PMV BLD AUTO: 9.7 FL (ref 9–12.9)
POTASSIUM SERPL-SCNC: 3.7 MMOL/L (ref 3.6–5.5)
PROT SERPL-MCNC: 5.4 G/DL (ref 6–8.2)
RBC # BLD AUTO: 3.98 M/UL (ref 4.2–5.4)
SODIUM SERPL-SCNC: 141 MMOL/L (ref 135–145)
WBC # BLD AUTO: 9.1 K/UL (ref 4.8–10.8)

## 2018-10-18 PROCEDURE — 700111 HCHG RX REV CODE 636 W/ 250 OVERRIDE (IP): Performed by: INTERNAL MEDICINE

## 2018-10-18 PROCEDURE — 302152 K-PAD 12X17: Performed by: INTERNAL MEDICINE

## 2018-10-18 PROCEDURE — 80053 COMPREHEN METABOLIC PANEL: CPT

## 2018-10-18 PROCEDURE — 71045 X-RAY EXAM CHEST 1 VIEW: CPT

## 2018-10-18 PROCEDURE — 99291 CRITICAL CARE FIRST HOUR: CPT | Performed by: INTERNAL MEDICINE

## 2018-10-18 PROCEDURE — 94640 AIRWAY INHALATION TREATMENT: CPT

## 2018-10-18 PROCEDURE — 700102 HCHG RX REV CODE 250 W/ 637 OVERRIDE(OP): Performed by: INTERNAL MEDICINE

## 2018-10-18 PROCEDURE — A9270 NON-COVERED ITEM OR SERVICE: HCPCS | Performed by: INTERNAL MEDICINE

## 2018-10-18 PROCEDURE — 700111 HCHG RX REV CODE 636 W/ 250 OVERRIDE (IP): Performed by: HOSPITALIST

## 2018-10-18 PROCEDURE — 83615 LACTATE (LD) (LDH) ENZYME: CPT

## 2018-10-18 PROCEDURE — 700102 HCHG RX REV CODE 250 W/ 637 OVERRIDE(OP): Performed by: HOSPITALIST

## 2018-10-18 PROCEDURE — A9270 NON-COVERED ITEM OR SERVICE: HCPCS | Performed by: HOSPITALIST

## 2018-10-18 PROCEDURE — 700101 HCHG RX REV CODE 250: Performed by: INTERNAL MEDICINE

## 2018-10-18 PROCEDURE — 770022 HCHG ROOM/CARE - ICU (200)

## 2018-10-18 PROCEDURE — 85027 COMPLETE CBC AUTOMATED: CPT

## 2018-10-18 PROCEDURE — 369999 HCHG MISC LAB CHARGE

## 2018-10-18 PROCEDURE — 84100 ASSAY OF PHOSPHORUS: CPT

## 2018-10-18 PROCEDURE — 99233 SBSQ HOSP IP/OBS HIGH 50: CPT | Performed by: HOSPITALIST

## 2018-10-18 PROCEDURE — 302131 K PAD MOTOR: Performed by: INTERNAL MEDICINE

## 2018-10-18 PROCEDURE — 87299 ANTIBODY DETECTION NOS IF: CPT

## 2018-10-18 PROCEDURE — 83735 ASSAY OF MAGNESIUM: CPT

## 2018-10-18 RX ORDER — IPRATROPIUM BROMIDE AND ALBUTEROL SULFATE 2.5; .5 MG/3ML; MG/3ML
3 SOLUTION RESPIRATORY (INHALATION) 4 TIMES DAILY
Status: DISCONTINUED | OUTPATIENT
Start: 2018-10-18 | End: 2018-10-21 | Stop reason: HOSPADM

## 2018-10-18 RX ORDER — SODIUM CHLORIDE 9 MG/ML
INJECTION, SOLUTION INTRAVENOUS
Status: ACTIVE
Start: 2018-10-18 | End: 2018-10-19

## 2018-10-18 RX ORDER — POTASSIUM CHLORIDE 20 MEQ/1
40 TABLET, EXTENDED RELEASE ORAL ONCE
Status: COMPLETED | OUTPATIENT
Start: 2018-10-18 | End: 2018-10-18

## 2018-10-18 RX ORDER — MAGNESIUM SULFATE HEPTAHYDRATE 40 MG/ML
2 INJECTION, SOLUTION INTRAVENOUS ONCE
Status: COMPLETED | OUTPATIENT
Start: 2018-10-18 | End: 2018-10-18

## 2018-10-18 RX ORDER — IPRATROPIUM BROMIDE AND ALBUTEROL SULFATE 2.5; .5 MG/3ML; MG/3ML
SOLUTION RESPIRATORY (INHALATION)
Status: ACTIVE
Start: 2018-10-18 | End: 2018-10-18

## 2018-10-18 RX ADMIN — LEVOFLOXACIN 750 MG: 5 INJECTION, SOLUTION INTRAVENOUS at 05:06

## 2018-10-18 RX ADMIN — OXYCODONE HYDROCHLORIDE 5 MG: 5 TABLET ORAL at 00:19

## 2018-10-18 RX ADMIN — ENOXAPARIN SODIUM 40 MG: 100 INJECTION SUBCUTANEOUS at 05:07

## 2018-10-18 RX ADMIN — CLOTRIMAZOLE 10 MG: 10 LOZENGE ORAL; TOPICAL at 05:08

## 2018-10-18 RX ADMIN — POTASSIUM CHLORIDE 40 MEQ: 1500 TABLET, EXTENDED RELEASE ORAL at 08:17

## 2018-10-18 RX ADMIN — IPRATROPIUM BROMIDE AND ALBUTEROL SULFATE 3 ML: .5; 3 SOLUTION RESPIRATORY (INHALATION) at 07:41

## 2018-10-18 RX ADMIN — DIBASIC SODIUM PHOSPHATE, MONOBASIC POTASSIUM PHOSPHATE AND MONOBASIC SODIUM PHOSPHATE 2 TABLET: 852; 155; 130 TABLET ORAL at 17:39

## 2018-10-18 RX ADMIN — OXYCODONE HYDROCHLORIDE 5 MG: 5 TABLET ORAL at 15:55

## 2018-10-18 RX ADMIN — GABAPENTIN 600 MG: 300 CAPSULE ORAL at 17:39

## 2018-10-18 RX ADMIN — GUAIFENESIN 600 MG: 600 TABLET, EXTENDED RELEASE ORAL at 17:39

## 2018-10-18 RX ADMIN — CLOTRIMAZOLE 10 MG: 10 LOZENGE ORAL; TOPICAL at 23:53

## 2018-10-18 RX ADMIN — LEVOTHYROXINE SODIUM 62.5 MCG: 0.12 TABLET ORAL at 05:06

## 2018-10-18 RX ADMIN — OXYCODONE HYDROCHLORIDE 5 MG: 5 TABLET ORAL at 20:00

## 2018-10-18 RX ADMIN — CLOTRIMAZOLE 10 MG: 10 LOZENGE ORAL; TOPICAL at 18:32

## 2018-10-18 RX ADMIN — CLOTRIMAZOLE 10 MG: 10 LOZENGE ORAL; TOPICAL at 15:55

## 2018-10-18 RX ADMIN — OMEPRAZOLE 20 MG: 20 CAPSULE, DELAYED RELEASE ORAL at 17:39

## 2018-10-18 RX ADMIN — ASPIRIN 325 MG: 325 TABLET, COATED ORAL at 05:07

## 2018-10-18 RX ADMIN — GABAPENTIN 600 MG: 300 CAPSULE ORAL at 05:07

## 2018-10-18 RX ADMIN — IPRATROPIUM BROMIDE AND ALBUTEROL SULFATE 3 ML: .5; 3 SOLUTION RESPIRATORY (INHALATION) at 19:39

## 2018-10-18 RX ADMIN — GABAPENTIN 600 MG: 300 CAPSULE ORAL at 11:07

## 2018-10-18 RX ADMIN — DIBASIC SODIUM PHOSPHATE, MONOBASIC POTASSIUM PHOSPHATE AND MONOBASIC SODIUM PHOSPHATE 2 TABLET: 852; 155; 130 TABLET ORAL at 11:07

## 2018-10-18 RX ADMIN — IPRATROPIUM BROMIDE AND ALBUTEROL SULFATE 3 ML: .5; 3 SOLUTION RESPIRATORY (INHALATION) at 11:51

## 2018-10-18 RX ADMIN — MAGNESIUM SULFATE IN WATER 2 G: 40 INJECTION, SOLUTION INTRAVENOUS at 08:17

## 2018-10-18 RX ADMIN — ALPRAZOLAM 1 MG: 1 TABLET ORAL at 18:37

## 2018-10-18 RX ADMIN — IPRATROPIUM BROMIDE AND ALBUTEROL SULFATE 3 ML: .5; 3 SOLUTION RESPIRATORY (INHALATION) at 16:01

## 2018-10-18 RX ADMIN — OXYCODONE HYDROCHLORIDE 5 MG: 5 TABLET ORAL at 04:48

## 2018-10-18 RX ADMIN — PREDNISONE 60 MG: 20 TABLET ORAL at 05:07

## 2018-10-18 RX ADMIN — SENNOSIDES AND DOCUSATE SODIUM 2 TABLET: 8.6; 5 TABLET ORAL at 05:07

## 2018-10-18 RX ADMIN — ATORVASTATIN CALCIUM 40 MG: 40 TABLET, FILM COATED ORAL at 17:39

## 2018-10-18 RX ADMIN — IPRATROPIUM BROMIDE AND ALBUTEROL SULFATE 3 ML: .5; 3 SOLUTION RESPIRATORY (INHALATION) at 02:17

## 2018-10-18 RX ADMIN — ALPRAZOLAM 1 MG: 1 TABLET ORAL at 08:18

## 2018-10-18 RX ADMIN — GUAIFENESIN 600 MG: 600 TABLET, EXTENDED RELEASE ORAL at 05:08

## 2018-10-18 RX ADMIN — CLOTRIMAZOLE 10 MG: 10 LOZENGE ORAL; TOPICAL at 11:07

## 2018-10-18 RX ADMIN — OXYCODONE HYDROCHLORIDE 5 MG: 5 TABLET ORAL at 11:07

## 2018-10-18 RX ADMIN — OMEPRAZOLE 20 MG: 20 CAPSULE, DELAYED RELEASE ORAL at 05:07

## 2018-10-18 ASSESSMENT — ENCOUNTER SYMPTOMS
BLURRED VISION: 1
HEADACHES: 0
MUSCULOSKELETAL NEGATIVE: 1
VOMITING: 0
NECK PAIN: 0
BRUISES/BLEEDS EASILY: 0
SHORTNESS OF BREATH: 1
FOCAL WEAKNESS: 0
WEAKNESS: 1
PALPITATIONS: 0
COUGH: 1
NERVOUS/ANXIOUS: 1
DIZZINESS: 0
SPUTUM PRODUCTION: 1
POLYDIPSIA: 0
CHILLS: 1
DEPRESSION: 0
GASTROINTESTINAL NEGATIVE: 1
FEVER: 1
HEARTBURN: 0
BACK PAIN: 0
NAUSEA: 0
CARDIOVASCULAR NEGATIVE: 1

## 2018-10-18 ASSESSMENT — PAIN SCALES - GENERAL
PAINLEVEL_OUTOF10: 4
PAINLEVEL_OUTOF10: 0
PAINLEVEL_OUTOF10: 0
PAINLEVEL_OUTOF10: 1
PAINLEVEL_OUTOF10: 0
PAINLEVEL_OUTOF10: 8
PAINLEVEL_OUTOF10: 1
PAINLEVEL_OUTOF10: 8
PAINLEVEL_OUTOF10: 7
PAINLEVEL_OUTOF10: 8

## 2018-10-18 ASSESSMENT — COGNITIVE AND FUNCTIONAL STATUS - GENERAL
DAILY ACTIVITIY SCORE: 24
MOBILITY SCORE: 24
SUGGESTED CMS G CODE MODIFIER DAILY ACTIVITY: CH
SUGGESTED CMS G CODE MODIFIER MOBILITY: CH

## 2018-10-18 ASSESSMENT — PATIENT HEALTH QUESTIONNAIRE - PHQ9
1. LITTLE INTEREST OR PLEASURE IN DOING THINGS: NOT AT ALL
2. FEELING DOWN, DEPRESSED, IRRITABLE, OR HOPELESS: NOT AT ALL
SUM OF ALL RESPONSES TO PHQ9 QUESTIONS 1 AND 2: 0

## 2018-10-18 ASSESSMENT — COPD QUESTIONNAIRES
DO YOU EVER COUGH UP ANY MUCUS OR PHLEGM?: YES, A FEW DAYS A WEEK OR MONTH
COPD SCREENING SCORE: 5
DURING THE PAST 4 WEEKS HOW MUCH DID YOU FEEL SHORT OF BREATH: SOME OF THE TIME
HAVE YOU SMOKED AT LEAST 100 CIGARETTES IN YOUR ENTIRE LIFE: YES

## 2018-10-18 ASSESSMENT — LIFESTYLE VARIABLES: EVER_SMOKED: YES

## 2018-10-18 NOTE — PROGRESS NOTES
12hr chart check.  2 RN skin assessment completed.     Monitor Summary:  /ST  70-100s  .20/.08/.34

## 2018-10-18 NOTE — PROGRESS NOTES
Critical Care Progress Note    Date of admission  10/16/2018    Chief Complaint  43 y.o. female admitted 10/16/2018 with sepsis, lactic acidosis, elevated troponin and respiratory failure requiring BiPAP in the ED    Hospital Course    43 y.o. female who presented 10/16/2018 past medical history of COPD and asthma.  She also has a history of  hypothyroid with Graves' disease.  She was following with a local endocrinologist, however they would not prescribe her steroids, and she saw the endocrinologist in [place], started on steroids 1 month ago, 40 mg daily.  She is not on any Bactrim prophylactic dosing.  She does not have a cough.  She feels that her chest is tight, she has had difficulty breathing for 3 days, she thought it was an asthma exacerbation and took her inhalers, she is supposed to be on Brio Ellipta and Spiriva at home, she still smokes cigarettes, she did not get better and today felt like she could not breathe and her  brought her to the hospital.  She denies any history of cardiac disease.  Her chest x-ray shows diffuse infiltrates, suspicious for either volume overload or potentially Arntson and an atypical infection.  She has not had any sick contacts recently.  She does not currently have chest pain.  She continues to wheeze.  In the ER she was hypoxic on nasal cannula and oxygen mask, transitioned to BiPAP and remains saturating 90-91% on 80% FiO2, BiPAP settings 12/8.  She currently feels better.  She denies any abdominal pain, dysuria, polyuria, headaches, acute motor or sensory changes, or lower extremity swelling.  She had a blood clot when she was younger secondary to a leg injury.  She is not currently on any anticoagulation.  Initially she was hypotensive in the emergency room down to 70 systolic, and received IV fluid resuscitation.  Other medical past medical history includes anxiety, depression, asthma, beta thalassemia, chronic pain, and has a history of opiate use and  seizures.  She says she is not currently on any narcotics and she does not use IV or recreational drugs other than occasional marijuana.  She does not drink alcohol.      Interval Problem Update  Reviewed last 24 hour events:  A/o, NFE  SR/ST, SBP 90's  Afebrile  beryl diet  UOP, I/O 2.5/1.5  8-9 LPM oxy mask  Levaquin day 3  Sputum, pcp, ldh p today    Yesterday:  Afebrile  Good uop  12 - 15L NC, off bipap 11 pm, remains off  Pred 60 at home for Graves' disease  Off NSAIDs  PPI  Day 2 Levaquin for ? CAP  HH down  Replacing K    Review of Systems  Review of Systems   Unable to perform ROS: Acuity of condition        Vital Signs for last 24 hours   Temp:  [37.2 °C (99 °F)-37.5 °C (99.5 °F)] 37.4 °C (99.3 °F)  Pulse:  [] 83  Resp:  [13-43] 16    Hemodynamic parameters for last 24 hours       Vent Settings for last 24 hours       Physical Exam   Physical Exam   Constitutional: She appears well-developed.   HENT:   Head: Normocephalic and atraumatic.   Eyes: Pupils are equal, round, and reactive to light.   Neck: Neck supple. No tracheal deviation present.   Cardiovascular: Normal rate.    No murmur heard.  Pulmonary/Chest: She has rales.   Diminished throughout, scattered coarse crackles,   Abdominal: Soft. She exhibits no distension. There is no tenderness.   Musculoskeletal: She exhibits no edema.   Neurological: She is alert. No cranial nerve deficit. Coordination normal.   Skin: Skin is warm and dry.       Medications  Current Facility-Administered Medications   Medication Dose Route Frequency Provider Last Rate Last Dose   • potassium chloride SA (Kdur) tablet 40 mEq  40 mEq Oral Once Jos Sepulveda M.D.       • magnesium sulfate IVPB premix 2 g  2 g Intravenous Once Jos Sepulveda M.D.       • IPRATROPIUM-ALBUTEROL 0.5-2.5 (3) MG/3ML INH SOLN            • ipratropium-albuterol (DUONEB) nebulizer solution  3 mL Nebulization 4X/DAY Yunior Villalobos M.D.       • predniSONE (DELTASONE) tablet 60 mg  60 mg Oral DAILY  Rodolfo Stinson M.D.   60 mg at 10/18/18 0507   • guaiFENesin LA (MUCINEX) tablet 600 mg  600 mg Oral Q12HRS Rodolfo Stinson M.D.   600 mg at 10/18/18 0508   • omeprazole (PRILOSEC) capsule 20 mg  20 mg Oral BID Rodolfo Stinson M.D.   20 mg at 10/18/18 0507   • oxyCODONE immediate-release (ROXICODONE) tablet 5 mg  5 mg Oral Q4HRS PRN Rodolfo Stinson M.D.   5 mg at 10/18/18 0448   • nicotine (NICODERM) 21 MG/24HR 21 mg  21 mg Transdermal Daily-0600 Rodolfo Stinson M.D.        And   • nicotine polacrilex (NICORETTE) 2 MG piece 2 mg  2 mg Oral Q HOUR PRN Rodolfo Stinson M.D.       • clotrimazole (MYCELEX) tablet 10 mg  10 mg Oral 5X/DAY Rodolfo Stinson M.D.   10 mg at 10/18/18 0508   • ALPRAZolam (XANAX) tablet 1 mg  1 mg Oral TID PRN Yunior Villalobos M.D.   1 mg at 10/17/18 2311   • gabapentin (NEURONTIN) capsule 600 mg  600 mg Oral TID Yunior Villalobos M.D.   600 mg at 10/18/18 0507   • levothyroxine (SYNTHROID) tablet 62.5 mcg  62.5 mcg Oral DAILY Yunior Villalobos M.D.   62.5 mcg at 10/18/18 0506   • traZODone (DESYREL) tablet 100 mg  100 mg Oral HS PRN Yunior Villalobos M.D.       • Respiratory Care per Protocol   Nebulization Continuous RT Yunior Villalobos M.D.       • senna-docusate (PERICOLACE or SENOKOT S) 8.6-50 MG per tablet 2 Tab  2 Tab Oral BID Yunior Villalobos M.D.   2 Tab at 10/18/18 0507    And   • polyethylene glycol/lytes (MIRALAX) PACKET 1 Packet  1 Packet Oral QDAY PRN Yunior Villalobos M.D.        And   • magnesium hydroxide (MILK OF MAGNESIA) suspension 30 mL  30 mL Oral QDAY PRN Yunior Villalobos M.D.        And   • bisacodyl (DULCOLAX) suppository 10 mg  10 mg Rectal QDAY PRN Yunior Villalobos M.D.       • acetaminophen (TYLENOL) tablet 650 mg  650 mg Oral Q6HRS PRN Yunior Villalobos M.D.       • levoFLOXacin in D5W (LEVAQUIN) IVPB 750 mg  750 mg Intravenous Q24HRS Yunior Villalobos M.D.   Stopped at 10/18/18 0636   • ondansetron (ZOFRAN) syringe/vial injection 4 mg  4 mg Intravenous Q4HRS PRN Yunior Villalobos M.D.       •  ondansetron (ZOFRAN ODT) dispertab 4 mg  4 mg Oral Q4HRS PRN Yunior Villalobos M.D.       • promethazine (PHENERGAN) tablet 12.5-25 mg  12.5-25 mg Oral Q4HRS PRN Yunior Villalobos M.D.       • promethazine (PHENERGAN) suppository 12.5-25 mg  12.5-25 mg Rectal Q4HRS PRN Yunior Villalobos M.D.       • prochlorperazine (COMPAZINE) injection 5-10 mg  5-10 mg Intravenous Q4HRS PRN Yunior Villalobos M.D.       • MD Alert...ICU Electrolyte Replacement per Pharmacy   Other pharmacy to dose Kei Curiel M.D.       • enoxaparin (LOVENOX) inj 40 mg  40 mg Subcutaneous DAILY Kei Curiel M.D.   40 mg at 10/18/18 0507   • aspirin (ASA) tablet 325 mg  325 mg Oral DAILY Yunior Villalobos M.D.   325 mg at 10/18/18 0507   • atorvastatin (LIPITOR) tablet 40 mg  40 mg Oral Q EVENING Yunior Villalobos M.D.   40 mg at 10/17/18 1803       Fluids    Intake/Output Summary (Last 24 hours) at 10/18/18 0812  Last data filed at 10/18/18 0600   Gross per 24 hour   Intake             2300 ml   Output             1550 ml   Net              750 ml       Laboratory  Recent Results (from the past 48 hour(s))   CBC WITH DIFFERENTIAL    Collection Time: 10/16/18  8:30 AM   Result Value Ref Range    WBC 15.6 (H) 4.8 - 10.8 K/uL    RBC 5.26 4.20 - 5.40 M/uL    Hemoglobin 11.2 (L) 12.0 - 16.0 g/dL    Hematocrit 34.7 (L) 37.0 - 47.0 %    MCV 66.0 (L) 81.4 - 97.8 fL    MCH 21.3 (L) 27.0 - 33.0 pg    MCHC 32.3 (L) 33.6 - 35.0 g/dL    RDW 38.8 35.9 - 50.0 fL    Platelet Count 150 (L) 164 - 446 K/uL    MPV 9.8 9.0 - 12.9 fL    Neutrophils-Polys 90.10 (H) 44.00 - 72.00 %    Lymphocytes 7.30 (L) 22.00 - 41.00 %    Monocytes 1.40 0.00 - 13.40 %    Eosinophils 0.50 0.00 - 6.90 %    Basophils 0.10 0.00 - 1.80 %    Immature Granulocytes 0.60 0.00 - 0.90 %    Nucleated RBC 0.00 /100 WBC    Neutrophils (Absolute) 14.02 (H) 2.00 - 7.15 K/uL    Lymphs (Absolute) 1.13 1.00 - 4.80 K/uL    Monos (Absolute) 0.22 0.00 - 0.85 K/uL    Eos (Absolute) 0.08 0.00 - 0.51 K/uL    Baso (Absolute) 0.02 0.00 - 0.12  K/uL    Immature Granulocytes (abs) 0.09 0.00 - 0.11 K/uL    NRBC (Absolute) 0.00 K/uL   COMP METABOLIC PANEL    Collection Time: 10/16/18  8:30 AM   Result Value Ref Range    Sodium 139 135 - 145 mmol/L    Potassium 3.6 3.6 - 5.5 mmol/L    Chloride 107 96 - 112 mmol/L    Co2 22 20 - 33 mmol/L    Anion Gap 10.0 0.0 - 11.9    Glucose 121 (H) 65 - 99 mg/dL    Bun 18 8 - 22 mg/dL    Creatinine 0.64 0.50 - 1.40 mg/dL    Calcium 8.8 8.5 - 10.5 mg/dL    AST(SGOT) 29 12 - 45 U/L    ALT(SGPT) 8 2 - 50 U/L    Alkaline Phosphatase 72 30 - 99 U/L    Total Bilirubin 1.5 0.1 - 1.5 mg/dL    Albumin 3.8 3.2 - 4.9 g/dL    Total Protein 6.3 6.0 - 8.2 g/dL    Globulin 2.5 1.9 - 3.5 g/dL    A-G Ratio 1.5 g/dL   LIPASE    Collection Time: 10/16/18  8:30 AM   Result Value Ref Range    Lipase 3 (L) 11 - 82 U/L   TROPONIN    Collection Time: 10/16/18  8:30 AM   Result Value Ref Range    Troponin I 2.78 (H) 0.00 - 0.04 ng/mL   BTYPE NATRIURETIC PEPTIDE    Collection Time: 10/16/18  8:30 AM   Result Value Ref Range    B Natriuretic Peptide 96 0 - 100 pg/mL   LACTIC ACID    Collection Time: 10/16/18  8:30 AM   Result Value Ref Range    Lactic Acid 1.7 0.5 - 2.0 mmol/L   TSH    Collection Time: 10/16/18  8:30 AM   Result Value Ref Range    TSH 0.130 (L) 0.380 - 5.330 uIU/mL   FREE THYROXINE    Collection Time: 10/16/18  8:30 AM   Result Value Ref Range    Free T-4 1.06 0.53 - 1.43 ng/dL   ESTIMATED GFR    Collection Time: 10/16/18  8:30 AM   Result Value Ref Range    GFR If African American >60 >60 mL/min/1.73 m 2    GFR If Non African American >60 >60 mL/min/1.73 m 2   BLOOD CULTURE    Collection Time: 10/16/18  8:50 AM   Result Value Ref Range    Significant Indicator NEG     Source BLD     Site PERIPHERAL     Blood Culture       No Growth    Note: Blood cultures are incubated for 5 days and  are monitored continuously.Positive blood cultures  are called to the RN and reported as soon as  they are identified.     EKG (NOW)    Collection  Time: 10/16/18  8:57 AM   Result Value Ref Range    Report       [facility] Emergency Dept.    Test Date:  2018-10-16  Pt Name:    DENA CASILLAS              Department: ER  MRN:        [id number]                      Room:       RD 11  Gender:     Female                       Technician: [id number]  :        [id number]                   Requested By:LAURITA PINK  Order #:    [id number]                    Reading MD: LAURITA PINK MD    Measurements  Intervals                                Axis  Rate:       103                          P:          46  KS:         124                          QRS:        70  QRSD:       94                           T:          31  QT:         360  QTc:        471    Interpretive Statements  SINUS TACHYCARDIA  PROBABLE LEFT ATRIAL ABNORMALITY  RSR' IN V1 OR V2, RIGHT VCD OR RVH  Compared to ECG 2018 15:12:20  Right ventricular hypertrophy now present    Electronically Signed On 10- 9:08:24 PDT by LAURITA PINK MD     BLOOD CULTURE    Collection Time: 10/16/18  9:15 AM   Result Value Ref Range    Significant Indicator NEG     Source BLD     Site PERIPHERAL     Blood Culture       No Growth    Note: Blood cultures are incubated for 5 days and  are monitored continuously.Positive blood cultures  are called to the RN and reported as soon as  they are identified.     URINALYSIS,CULTURE IF INDICATED    Collection Time: 10/16/18 11:10 AM   Result Value Ref Range    Color DK Yellow     Character Cloudy (A)     Specific Gravity 1.029 <1.035    Ph 6.0 5.0 - 8.0    Glucose Negative Negative mg/dL    Ketones 15 (A) Negative mg/dL    Protein 100 (A) Negative mg/dL    Bilirubin Negative Negative    Urobilinogen, Urine 1.0 Negative    Nitrite Negative Negative    Leukocyte Esterase Negative Negative    Occult Blood Negative Negative    Micro Urine Req Microscopic    URINE MICROSCOPIC (W/UA)    Collection Time: 10/16/18 11:10 AM   Result Value Ref Range     WBC 2-5 /hpf    RBC 2-5 (A) /hpf    Bacteria Moderate (A) None /hpf    Epithelial Cells Many (A) /hpf    Mucous Threads Moderate /hpf   ISTAT ARTERIAL BLOOD GAS    Collection Time: 10/16/18 12:00 PM   Result Value Ref Range    Ph 7.435 7.400 - 7.500    Pco2 34.1 26.0 - 37.0 mmHg    Po2 75 64 - 87 mmHg    Tco2 24 20 - 33 mmol/L    S02 95 93 - 99 %    Hco3 22.9 17.0 - 25.0 mmol/L    BE -1 -4 - 3 mmol/L    Body Temp see below degrees    O2 Therapy 80 %    iPF Ratio 94     Specimen Arterial     Action Range Triggered NO     Inst. Qualified Patient YES    EC-ECHOCARDIOGRAM COMPLETE W/O CONT    Collection Time: 10/16/18 12:59 PM   Result Value Ref Range    Eject.Frac. MOD 4C 56.42     Left Ventrical Ejection Fraction 55    EKG    Collection Time: 10/16/18  2:06 PM   Result Value Ref Range    Report       [facility]    Test Date:  2018-10-16  Pt Name:    DENA CASILLAS              Department:   MRN:        [id number]                      Room:       [facility]  Gender:     Female                       Technician: [facility]  :        1975                   Requested By:HUNTER UMANZOR  Order #:    [id number]                    Reading MD: Alex Jorge MD    Measurements  Intervals                                Axis  Rate:       68                           P:          24  NJ:         136                          QRS:        45  QRSD:       90                           T:          25  QT:         396  QTc:        422    Interpretive Statements  SINUS RHYTHM      Electronically Signed On 10- 16:29:44 PDT by Alex Jorge MD     INFLUENZA A/B BY PCR    Collection Time: 10/16/18  2:15 PM   Result Value Ref Range    Influenza virus A RNA Negative Negative    Influenza virus B, PCR Negative Negative   PEDIATRIC RESPIRATORY PANEL BY PCR    Collection Time: 10/16/18  2:15 PM   Result Value Ref Range    Adenovirus, PCR Not Detected     Coronavirus, PCR Not Detected     Human Metapneumovirus, PCR Not Detected      Rhinovirus / Enterovirus, PCR Not Detected     Influenza virus A RNA Not Detected     Influenza virus A H1 RNA Not Detected     Influenza A 2009, H1N1, PCR Not Detected     Influenza virus A H3 RNA Not Detected     Influenza virus B, PCR Not Detected     Parainfluenza virus 1, PCR Not Detected     Parainfluenza virus 2, PCR Not Detected     Parainfluenza virus 3, PCR Not Detected     Parainfluenza 4, PCR Not Detected     Resp Syncytial Virus A, PCR Not Detected     Resp Syncytial Virus B, PCR Not Detected     Chlamydia pneumoniae, PCR Not Detected     Mycoplasma pneumoniae, PCR Not Detected    TROPONIN    Collection Time: 10/16/18  6:34 PM   Result Value Ref Range    Troponin I 1.95 (H) 0.00 - 0.04 ng/mL   BASIC METABOLIC PANEL    Collection Time: 10/16/18  6:34 PM   Result Value Ref Range    Sodium 137 135 - 145 mmol/L    Potassium 3.6 3.6 - 5.5 mmol/L    Chloride 108 96 - 112 mmol/L    Co2 25 20 - 33 mmol/L    Glucose 134 (H) 65 - 99 mg/dL    Bun 13 8 - 22 mg/dL    Creatinine 0.64 0.50 - 1.40 mg/dL    Calcium 7.9 (L) 8.5 - 10.5 mg/dL    Anion Gap 4.0 0.0 - 11.9   ESTIMATED GFR    Collection Time: 10/16/18  6:34 PM   Result Value Ref Range    GFR If African American >60 >60 mL/min/1.73 m 2    GFR If Non African American >60 >60 mL/min/1.73 m 2   CBC with Differential    Collection Time: 10/17/18  4:15 AM   Result Value Ref Range    WBC 8.9 4.8 - 10.8 K/uL    RBC 4.04 (L) 4.20 - 5.40 M/uL    Hemoglobin 8.4 (L) 12.0 - 16.0 g/dL    Hematocrit 26.7 (L) 37.0 - 47.0 %    MCV 65.3 (L) 81.4 - 97.8 fL    MCH 20.8 (L) 27.0 - 33.0 pg    MCHC 31.8 (L) 33.6 - 35.0 g/dL    RDW 39.4 35.9 - 50.0 fL    Platelet Count 116 (L) 164 - 446 K/uL    MPV 9.7 9.0 - 12.9 fL    Neutrophils-Polys 92.70 (H) 44.00 - 72.00 %    Lymphocytes 5.40 (L) 22.00 - 41.00 %    Monocytes 1.50 0.00 - 13.40 %    Eosinophils 0.00 0.00 - 6.90 %    Basophils 0.00 0.00 - 1.80 %    Immature Granulocytes 0.40 0.00 - 0.90 %    Nucleated RBC 0.00 /100 WBC     Neutrophils (Absolute) 8.25 (H) 2.00 - 7.15 K/uL    Lymphs (Absolute) 0.48 (L) 1.00 - 4.80 K/uL    Monos (Absolute) 0.13 0.00 - 0.85 K/uL    Eos (Absolute) 0.00 0.00 - 0.51 K/uL    Baso (Absolute) 0.00 0.00 - 0.12 K/uL    Immature Granulocytes (abs) 0.04 0.00 - 0.11 K/uL    NRBC (Absolute) 0.00 K/uL   Basic Metabolic Panel (BMP)    Collection Time: 10/17/18  4:15 AM   Result Value Ref Range    Sodium 140 135 - 145 mmol/L    Potassium 3.5 (L) 3.6 - 5.5 mmol/L    Chloride 105 96 - 112 mmol/L    Co2 27 20 - 33 mmol/L    Glucose 166 (H) 65 - 99 mg/dL    Bun 13 8 - 22 mg/dL    Creatinine 0.58 0.50 - 1.40 mg/dL    Calcium 8.5 8.5 - 10.5 mg/dL    Anion Gap 8.0 0.0 - 11.9   Magnesium    Collection Time: 10/17/18  4:15 AM   Result Value Ref Range    Magnesium 2.0 1.5 - 2.5 mg/dL   Phosphorus    Collection Time: 10/17/18  4:15 AM   Result Value Ref Range    Phosphorus 3.1 2.5 - 4.5 mg/dL   ESTIMATED GFR    Collection Time: 10/17/18  4:15 AM   Result Value Ref Range    GFR If African American >60 >60 mL/min/1.73 m 2    GFR If Non African American >60 >60 mL/min/1.73 m 2   FERRITIN    Collection Time: 10/17/18 11:47 AM   Result Value Ref Range    Ferritin 150.3 10.0 - 291.0 ng/mL   IRON/TOTAL IRON BIND    Collection Time: 10/17/18 11:47 AM   Result Value Ref Range    Iron 45 40 - 170 ug/dL    Total Iron Binding 300 250 - 450 ug/dL    % Saturation 15 15 - 55 %   PROCALCITONIN    Collection Time: 10/17/18 11:47 AM   Result Value Ref Range    Procalcitonin 0.09 <0.25 ng/mL   CBC WITHOUT DIFFERENTIAL    Collection Time: 10/18/18  3:15 AM   Result Value Ref Range    WBC 9.1 4.8 - 10.8 K/uL    RBC 3.98 (L) 4.20 - 5.40 M/uL    Hemoglobin 8.2 (L) 12.0 - 16.0 g/dL    Hematocrit 26.5 (L) 37.0 - 47.0 %    MCV 66.6 (L) 81.4 - 97.8 fL    MCH 20.6 (L) 27.0 - 33.0 pg    MCHC 30.9 (L) 33.6 - 35.0 g/dL    RDW 39.5 35.9 - 50.0 fL    Platelet Count 121 (L) 164 - 446 K/uL    MPV 9.7 9.0 - 12.9 fL   COMP METABOLIC PANEL    Collection Time: 10/18/18   3:15 AM   Result Value Ref Range    Sodium 141 135 - 145 mmol/L    Potassium 3.7 3.6 - 5.5 mmol/L    Chloride 105 96 - 112 mmol/L    Co2 28 20 - 33 mmol/L    Anion Gap 8.0 0.0 - 11.9    Glucose 124 (H) 65 - 99 mg/dL    Bun 12 8 - 22 mg/dL    Creatinine 0.57 0.50 - 1.40 mg/dL    Calcium 8.6 8.5 - 10.5 mg/dL    AST(SGOT) 17 12 - 45 U/L    ALT(SGPT) 13 2 - 50 U/L    Alkaline Phosphatase 75 30 - 99 U/L    Total Bilirubin 0.6 0.1 - 1.5 mg/dL    Albumin 3.4 3.2 - 4.9 g/dL    Total Protein 5.4 (L) 6.0 - 8.2 g/dL    Globulin 2.0 1.9 - 3.5 g/dL    A-G Ratio 1.7 g/dL   MAGNESIUM    Collection Time: 10/18/18  3:15 AM   Result Value Ref Range    Magnesium 1.9 1.5 - 2.5 mg/dL   PHOSPHORUS    Collection Time: 10/18/18  3:15 AM   Result Value Ref Range    Phosphorus 2.3 (L) 2.5 - 4.5 mg/dL   ESTIMATED GFR    Collection Time: 10/18/18  3:15 AM   Result Value Ref Range    GFR If African American >60 >60 mL/min/1.73 m 2    GFR If Non African American >60 >60 mL/min/1.73 m 2       Imaging  X-Ray:  I have personally reviewed the images and compared with prior images.    Assessment/Plan  Severe sepsis (HCC)- (present on admission)   Assessment & Plan    Patient meets septic criteria.   Likely pulmonary infection.    She is severely hypoxic.   She has been on 1 month of steroids at high doses 40 mg daily.    She is not on prophylaxis for pcp.   Does not create sputum.  If intubated then bronchoscopy with BAL recommended.  On Levaquin.  Depending on response to Lasix, may consider Bactrim.  PCT low for what it is worth  Follow-up LDH and induce sputum for culture and PCP DFA  Continue empiric levofloxacin and follow chest imaging        Shock (HCC)   Assessment & Plan    This is likely septic shock, improved  Follow-up echocardiogram,        Elevated troponin- (present on admission)   Assessment & Plan    This is likely demand ischemia, secondary to hypoxic respiratory failure.  Patient does not have any chest pain.  Echocardiogram  pending  Cardiology following, meds reviewed -aspirin/statin, holding beta blocker ACE due to hypotension          Leukocytosis- (present on admission)   Assessment & Plan    Patient has sepsis, likely pneumonia.  Krista  See above for sepsis        Acute respiratory failure with hypoxia (HCC)- (present on admission)   Assessment & Plan    Per patient's history likely severe COPD, asthma exacerbation  Likely hypoxic respiratory failure stressed heart causing congestive heart failure, type II MI  Her BNP is normal.  Troponin elevation.  Echo pending today  Per bedside ultrasound seems hypervolemic, this may also represent arts or other atypical infection but less likely  Trial of Lasix 20 mg once, if blood pressure tolerates then repeat.  Currently on BiPAP on/off as needed  Solu-Medrol with transition to oral prednisone  RT O2 protocol  Scheduled bronchodilators  Hold home inhalers as patient unlikely be able to use at this time  This point she looks like she will avoid need for emergent intubation but will be followed very closely in the intensive care unit               VTE:  Lovenox  Ulcer: PPI  Lines: None    I have performed a physical exam and reviewed and updated ROS and Plan today (10/18/2018). In review of yesterday's note (10/17/2018), there are no changes except as documented above.   High FiO2, risk for intubation; ongoing cc in ICU  Discussed patient condition and risk of morbidity and/or mortality with Hospitalist, RN, RT and QA team  The patient remains critically ill.  Critical care time = 31 minutes in directly providing and coordinating critical care and extensive data review.  No time overlap and excludes procedures.

## 2018-10-18 NOTE — FLOWSHEET NOTE
10/18/18 1603   Interdisciplinary Plan of Care-Goals (Indications)   Bronchodilator Indications History / Diagnosis   Interdisciplinary Plan of Care-Outcomes    Bronchodilator Outcome Patient at Stable Baseline   SVN Group   #SVN Performed Yes   Given By: Mouthpiece   Respiratory WDL   Respiratory (WDL) X   Chest Exam   Respiration (!) 22   Pulse 84   Heart Rate (Monitored) 85   Breath Sounds   Pre/Post Intervention Pre Intervention Assessment   RUL Breath Sounds Clear   RML Breath Sounds Clear   RLL Breath Sounds Diminished   BETH Breath Sounds Clear   LLL Breath Sounds Diminished   Secretions   Cough Congested;Strong   Oximetry   Continuous Oximetry Yes   O2 Alarms Set & Reviewed Yes   Oxygen   Pulse Oximetry 92 %   O2 (LPM) 8   O2 Daily Delivery Respiratory  OxyMask

## 2018-10-18 NOTE — CARE PLAN
Problem: Venous Thromboembolism (VTW)/Deep Vein Thrombosis (DVT) Prevention:  Goal: Patient will participate in Venous Thrombosis (VTE)/Deep Vein Thrombosis (DVT)Prevention Measures  Outcome: PROGRESSING AS EXPECTED   10/17/18 2000   Mechanical/VTE Prophylaxis   Mechanical Prophylaxis  SCDs, Sequential Compression Device   SCDs, Sequential Compression Device Refused   OTHER   Risk Assessment Score 1   VTE RISK Moderate   Pharmacologic Prophylaxis Used LMWH: Enoxaparin(Lovenox)       Problem: Pain Management  Goal: Pain level will decrease to patient's comfort goal  Outcome: PROGRESSING AS EXPECTED  C/o lower back and bilateral flank pain. Verbalizes pain using 0-10 scale. New orders in place for pain medication. Medicated as needed. Non-pharmacologic options discussed and offered. Activity as tolerated.    Problem: Respiratory:  Goal: Respiratory status will improve  Outcome: PROGRESSING SLOWER THAN EXPECTED  Off bipap since 0300 today. Still requires increased O2. SOB with exertion. Currently on 10L via NC. wean as tolerated. RT following.

## 2018-10-18 NOTE — PROGRESS NOTES
[facility] Hospitalist Progress Note    Date of Service: 10/18/2018    Chief Complaint  43 y.o. female admitted 10/16/2018 with a history of COPD, tobacco abuse, question of asthma, presenting with shortness of breath, cough, chills, diagnosed with community-acquired pneumonia and respiratory failure, placed on BiPAP in the ER    Interval Problem Update  Patient seen and examined today. ICU Care  Care and plan discussed in IDT/Hot rounds.  Lines and assistive devices reviewed.    Patient tolerating treatment and therapies.  All Data, Medication data reviewed.  Case discussed with nursing as available.  Plan of Care reviewed with patient and notified of changes.    10/17 the patient is improved, came off BiPAP around 11:00 PM, no further increase in respiratory complaints, wet cough, patient wants to eat, manifestation of Graves' disease, she denies any other sick contact or recent travel out of state  10/18 the patient feels better, she wants her Bird discontinued, she ambulated some, requires 8-9 L per oxygen mask, improved cough, improved chest exam  Consultants/Specialty  Pulmonary    Disposition  TBD        Review of Systems   Constitutional: Positive for chills, fever and malaise/fatigue.   HENT: Negative.    Eyes: Positive for blurred vision.   Respiratory: Positive for cough, sputum production and shortness of breath.    Cardiovascular: Negative.  Negative for chest pain and palpitations.   Gastrointestinal: Negative.  Negative for heartburn, nausea and vomiting.   Genitourinary: Negative.  Negative for dysuria and frequency.   Musculoskeletal: Negative.  Negative for back pain and neck pain.   Skin: Negative.  Negative for itching and rash.   Neurological: Positive for weakness. Negative for dizziness, focal weakness and headaches.   Endo/Heme/Allergies: Negative.  Negative for polydipsia. Does not bruise/bleed easily.   Psychiatric/Behavioral: Negative for depression. The patient is nervous/anxious.        Physical Exam  Laboratory/Imaging   Hemodynamics  Temp (24hrs), Av.3 °C (99.2 °F), Min:37.2 °C (99 °F), Max:37.5 °C (99.5 °F)   Temperature: 37.4 °C (99.3 °F)  Pulse  Av.4  Min: 57  Max: 124 Heart Rate (Monitored): 83  NIBP: (!) 93/43      Respiratory      Respiration: 16, Pulse Oximetry: 93 %, O2 Daily Delivery Respiratory : OxyMask     Given By:: Mouthpiece, Work Of Breathing / Effort: Mild  RUL Breath Sounds: Fine Crackles, RML Breath Sounds: Fine Crackles, RLL Breath Sounds: Fine Crackles, BETH Breath Sounds: Fine Crackles, LLL Breath Sounds: Fine Crackles    Fluids    Intake/Output Summary (Last 24 hours) at 10/18/18 0755  Last data filed at 10/18/18 0600   Gross per 24 hour   Intake             2550 ml   Output             1565 ml   Net              985 ml       Nutrition  Orders Placed This Encounter   Procedures   • Diet Order Regular     Standing Status:   Standing     Number of Occurrences:   1     Order Specific Question:   Diet:     Answer:   Regular [1]     Physical Exam   Constitutional: She is oriented to person, place, and time. She appears well-developed and well-nourished.   HENT:   Head: Normocephalic and atraumatic.   Nose: Nose normal.   Mouth/Throat: Oropharynx is clear and moist.   Eyes: Pupils are equal, round, and reactive to light. Conjunctivae and EOM are normal.   Bilateral proptosis   Neck: Normal range of motion. Neck supple. No JVD present. No thyromegaly present.   Cardiovascular: Normal rate, regular rhythm and normal heart sounds.  Exam reveals no gallop and no friction rub.    Pulses:       Dorsalis pedis pulses are 2+ on the right side, and 2+ on the left side.   Capillary refill <3 secs   Pulmonary/Chest: Effort normal. She has decreased breath sounds. She has no wheezes. She has rhonchi. She has rales.   Abdominal: Soft. Bowel sounds are normal. She exhibits no distension and no mass. There is no tenderness. There is no rebound and no guarding.   Musculoskeletal:  Normal range of motion. She exhibits no edema or tenderness.   Lymphadenopathy:     She has no cervical adenopathy.   Neurological: She is alert and oriented to person, place, and time. No cranial nerve deficit.   Skin: Skin is warm and dry. She is not diaphoretic. No cyanosis.   Psychiatric: She has a normal mood and affect. Her behavior is normal.   Nursing note and vitals reviewed.      Recent Labs      10/16/18   0830  10/17/18   0415  10/18/18   0315   WBC  15.6*  8.9  9.1   RBC  5.26  4.04*  3.98*   HEMOGLOBIN  11.2*  8.4*  8.2*   HEMATOCRIT  34.7*  26.7*  26.5*   MCV  66.0*  65.3*  66.6*   MCH  21.3*  20.8*  20.6*   MCHC  32.3*  31.8*  30.9*   RDW  38.8  39.4  39.5   PLATELETCT  150*  116*  121*   MPV  9.8  9.7  9.7     Recent Labs      10/16/18   1834  10/17/18   0415  10/18/18   0315   SODIUM  137  140  141   POTASSIUM  3.6  3.5*  3.7   CHLORIDE  108  105  105   CO2  25  27  28   GLUCOSE  134*  166*  124*   BUN  13  13  12   CREATININE  0.64  0.58  0.57   CALCIUM  7.9*  8.5  8.6         Recent Labs      10/16/18   0830   BNPBTYPENAT  96              Assessment/Plan     Severe sepsis (HCC)- (present on admission)   Assessment & Plan    This is severe sepsis with the following associated acute organ dysfunction(s): acute respiratory failure.   Related to asthma exacerbation/community-acquired pneumonia  Sepsis protocol  IV fluid  Follow culture  Antibiotic with IV levofloxacin(allergic to penicillin and cephalosporin)        Anemia   Assessment & Plan    With a history of reported thalassemia  Microcytosis  Question of GI bleed or iron deficiency        Tobacco abuse   Assessment & Plan    Nicotine replacement protocol        Graves disease   Assessment & Plan    With significant ophthalmic findings  Placed on high-dose steroids on outpatient basis        Elevated troponin- (present on admission)   Assessment & Plan    With chest pain  Likely related to demand ischemia?  Cardiology consulted  Check echo and  trend trop        Leukocytosis- (present on admission)   Assessment & Plan    Related to above        Acute respiratory failure with hypoxia (HCC)- (present on admission)   Assessment & Plan    Related to asthma/COPD exacerbation  Initially on BiPAP, weaned off  Intensivist/pulmonary consulted  Antibiotics  Steroids, the patient was on high-dose prednisone and outpatient basis  Aggressive breathing treatment, steroid, RT protocol          Quality-Core Measures   Reviewed items::  Radiology images reviewed, EKG reviewed, Labs reviewed and Medications reviewed  Bird catheter::  No Bird  DVT prophylaxis pharmacological::  Enoxaparin (Lovenox)  DVT prophylaxis - mechanical:  SCDs  Antibiotics:  Treating active infection/contamination beyond 24 hours perioperative coverage  Assessed for rehabilitation services:  Patient was assess for and/or received rehabilitation services during this hospitalization      Plan  Placed back on current dose of prednisone  Continue with antibiotics, additional infectious disease labs sent, patient immunocompromised  Oxygen weaning to continue  Await cultures, follow-up  Respiratory therapy  Evaluate for cause of additional anemia, occult blood is pending  Iron studies noted  PPI to avoid further gastric compromise  Explained plan of care  Patient has significant risk, complex  Critically ill, but with potential to downgraded

## 2018-10-18 NOTE — DISCHARGE PLANNING
Care Transition Team Assessment    10/18/18 Discharge planning assessment completed. Patient does not use DME nor has received home health care in the past. No needs identified at this time.    Information Source  Orientation : Oriented x 4  Information Given By: Patient  Who is responsible for making decisions for patient? : Patient         Elopement Risk  Legal Hold: No  Ambulatory or Self Mobile in Wheelchair: Yes  Disoriented: No  Psychiatric Symptoms: None  History of Wandering: No  Elopement this Admit: No  Vocalizing Wanting to Leave: No  Displays Behaviors, Body Language Wanting to Leave: No-Not at Risk for Elopement  Elopement Risk: Not at Risk for Elopement    Interdisciplinary Discharge Planning  Does Admitting Nurse Feel This Could be a Complex Discharge?: No  Primary Care Physician: Guanako Leavitt  Lives with - Patient's Self Care Capacity: Spouse  Support Systems: Spouse / Significant Other  Housing / Facility: 1 Story Apartment / Condo  Do You Take your Prescribed Medications Regularly: Yes  Able to Return to Previous ADL's: Yes  Mobility Issues: No  Prior Services: None  Assistance Needed: No    Discharge Preparedness  What is your plan after discharge?: Home with help  Difficulity with ADLs: None  Difficulity with IADLs: None         Finances  Financial Barriers to Discharge: No  Prescription Coverage: Yes    Vision / Hearing Impairment  Vision Impairment : No  Hearing Impairment : No              Domestic Abuse  Have you ever been the victim of abuse or violence?: Not Sure    Psychological Assessment  History of Substance Abuse: None         Anticipated Discharge Information  Anticipated discharge disposition: Home

## 2018-10-19 ENCOUNTER — APPOINTMENT (OUTPATIENT)
Dept: RADIOLOGY | Facility: MEDICAL CENTER | Age: 43
DRG: 871 | End: 2018-10-19
Attending: INTERNAL MEDICINE
Payer: MEDICAID

## 2018-10-19 LAB
ANION GAP SERPL CALC-SCNC: 5 MMOL/L (ref 0–11.9)
BASOPHILS # BLD AUTO: 0.2 % (ref 0–1.8)
BASOPHILS # BLD: 0.01 K/UL (ref 0–0.12)
BNP SERPL-MCNC: 80 PG/ML (ref 0–100)
BUN SERPL-MCNC: 10 MG/DL (ref 8–22)
CALCIUM SERPL-MCNC: 8.6 MG/DL (ref 8.5–10.5)
CHLORIDE SERPL-SCNC: 101 MMOL/L (ref 96–112)
CO2 SERPL-SCNC: 31 MMOL/L (ref 20–33)
CREAT SERPL-MCNC: 0.55 MG/DL (ref 0.5–1.4)
EOSINOPHIL # BLD AUTO: 0.07 K/UL (ref 0–0.51)
EOSINOPHIL NFR BLD: 1.4 % (ref 0–6.9)
ERYTHROCYTE [DISTWIDTH] IN BLOOD BY AUTOMATED COUNT: 38.5 FL (ref 35.9–50)
GLUCOSE SERPL-MCNC: 104 MG/DL (ref 65–99)
HCT VFR BLD AUTO: 26.8 % (ref 37–47)
HGB BLD-MCNC: 8.4 G/DL (ref 12–16)
IMM GRANULOCYTES # BLD AUTO: 0.03 K/UL (ref 0–0.11)
IMM GRANULOCYTES NFR BLD AUTO: 0.6 % (ref 0–0.9)
LYMPHOCYTES # BLD AUTO: 0.71 K/UL (ref 1–4.8)
LYMPHOCYTES NFR BLD: 13.9 % (ref 22–41)
MAGNESIUM SERPL-MCNC: 1.9 MG/DL (ref 1.5–2.5)
MCH RBC QN AUTO: 20.6 PG (ref 27–33)
MCHC RBC AUTO-ENTMCNC: 31.3 G/DL (ref 33.6–35)
MCV RBC AUTO: 65.8 FL (ref 81.4–97.8)
MONOCYTES # BLD AUTO: 0.14 K/UL (ref 0–0.85)
MONOCYTES NFR BLD AUTO: 2.7 % (ref 0–13.4)
NEUTROPHILS # BLD AUTO: 4.16 K/UL (ref 2–7.15)
NEUTROPHILS NFR BLD: 81.2 % (ref 44–72)
NRBC # BLD AUTO: 0.02 K/UL
NRBC BLD-RTO: 0.4 /100 WBC
PHOSPHATE SERPL-MCNC: 3.7 MG/DL (ref 2.5–4.5)
PLATELET # BLD AUTO: 133 K/UL (ref 164–446)
PMV BLD AUTO: 9.7 FL (ref 9–12.9)
POTASSIUM SERPL-SCNC: 4.2 MMOL/L (ref 3.6–5.5)
RBC # BLD AUTO: 4.07 M/UL (ref 4.2–5.4)
SODIUM SERPL-SCNC: 137 MMOL/L (ref 135–145)
TEST NAME 95000: NORMAL
WBC # BLD AUTO: 5.1 K/UL (ref 4.8–10.8)

## 2018-10-19 PROCEDURE — 80048 BASIC METABOLIC PNL TOTAL CA: CPT

## 2018-10-19 PROCEDURE — 700101 HCHG RX REV CODE 250: Performed by: INTERNAL MEDICINE

## 2018-10-19 PROCEDURE — 700102 HCHG RX REV CODE 250 W/ 637 OVERRIDE(OP): Performed by: INTERNAL MEDICINE

## 2018-10-19 PROCEDURE — A9270 NON-COVERED ITEM OR SERVICE: HCPCS | Performed by: INTERNAL MEDICINE

## 2018-10-19 PROCEDURE — 700102 HCHG RX REV CODE 250 W/ 637 OVERRIDE(OP): Performed by: HOSPITALIST

## 2018-10-19 PROCEDURE — 83880 ASSAY OF NATRIURETIC PEPTIDE: CPT

## 2018-10-19 PROCEDURE — 700105 HCHG RX REV CODE 258

## 2018-10-19 PROCEDURE — A9270 NON-COVERED ITEM OR SERVICE: HCPCS | Performed by: HOSPITALIST

## 2018-10-19 PROCEDURE — 99233 SBSQ HOSP IP/OBS HIGH 50: CPT | Performed by: HOSPITALIST

## 2018-10-19 PROCEDURE — 770022 HCHG ROOM/CARE - ICU (200)

## 2018-10-19 PROCEDURE — 700111 HCHG RX REV CODE 636 W/ 250 OVERRIDE (IP): Performed by: INTERNAL MEDICINE

## 2018-10-19 PROCEDURE — 99291 CRITICAL CARE FIRST HOUR: CPT | Performed by: INTERNAL MEDICINE

## 2018-10-19 PROCEDURE — 83735 ASSAY OF MAGNESIUM: CPT

## 2018-10-19 PROCEDURE — 94640 AIRWAY INHALATION TREATMENT: CPT

## 2018-10-19 PROCEDURE — 700111 HCHG RX REV CODE 636 W/ 250 OVERRIDE (IP): Performed by: HOSPITALIST

## 2018-10-19 PROCEDURE — 85025 COMPLETE CBC W/AUTO DIFF WBC: CPT

## 2018-10-19 PROCEDURE — 71045 X-RAY EXAM CHEST 1 VIEW: CPT

## 2018-10-19 PROCEDURE — 84100 ASSAY OF PHOSPHORUS: CPT

## 2018-10-19 RX ORDER — LEVOFLOXACIN 750 MG/1
750 TABLET, FILM COATED ORAL DAILY
Status: DISCONTINUED | OUTPATIENT
Start: 2018-10-19 | End: 2018-10-21 | Stop reason: HOSPADM

## 2018-10-19 RX ORDER — ECHINACEA PURPUREA EXTRACT 125 MG
2 TABLET ORAL
Status: DISCONTINUED | OUTPATIENT
Start: 2018-10-19 | End: 2018-10-21 | Stop reason: HOSPADM

## 2018-10-19 RX ORDER — MAGNESIUM SULFATE HEPTAHYDRATE 40 MG/ML
2 INJECTION, SOLUTION INTRAVENOUS ONCE
Status: COMPLETED | OUTPATIENT
Start: 2018-10-19 | End: 2018-10-19

## 2018-10-19 RX ORDER — SODIUM CHLORIDE 9 MG/ML
INJECTION, SOLUTION INTRAVENOUS
Status: COMPLETED
Start: 2018-10-19 | End: 2018-10-19

## 2018-10-19 RX ADMIN — CLOTRIMAZOLE 10 MG: 10 LOZENGE ORAL; TOPICAL at 07:00

## 2018-10-19 RX ADMIN — IPRATROPIUM BROMIDE AND ALBUTEROL SULFATE 3 ML: .5; 3 SOLUTION RESPIRATORY (INHALATION) at 19:46

## 2018-10-19 RX ADMIN — TRAZODONE HYDROCHLORIDE 100 MG: 50 TABLET ORAL at 20:05

## 2018-10-19 RX ADMIN — OXYCODONE HYDROCHLORIDE 5 MG: 5 TABLET ORAL at 00:00

## 2018-10-19 RX ADMIN — ALPRAZOLAM 1 MG: 1 TABLET ORAL at 11:31

## 2018-10-19 RX ADMIN — OMEPRAZOLE 20 MG: 20 CAPSULE, DELAYED RELEASE ORAL at 17:07

## 2018-10-19 RX ADMIN — SODIUM CHLORIDE 250 ML: 9 INJECTION, SOLUTION INTRAVENOUS at 08:16

## 2018-10-19 RX ADMIN — OXYCODONE HYDROCHLORIDE 5 MG: 5 TABLET ORAL at 15:50

## 2018-10-19 RX ADMIN — ATORVASTATIN CALCIUM 40 MG: 40 TABLET, FILM COATED ORAL at 17:07

## 2018-10-19 RX ADMIN — IPRATROPIUM BROMIDE AND ALBUTEROL SULFATE 3 ML: .5; 3 SOLUTION RESPIRATORY (INHALATION) at 11:12

## 2018-10-19 RX ADMIN — LEVOTHYROXINE SODIUM 62.5 MCG: 0.12 TABLET ORAL at 04:38

## 2018-10-19 RX ADMIN — GABAPENTIN 600 MG: 300 CAPSULE ORAL at 11:31

## 2018-10-19 RX ADMIN — IPRATROPIUM BROMIDE AND ALBUTEROL SULFATE 3 ML: .5; 3 SOLUTION RESPIRATORY (INHALATION) at 16:08

## 2018-10-19 RX ADMIN — IPRATROPIUM BROMIDE AND ALBUTEROL SULFATE 3 ML: .5; 3 SOLUTION RESPIRATORY (INHALATION) at 07:16

## 2018-10-19 RX ADMIN — GABAPENTIN 600 MG: 300 CAPSULE ORAL at 04:39

## 2018-10-19 RX ADMIN — GUAIFENESIN 600 MG: 600 TABLET, EXTENDED RELEASE ORAL at 04:38

## 2018-10-19 RX ADMIN — SENNOSIDES AND DOCUSATE SODIUM 2 TABLET: 8.6; 5 TABLET ORAL at 04:38

## 2018-10-19 RX ADMIN — CLOTRIMAZOLE 10 MG: 10 LOZENGE ORAL; TOPICAL at 19:50

## 2018-10-19 RX ADMIN — ALPRAZOLAM 1 MG: 1 TABLET ORAL at 20:05

## 2018-10-19 RX ADMIN — PREDNISONE 60 MG: 20 TABLET ORAL at 04:39

## 2018-10-19 RX ADMIN — OXYCODONE HYDROCHLORIDE 5 MG: 5 TABLET ORAL at 10:31

## 2018-10-19 RX ADMIN — CLOTRIMAZOLE 10 MG: 10 LOZENGE ORAL; TOPICAL at 22:30

## 2018-10-19 RX ADMIN — GABAPENTIN 600 MG: 300 CAPSULE ORAL at 17:07

## 2018-10-19 RX ADMIN — GUAIFENESIN 600 MG: 600 TABLET, EXTENDED RELEASE ORAL at 17:07

## 2018-10-19 RX ADMIN — LEVOFLOXACIN 750 MG: 750 TABLET, FILM COATED ORAL at 05:07

## 2018-10-19 RX ADMIN — OXYCODONE HYDROCHLORIDE 5 MG: 5 TABLET ORAL at 04:30

## 2018-10-19 RX ADMIN — OXYCODONE HYDROCHLORIDE 5 MG: 5 TABLET ORAL at 20:05

## 2018-10-19 RX ADMIN — ASPIRIN 325 MG: 325 TABLET, COATED ORAL at 04:38

## 2018-10-19 RX ADMIN — MAGNESIUM SULFATE IN WATER 2 G: 40 INJECTION, SOLUTION INTRAVENOUS at 08:16

## 2018-10-19 RX ADMIN — ALPRAZOLAM 1 MG: 1 TABLET ORAL at 04:41

## 2018-10-19 RX ADMIN — CLOTRIMAZOLE 10 MG: 10 LOZENGE ORAL; TOPICAL at 15:45

## 2018-10-19 RX ADMIN — CLOTRIMAZOLE 10 MG: 10 LOZENGE ORAL; TOPICAL at 11:33

## 2018-10-19 RX ADMIN — OMEPRAZOLE 20 MG: 20 CAPSULE, DELAYED RELEASE ORAL at 04:39

## 2018-10-19 ASSESSMENT — PAIN SCALES - GENERAL
PAINLEVEL_OUTOF10: 0
PAINLEVEL_OUTOF10: 7
PAINLEVEL_OUTOF10: 0
PAINLEVEL_OUTOF10: 9
PAINLEVEL_OUTOF10: 5
PAINLEVEL_OUTOF10: 9
PAINLEVEL_OUTOF10: 0
PAINLEVEL_OUTOF10: 8
PAINLEVEL_OUTOF10: 7
PAINLEVEL_OUTOF10: 8
PAINLEVEL_OUTOF10: 9
PAINLEVEL_OUTOF10: 8
PAINLEVEL_OUTOF10: 0
PAINLEVEL_OUTOF10: 0
PAINLEVEL_OUTOF10: 6
PAINLEVEL_OUTOF10: 0

## 2018-10-19 ASSESSMENT — ENCOUNTER SYMPTOMS
HEADACHES: 0
HEARTBURN: 0
NECK PAIN: 0
WEAKNESS: 1
DEPRESSION: 0
MUSCULOSKELETAL NEGATIVE: 1
PALPITATIONS: 0
VOMITING: 0
BLURRED VISION: 1
SHORTNESS OF BREATH: 1
DIZZINESS: 0
NERVOUS/ANXIOUS: 1
BRUISES/BLEEDS EASILY: 0
POLYDIPSIA: 0
BACK PAIN: 0
FOCAL WEAKNESS: 0
FEVER: 1
SPUTUM PRODUCTION: 1
COUGH: 1
NAUSEA: 0
CARDIOVASCULAR NEGATIVE: 1
CHILLS: 1
GASTROINTESTINAL NEGATIVE: 1

## 2018-10-19 NOTE — PROGRESS NOTES
Critical Care Progress Note    Date of admission  10/16/2018    Chief Complaint  43 y.o. female admitted 10/16/2018 with sepsis, lactic acidosis, elevated troponin and respiratory failure requiring BiPAP in the ED    Hospital Course    43 y.o. female who presented 10/16/2018 past medical history of COPD and asthma.  She also has a history of  hypothyroid with Graves' disease.  She was following with a local endocrinologist, however they would not prescribe her steroids, and she saw the endocrinologist in [place], started on steroids 1 month ago, 40 mg daily.  She is not on any Bactrim prophylactic dosing.  She does not have a cough.  She feels that her chest is tight, she has had difficulty breathing for 3 days, she thought it was an asthma exacerbation and took her inhalers, she is supposed to be on Brio Ellipta and Spiriva at home, she still smokes cigarettes, she did not get better and today felt like she could not breathe and her  brought her to the hospital.  She denies any history of cardiac disease.  Her chest x-ray shows diffuse infiltrates, suspicious for either volume overload or potentially Arntson and an atypical infection.  She has not had any sick contacts recently.  She does not currently have chest pain.  She continues to wheeze.  In the ER she was hypoxic on nasal cannula and oxygen mask, transitioned to BiPAP and remains saturating 90-91% on 80% FiO2, BiPAP settings 12/8.  She currently feels better.  She denies any abdominal pain, dysuria, polyuria, headaches, acute motor or sensory changes, or lower extremity swelling.  She had a blood clot when she was younger secondary to a leg injury.  She is not currently on any anticoagulation.  Initially she was hypotensive in the emergency room down to 70 systolic, and received IV fluid resuscitation.  Other medical past medical history includes anxiety, depression, asthma, beta thalassemia, chronic pain, and has a history of opiate use and  seizures.  She says she is not currently on any narcotics and she does not use IV or recreational drugs other than occasional marijuana.  She does not drink alcohol.      Interval Problem Update  Reviewed last 24 hour events:  A/o, NFE  Afebrile  Up and amb  8 lpm oxy mask, IS 2.5  CXR reviewed; int edema  Echo  Lovenox, P, home PPI  levaquin day 4  Replace Mg today  Pred 60  FOB Pending  Echo - noted EF 55%, moderate pulmonary hypertension, estimated PA pressure 50 mmHg  ? diuresis    Yesterday  NFE  SR/ST, SBP 90's  Afebrile  beryl diet  UOP, I/O 2.5/1.5  8-9 LPM oxy mask  Levaquin day 3  Sputum, pcp, ldh p today      Review of Systems  Review of Systems   Unable to perform ROS: Acuity of condition        Vital Signs for last 24 hours   Temp:  [36.6 °C (97.8 °F)-36.9 °C (98.5 °F)] 36.7 °C (98 °F)  Pulse:  [] 87  Resp:  [11-42] 16    Hemodynamic parameters for last 24 hours       Vent Settings for last 24 hours       Physical Exam   Physical Exam   Constitutional: She appears well-developed.   HENT:   Head: Normocephalic and atraumatic.   Eyes: Pupils are equal, round, and reactive to light.   Neck: Neck supple. No tracheal deviation present.   Cardiovascular: Normal rate.    No murmur heard.  Pulmonary/Chest: She has rales.   Diminished throughout, scattered coarse crackles,   Abdominal: Soft. She exhibits no distension. There is no tenderness.   Musculoskeletal: She exhibits no edema.   Neurological: She is alert. No cranial nerve deficit. Coordination normal.   Skin: Skin is warm and dry.       Medications  Current Facility-Administered Medications   Medication Dose Route Frequency Provider Last Rate Last Dose   • levoFLOXacin (LEVAQUIN) tablet 750 mg  750 mg Oral DAILY Yunior Villalobos M.D.   750 mg at 10/19/18 0507   • magnesium sulfate IVPB premix 2 g  2 g Intravenous Once Jos Sepulveda M.D. 25 mL/hr at 10/19/18 0816 2 g at 10/19/18 0816   • ipratropium-albuterol (DUONEB) nebulizer solution  3 mL  Nebulization 4X/DAY Yunior Villalobos M.D.   3 mL at 10/19/18 0716   • predniSONE (DELTASONE) tablet 60 mg  60 mg Oral DAILY Rodolfo Stinson M.D.   60 mg at 10/19/18 0439   • guaiFENesin LA (MUCINEX) tablet 600 mg  600 mg Oral Q12HRS Rodolfo Stinson M.D.   600 mg at 10/19/18 0438   • omeprazole (PRILOSEC) capsule 20 mg  20 mg Oral BID Rodolfo Stinson M.D.   20 mg at 10/19/18 0439   • oxyCODONE immediate-release (ROXICODONE) tablet 5 mg  5 mg Oral Q4HRS PRN Rodolfo Stinson M.D.   5 mg at 10/19/18 0430   • nicotine (NICODERM) 21 MG/24HR 21 mg  21 mg Transdermal Daily-0600 Rodolfo Stinson M.D.        And   • nicotine polacrilex (NICORETTE) 2 MG piece 2 mg  2 mg Oral Q HOUR PRN Rodolfo Stinson M.D.       • clotrimazole (MYCELEX) tablet 10 mg  10 mg Oral 5X/DAY Rodolfo Stinson M.D.   10 mg at 10/19/18 0700   • ALPRAZolam (XANAX) tablet 1 mg  1 mg Oral TID PRN Yunior Villalobos M.D.   1 mg at 10/19/18 0441   • gabapentin (NEURONTIN) capsule 600 mg  600 mg Oral TID Yunior Villalobos M.D.   600 mg at 10/19/18 0439   • levothyroxine (SYNTHROID) tablet 62.5 mcg  62.5 mcg Oral DAILY Yunior Villalobos M.D.   62.5 mcg at 10/19/18 0438   • traZODone (DESYREL) tablet 100 mg  100 mg Oral HS PRN Yunior Villalobos M.D.       • Respiratory Care per Protocol   Nebulization Continuous RT Yunior Villalobos M.D.       • senna-docusate (PERICOLACE or SENOKOT S) 8.6-50 MG per tablet 2 Tab  2 Tab Oral BID Yunior Villalobos M.D.   2 Tab at 10/19/18 0438    And   • polyethylene glycol/lytes (MIRALAX) PACKET 1 Packet  1 Packet Oral QDAY PRN Yunior Villalobos M.D.        And   • magnesium hydroxide (MILK OF MAGNESIA) suspension 30 mL  30 mL Oral QDAY PRN Yunior Villalobos M.D.        And   • bisacodyl (DULCOLAX) suppository 10 mg  10 mg Rectal QDAY PRN Yunior Villalobos M.D.       • acetaminophen (TYLENOL) tablet 650 mg  650 mg Oral Q6HRS PRN Yunior Villalobos M.D.       • ondansetron (ZOFRAN) syringe/vial injection 4 mg  4 mg Intravenous Q4HRS PRN Yunior Villalobos M.D.       •  ondansetron (ZOFRAN ODT) dispertab 4 mg  4 mg Oral Q4HRS PRN Yunior Villalobos M.D.       • promethazine (PHENERGAN) tablet 12.5-25 mg  12.5-25 mg Oral Q4HRS PRN Yunior Villalobos M.D.       • promethazine (PHENERGAN) suppository 12.5-25 mg  12.5-25 mg Rectal Q4HRS PRN Yunior Villalobos M.D.       • prochlorperazine (COMPAZINE) injection 5-10 mg  5-10 mg Intravenous Q4HRS PRN Yunior Villalobos M.D.       • MD Alert...ICU Electrolyte Replacement per Pharmacy   Other pharmacy to dose Kei Curiel M.D.       • enoxaparin (LOVENOX) inj 40 mg  40 mg Subcutaneous DAILY Kei Curiel M.D.   Stopped at 10/19/18 0600   • aspirin (ASA) tablet 325 mg  325 mg Oral DAILY Yunior Villalobos M.D.   325 mg at 10/19/18 0438   • atorvastatin (LIPITOR) tablet 40 mg  40 mg Oral Q EVENING Yunior Villalobos M.D.   40 mg at 10/18/18 1739       Fluids    Intake/Output Summary (Last 24 hours) at 10/19/18 0924  Last data filed at 10/19/18 0900   Gross per 24 hour   Intake             2210 ml   Output             1400 ml   Net              810 ml       Laboratory  Recent Results (from the past 48 hour(s))   OCCULT BLOOD X2 (STOOL)    Collection Time: 10/17/18 10:50 AM   Result Value Ref Range    Occult Blood 1 Positive (A) Negative   FERRITIN    Collection Time: 10/17/18 11:47 AM   Result Value Ref Range    Ferritin 150.3 10.0 - 291.0 ng/mL   IRON/TOTAL IRON BIND    Collection Time: 10/17/18 11:47 AM   Result Value Ref Range    Iron 45 40 - 170 ug/dL    Total Iron Binding 300 250 - 450 ug/dL    % Saturation 15 15 - 55 %   PROCALCITONIN    Collection Time: 10/17/18 11:47 AM   Result Value Ref Range    Procalcitonin 0.09 <0.25 ng/mL   CBC WITHOUT DIFFERENTIAL    Collection Time: 10/18/18  3:15 AM   Result Value Ref Range    WBC 9.1 4.8 - 10.8 K/uL    RBC 3.98 (L) 4.20 - 5.40 M/uL    Hemoglobin 8.2 (L) 12.0 - 16.0 g/dL    Hematocrit 26.5 (L) 37.0 - 47.0 %    MCV 66.6 (L) 81.4 - 97.8 fL    MCH 20.6 (L) 27.0 - 33.0 pg    MCHC 30.9 (L) 33.6 - 35.0 g/dL    RDW 39.5 35.9 - 50.0 fL     Platelet Count 121 (L) 164 - 446 K/uL    MPV 9.7 9.0 - 12.9 fL   COMP METABOLIC PANEL    Collection Time: 10/18/18  3:15 AM   Result Value Ref Range    Sodium 141 135 - 145 mmol/L    Potassium 3.7 3.6 - 5.5 mmol/L    Chloride 105 96 - 112 mmol/L    Co2 28 20 - 33 mmol/L    Anion Gap 8.0 0.0 - 11.9    Glucose 124 (H) 65 - 99 mg/dL    Bun 12 8 - 22 mg/dL    Creatinine 0.57 0.50 - 1.40 mg/dL    Calcium 8.6 8.5 - 10.5 mg/dL    AST(SGOT) 17 12 - 45 U/L    ALT(SGPT) 13 2 - 50 U/L    Alkaline Phosphatase 75 30 - 99 U/L    Total Bilirubin 0.6 0.1 - 1.5 mg/dL    Albumin 3.4 3.2 - 4.9 g/dL    Total Protein 5.4 (L) 6.0 - 8.2 g/dL    Globulin 2.0 1.9 - 3.5 g/dL    A-G Ratio 1.7 g/dL   MAGNESIUM    Collection Time: 10/18/18  3:15 AM   Result Value Ref Range    Magnesium 1.9 1.5 - 2.5 mg/dL   PHOSPHORUS    Collection Time: 10/18/18  3:15 AM   Result Value Ref Range    Phosphorus 2.3 (L) 2.5 - 4.5 mg/dL   ESTIMATED GFR    Collection Time: 10/18/18  3:15 AM   Result Value Ref Range    GFR If African American >60 >60 mL/min/1.73 m 2    GFR If Non African American >60 >60 mL/min/1.73 m 2   LDH    Collection Time: 10/18/18  9:00 AM   Result Value Ref Range    LDH Total 666 (H) 107 - 266 U/L   CBC with Differential    Collection Time: 10/19/18  4:30 AM   Result Value Ref Range    WBC 5.1 4.8 - 10.8 K/uL    RBC 4.07 (L) 4.20 - 5.40 M/uL    Hemoglobin 8.4 (L) 12.0 - 16.0 g/dL    Hematocrit 26.8 (L) 37.0 - 47.0 %    MCV 65.8 (L) 81.4 - 97.8 fL    MCH 20.6 (L) 27.0 - 33.0 pg    MCHC 31.3 (L) 33.6 - 35.0 g/dL    RDW 38.5 35.9 - 50.0 fL    Platelet Count 133 (L) 164 - 446 K/uL    MPV 9.7 9.0 - 12.9 fL    Neutrophils-Polys 81.20 (H) 44.00 - 72.00 %    Lymphocytes 13.90 (L) 22.00 - 41.00 %    Monocytes 2.70 0.00 - 13.40 %    Eosinophils 1.40 0.00 - 6.90 %    Basophils 0.20 0.00 - 1.80 %    Immature Granulocytes 0.60 0.00 - 0.90 %    Nucleated RBC 0.40 /100 WBC    Neutrophils (Absolute) 4.16 2.00 - 7.15 K/uL    Lymphs (Absolute) 0.71 (L) 1.00 -  4.80 K/uL    Monos (Absolute) 0.14 0.00 - 0.85 K/uL    Eos (Absolute) 0.07 0.00 - 0.51 K/uL    Baso (Absolute) 0.01 0.00 - 0.12 K/uL    Immature Granulocytes (abs) 0.03 0.00 - 0.11 K/uL    NRBC (Absolute) 0.02 K/uL   Basic Metabolic Panel (BMP)    Collection Time: 10/19/18  4:30 AM   Result Value Ref Range    Sodium 137 135 - 145 mmol/L    Potassium 4.2 3.6 - 5.5 mmol/L    Chloride 101 96 - 112 mmol/L    Co2 31 20 - 33 mmol/L    Glucose 104 (H) 65 - 99 mg/dL    Bun 10 8 - 22 mg/dL    Creatinine 0.55 0.50 - 1.40 mg/dL    Calcium 8.6 8.5 - 10.5 mg/dL    Anion Gap 5.0 0.0 - 11.9   Magnesium    Collection Time: 10/19/18  4:30 AM   Result Value Ref Range    Magnesium 1.9 1.5 - 2.5 mg/dL   Phosphorus    Collection Time: 10/19/18  4:30 AM   Result Value Ref Range    Phosphorus 3.7 2.5 - 4.5 mg/dL   BTYPE NATRIURETIC PEPTIDE    Collection Time: 10/19/18  4:30 AM   Result Value Ref Range    B Natriuretic Peptide 80 0 - 100 pg/mL   ESTIMATED GFR    Collection Time: 10/19/18  4:30 AM   Result Value Ref Range    GFR If African American >60 >60 mL/min/1.73 m 2    GFR If Non African American >60 >60 mL/min/1.73 m 2       Imaging  X-Ray:  I have personally reviewed the images and compared with prior images.    Assessment/Plan  Severe sepsis (HCC)- (present on admission)   Assessment & Plan    Patient meets septic criteria.   Likely pulmonary infection.    She is severely hypoxic.   She has been on 1 month of steroids at high doses 40 mg daily.    She is not on prophylaxis for pcp.   Does not create sputum.  If intubated then bronchoscopy with BAL recommended.  On Levaquin.  Depending on response to Lasix, may consider Bactrim.  PCT low for what it is worth  Follow-up LDH and induce sputum for culture and PCP DFA  Continue empiric levofloxacin and follow chest imaging        Shock (HCC)   Assessment & Plan    This is likely septic shock, improved  Follow-up echocardiogram,        Elevated troponin- (present on admission)    Assessment & Plan    This is likely demand ischemia, secondary to hypoxic respiratory failure.  Patient does not have any chest pain.  Echocardiogram pending  Cardiology following, meds reviewed -aspirin/statin, holding beta blocker ACE due to hypotension          Leukocytosis- (present on admission)   Assessment & Plan    Patient has sepsis, likely pneumonia.  Krista  See above for sepsis        Acute respiratory failure with hypoxia (HCC)- (present on admission)   Assessment & Plan    Per patient's history likely severe COPD, asthma exacerbation  Likely hypoxic respiratory failure stressed heart causing congestive heart failure, type II MI  Her BNP is normal.  Troponin elevation.  Echo pending today  Per bedside ultrasound seems hypervolemic, this may also represent arts or other atypical infection but less likely  Trial of Lasix 20 mg once, if blood pressure tolerates then repeat.  Currently on BiPAP on/off as needed  Solu-Medrol with transition to oral prednisone  RT O2 protocol  Scheduled bronchodilators  Hold home inhalers as patient unlikely be able to use at this time  This point she looks like she will avoid need for emergent intubation but will be followed very closely in the intensive care unit               VTE:  Lovenox  Ulcer: PPI  Lines: None    I have performed a physical exam and reviewed and updated ROS and Plan today (10/19/2018). In review of yesterday's note (10/18/2018), there are no changes except as documented above.     Discussed patient condition and risk of morbidity and/or mortality with Hospitalist, RN, RT and QA team    She remains on higher FiO2 and is at risk for further deterioration.  I will initiate IV furosemide and follow renal function closely.  She remains on levofloxacin.  Sputum culture has been sent and pending including further workup for PCP.  LDH is elevated at 600.  She is at risk for PCP given recent high dose corticosteroids without prophylaxis.  She will be  followed closely in the intensive care unit    The patient remains critically ill.  Critical care time = 34 minutes in directly providing and coordinating critical care and extensive data review.  No time overlap and excludes procedures.

## 2018-10-19 NOTE — PROGRESS NOTES
[facility]ist Progress Note    Date of Service: 10/19/2018    Chief Complaint  43 y.o. female admitted 10/16/2018 with a history of COPD, tobacco abuse, question of asthma, presenting with shortness of breath, cough, chills, diagnosed with community-acquired pneumonia and respiratory failure, placed on BiPAP in the ER    Interval Problem Update  Patient seen and examined today. ICU Care  Care and plan discussed in IDT/Hot rounds.  Lines and assistive devices reviewed.    Patient tolerating treatment and therapies.  All Data, Medication data reviewed.  Case discussed with nursing as available.  Plan of Care reviewed with patient and notified of changes.    10/17 the patient is improved, came off BiPAP around 11:00 PM, no further increase in respiratory complaints, wet cough, patient wants to eat, manifestation of Graves' disease, she denies any other sick contact or recent travel out of state  10/18 the patient feels better, she wants her Bird discontinued, she ambulated some, requires 8-9 L per oxygen mask, improved cough, improved chest exam  10/19 the patient feels better, afebrile, able to ambulate, on 8 L overnight per nasal cannula and mask she complains of a dry nose, remains on antibiotics, respiratory culture is pending, had a bowel movement, occult blood currently pending  Consultants/Specialty  Pulmonary    Disposition  TBD        Review of Systems   Constitutional: Positive for chills, fever and malaise/fatigue.   HENT: Negative.    Eyes: Positive for blurred vision.   Respiratory: Positive for cough, sputum production and shortness of breath.    Cardiovascular: Negative.  Negative for chest pain and palpitations.   Gastrointestinal: Negative.  Negative for heartburn, nausea and vomiting.   Genitourinary: Negative.  Negative for dysuria and frequency.   Musculoskeletal: Negative.  Negative for back pain and neck pain.   Skin: Negative.  Negative for itching and rash.   Neurological: Positive for  weakness. Negative for dizziness, focal weakness and headaches.   Endo/Heme/Allergies: Negative.  Negative for polydipsia. Does not bruise/bleed easily.   Psychiatric/Behavioral: Negative for depression. The patient is nervous/anxious.       Physical Exam  Laboratory/Imaging   Hemodynamics  Temp (24hrs), Av.7 °C (98.1 °F), Min:36.6 °C (97.8 °F), Max:36.9 °C (98.5 °F)   Temperature: 36.6 °C (97.8 °F)  Pulse  Av.5  Min: 57  Max: 124 Heart Rate (Monitored): 65  NIBP: (!) 98/50      Respiratory      Respiration: 18, Pulse Oximetry: 98 %, O2 Daily Delivery Respiratory : OxyMask     Given By:: Mouthpiece, Work Of Breathing / Effort: Mild  RUL Breath Sounds: Coarse Crackles, RML Breath Sounds: Coarse Crackles, RLL Breath Sounds: Coarse Crackles, BETH Breath Sounds: Coarse Crackles, LLL Breath Sounds: Coarse Crackles    Fluids    Intake/Output Summary (Last 24 hours) at 10/19/18 0750  Last data filed at 10/19/18 0600   Gross per 24 hour   Intake             1910 ml   Output             1750 ml   Net              160 ml       Nutrition  Orders Placed This Encounter   Procedures   • Diet Order Regular     Standing Status:   Standing     Number of Occurrences:   1     Order Specific Question:   Diet:     Answer:   Regular [1]     Physical Exam   Constitutional: She is oriented to person, place, and time. She appears well-developed and well-nourished.   HENT:   Head: Normocephalic and atraumatic.   Nose: Nose normal.   Mouth/Throat: Oropharynx is clear and moist.   Eyes: Pupils are equal, round, and reactive to light. Conjunctivae and EOM are normal.   Bilateral proptosis   Neck: Normal range of motion. Neck supple. No JVD present. No thyromegaly present.   Cardiovascular: Normal rate, regular rhythm and normal heart sounds.  Exam reveals no gallop and no friction rub.    Pulses:       Dorsalis pedis pulses are 2+ on the right side, and 2+ on the left side.   Capillary refill <3 secs   Pulmonary/Chest: Effort normal.  She has decreased breath sounds. She has no wheezes. She has rhonchi. She has rales.   Abdominal: Soft. Bowel sounds are normal. She exhibits no distension and no mass. There is no tenderness. There is no rebound and no guarding.   Musculoskeletal: Normal range of motion. She exhibits no edema or tenderness.   Lymphadenopathy:     She has no cervical adenopathy.   Neurological: She is alert and oriented to person, place, and time. No cranial nerve deficit.   Skin: Skin is warm and dry. She is not diaphoretic. No cyanosis.   Psychiatric: She has a normal mood and affect. Her behavior is normal.   Nursing note and vitals reviewed.      Recent Labs      10/17/18   0415  10/18/18   0315  10/19/18   0430   WBC  8.9  9.1  5.1   RBC  4.04*  3.98*  4.07*   HEMOGLOBIN  8.4*  8.2*  8.4*   HEMATOCRIT  26.7*  26.5*  26.8*   MCV  65.3*  66.6*  65.8*   MCH  20.8*  20.6*  20.6*   MCHC  31.8*  30.9*  31.3*   RDW  39.4  39.5  38.5   PLATELETCT  116*  121*  133*   MPV  9.7  9.7  9.7     Recent Labs      10/17/18   0415  10/18/18   0315  10/19/18   0430   SODIUM  140  141  137   POTASSIUM  3.5*  3.7  4.2   CHLORIDE  105  105  101   CO2  27  28  31   GLUCOSE  166*  124*  104*   BUN  13  12  10   CREATININE  0.58  0.57  0.55   CALCIUM  8.5  8.6  8.6         Recent Labs      10/16/18   0830  10/19/18   0430   BNPBTYPENAT  96  80              Assessment/Plan     Severe sepsis (HCC)- (present on admission)   Assessment & Plan    This is severe sepsis with the following associated acute organ dysfunction(s): acute respiratory failure.   Related to asthma exacerbation/community-acquired pneumonia  Sepsis protocol  IV fluid  Follow culture  Antibiotic with IV levofloxacin(allergic to penicillin and cephalosporin)        Anemia   Assessment & Plan    With a history of reported thalassemia  Microcytosis  Question of GI bleed or iron deficiency        Tobacco abuse   Assessment & Plan    Nicotine replacement protocol        Graves disease    Assessment & Plan    With significant ophthalmic findings  Placed on high-dose steroids on outpatient basis        Elevated troponin- (present on admission)   Assessment & Plan    With chest pain  Likely related to demand ischemia?  Cardiology consulted  Check echo and trend trop        Leukocytosis- (present on admission)   Assessment & Plan    Related to above        Acute respiratory failure with hypoxia (HCC)- (present on admission)   Assessment & Plan    Related to asthma/COPD exacerbation  Initially on BiPAP, weaned off  Intensivist/pulmonary consulted  Antibiotics  Steroids, the patient was on high-dose prednisone and outpatient basis  Aggressive breathing treatment, steroid, RT protocol          Quality-Core Measures   Reviewed items::  Radiology images reviewed, EKG reviewed, Labs reviewed and Medications reviewed  Bird catheter::  No Bird  DVT prophylaxis pharmacological::  Enoxaparin (Lovenox)  DVT prophylaxis - mechanical:  SCDs  Antibiotics:  Treating active infection/contamination beyond 24 hours perioperative coverage  Assessed for rehabilitation services:  Patient was assess for and/or received rehabilitation services during this hospitalization      Plan  Continue with current dose of prednisone  Continue with antibiotics, additional infectious disease labs sent, patient immunocompromised  Oxygen weaning to continue  Await cultures, follow-up  Respiratory therapy  Evaluate for cause of additional anemia, occult blood is pending  Iron studies noted  PPI to avoid further gastric compromise  Explained plan of care  Patient has significant risk, complex  Critically ill, but with potential to downgraded, overall better but still too high oxygen demand

## 2018-10-19 NOTE — PROGRESS NOTES
Monitor Summary:     Sinus rhythm/sinus tach  HR 's  .12/.10/.36    12 hour chart check complete.   2 RN skin check complete.

## 2018-10-19 NOTE — CARE PLAN
Problem: Pain Management  Goal: Pain level will decrease to patient's comfort goal  Outcome: PROGRESSING AS EXPECTED  Patient effectively uses 1-10 rating scale to identify and describe pain, patient educated regarding nonpharmacological methods to relieve pain- pt requests heating pad for pain relief, patient requests pain medication when needed, pain comfort goal of 7/10 established, pain reassessed often     Problem: Skin Integrity  Goal: Risk for impaired skin integrity will decrease  Outcome: PROGRESSING AS EXPECTED  Skin intact, patient turns self, all medical devices away from skin, patient up and walking during shift, patient with adequate nutritional intake

## 2018-10-19 NOTE — PROGRESS NOTES
Monitor Summary:    Rate: SR 70-90    WI: 0.12  QRS: 0.10  QT: 0.40      Chart/skin check complete.

## 2018-10-19 NOTE — PROGRESS NOTES
12 hour chart check performed at bedside    2 RN skin assessment complete     Monitor Summary  SR, ST  HR   0.14/0.08/0.38

## 2018-10-19 NOTE — CARE PLAN
Problem: Safety  Goal: Will remain free from falls  Outcome: PROGRESSING AS EXPECTED    Intervention: Assess risk factors for falls  Candace canchola fall risk assessment complete.   Intervention: Implement fall precautions  Bed locked and in lowest position. Bed alarm on. Pt's belongings and call light within reach. Pt educated on importance of pressing call light PRN.         Problem: Mobility  Goal: Risk for activity intolerance will decrease  Outcome: PROGRESSING AS EXPECTED    Intervention: Assess and monitor signs of activity intolerance  Pt ambulated with frequent rest periods.   Intervention: Provide rest periods  Pt dangled on edge of bed prior to standing. Rest periods given during ambulation.   Intervention: Encourage patient to increase activity level in collaboration with Interdisciplinary Team  Pt walked 500 ft today.

## 2018-10-19 NOTE — FLOWSHEET NOTE
10/19/18 1609   Interdisciplinary Plan of Care-Goals (Indications)   Bronchodilator Indications History / Diagnosis   Interdisciplinary Plan of Care-Outcomes    Bronchodilator Outcome Patient at Stable Baseline   Education   Education Yes - Pt. / Family has been Instructed in use of Respiratory Equipment;Yes - Pt. / Family has been Instructed in use of Respiratory Medications and Adverse Reactions   SVN Group   #SVN Performed Yes   Given By: Mouthpiece   Incentive Spirometry Group   Incentive Spirometry Instruction Yes   Breathing Exercises Yes   Incentive Spirometer Volume 2500 mL   Respiratory WDL   Respiratory (WDL) X   Chest Exam   Work Of Breathing / Effort Mild   Respiration 18   Pulse 61   Breath Sounds   Pre/Post Intervention Pre Intervention Assessment   RUL Breath Sounds Coarse Crackles   RML Breath Sounds Coarse Crackles   RLL Breath Sounds Fine Crackles   BETH Breath Sounds Coarse Crackles   LLL Breath Sounds Fine Crackles   Oximetry   Continuous Oximetry Yes   Oxygen   Pulse Oximetry 94 %   O2 (LPM) 5   O2 Daily Delivery Respiratory  OxyMask

## 2018-10-20 ENCOUNTER — APPOINTMENT (OUTPATIENT)
Dept: RADIOLOGY | Facility: MEDICAL CENTER | Age: 43
DRG: 871 | End: 2018-10-20
Attending: INTERNAL MEDICINE
Payer: MEDICAID

## 2018-10-20 LAB
ANION GAP SERPL CALC-SCNC: 7 MMOL/L (ref 0–11.9)
BASOPHILS # BLD AUTO: 0.2 % (ref 0–1.8)
BASOPHILS # BLD: 0.01 K/UL (ref 0–0.12)
BNP SERPL-MCNC: 25 PG/ML (ref 0–100)
BUN SERPL-MCNC: 11 MG/DL (ref 8–22)
CALCIUM SERPL-MCNC: 8.6 MG/DL (ref 8.5–10.5)
CHLORIDE SERPL-SCNC: 102 MMOL/L (ref 96–112)
CO2 SERPL-SCNC: 31 MMOL/L (ref 20–33)
CREAT SERPL-MCNC: 0.63 MG/DL (ref 0.5–1.4)
EOSINOPHIL # BLD AUTO: 0.11 K/UL (ref 0–0.51)
EOSINOPHIL NFR BLD: 2.1 % (ref 0–6.9)
ERYTHROCYTE [DISTWIDTH] IN BLOOD BY AUTOMATED COUNT: 39 FL (ref 35.9–50)
GLUCOSE SERPL-MCNC: 91 MG/DL (ref 65–99)
HCT VFR BLD AUTO: 28.7 % (ref 37–47)
HGB BLD-MCNC: 8.9 G/DL (ref 12–16)
IMM GRANULOCYTES # BLD AUTO: 0.05 K/UL (ref 0–0.11)
IMM GRANULOCYTES NFR BLD AUTO: 1 % (ref 0–0.9)
LYMPHOCYTES # BLD AUTO: 1.43 K/UL (ref 1–4.8)
LYMPHOCYTES NFR BLD: 27.7 % (ref 22–41)
MCH RBC QN AUTO: 20.6 PG (ref 27–33)
MCHC RBC AUTO-ENTMCNC: 31 G/DL (ref 33.6–35)
MCV RBC AUTO: 66.6 FL (ref 81.4–97.8)
MONOCYTES # BLD AUTO: 0.08 K/UL (ref 0–0.85)
MONOCYTES NFR BLD AUTO: 1.5 % (ref 0–13.4)
NEUTROPHILS # BLD AUTO: 3.49 K/UL (ref 2–7.15)
NEUTROPHILS NFR BLD: 67.5 % (ref 44–72)
NRBC # BLD AUTO: 0.02 K/UL
NRBC BLD-RTO: 0.4 /100 WBC
PLATELET # BLD AUTO: 148 K/UL (ref 164–446)
PMV BLD AUTO: 9.8 FL (ref 9–12.9)
POTASSIUM SERPL-SCNC: 3.5 MMOL/L (ref 3.6–5.5)
PROCALCITONIN SERPL-MCNC: <0.05 NG/ML
RBC # BLD AUTO: 4.31 M/UL (ref 4.2–5.4)
SODIUM SERPL-SCNC: 140 MMOL/L (ref 135–145)
TEST NAME 95000: NORMAL
WBC # BLD AUTO: 5.2 K/UL (ref 4.8–10.8)

## 2018-10-20 PROCEDURE — A9270 NON-COVERED ITEM OR SERVICE: HCPCS | Performed by: HOSPITALIST

## 2018-10-20 PROCEDURE — 700102 HCHG RX REV CODE 250 W/ 637 OVERRIDE(OP): Performed by: HOSPITALIST

## 2018-10-20 PROCEDURE — 700102 HCHG RX REV CODE 250 W/ 637 OVERRIDE(OP): Performed by: INTERNAL MEDICINE

## 2018-10-20 PROCEDURE — 94640 AIRWAY INHALATION TREATMENT: CPT

## 2018-10-20 PROCEDURE — 770001 HCHG ROOM/CARE - MED/SURG/GYN PRIV*

## 2018-10-20 PROCEDURE — 80048 BASIC METABOLIC PNL TOTAL CA: CPT

## 2018-10-20 PROCEDURE — 85025 COMPLETE CBC W/AUTO DIFF WBC: CPT

## 2018-10-20 PROCEDURE — 83880 ASSAY OF NATRIURETIC PEPTIDE: CPT

## 2018-10-20 PROCEDURE — 700111 HCHG RX REV CODE 636 W/ 250 OVERRIDE (IP): Performed by: HOSPITALIST

## 2018-10-20 PROCEDURE — 84145 PROCALCITONIN (PCT): CPT

## 2018-10-20 PROCEDURE — 700101 HCHG RX REV CODE 250: Performed by: INTERNAL MEDICINE

## 2018-10-20 PROCEDURE — 99233 SBSQ HOSP IP/OBS HIGH 50: CPT | Performed by: INTERNAL MEDICINE

## 2018-10-20 PROCEDURE — 700111 HCHG RX REV CODE 636 W/ 250 OVERRIDE (IP): Performed by: INTERNAL MEDICINE

## 2018-10-20 PROCEDURE — 71045 X-RAY EXAM CHEST 1 VIEW: CPT

## 2018-10-20 PROCEDURE — 99232 SBSQ HOSP IP/OBS MODERATE 35: CPT | Performed by: HOSPITALIST

## 2018-10-20 PROCEDURE — A9270 NON-COVERED ITEM OR SERVICE: HCPCS | Performed by: INTERNAL MEDICINE

## 2018-10-20 PROCEDURE — 99407 BEHAV CHNG SMOKING > 10 MIN: CPT

## 2018-10-20 RX ORDER — POTASSIUM CHLORIDE 20 MEQ/1
60 TABLET, EXTENDED RELEASE ORAL ONCE
Status: COMPLETED | OUTPATIENT
Start: 2018-10-20 | End: 2018-10-20

## 2018-10-20 RX ORDER — FUROSEMIDE 10 MG/ML
20 INJECTION INTRAMUSCULAR; INTRAVENOUS
Status: COMPLETED | OUTPATIENT
Start: 2018-10-20 | End: 2018-10-21

## 2018-10-20 RX ADMIN — GABAPENTIN 600 MG: 300 CAPSULE ORAL at 17:00

## 2018-10-20 RX ADMIN — GUAIFENESIN 600 MG: 600 TABLET, EXTENDED RELEASE ORAL at 06:13

## 2018-10-20 RX ADMIN — OMEPRAZOLE 20 MG: 20 CAPSULE, DELAYED RELEASE ORAL at 06:11

## 2018-10-20 RX ADMIN — CLOTRIMAZOLE 10 MG: 10 LOZENGE ORAL; TOPICAL at 12:00

## 2018-10-20 RX ADMIN — CLOTRIMAZOLE 10 MG: 10 LOZENGE ORAL; TOPICAL at 19:00

## 2018-10-20 RX ADMIN — FUROSEMIDE 20 MG: 10 INJECTION, SOLUTION INTRAMUSCULAR; INTRAVENOUS at 07:44

## 2018-10-20 RX ADMIN — OXYCODONE HYDROCHLORIDE 5 MG: 5 TABLET ORAL at 19:35

## 2018-10-20 RX ADMIN — IPRATROPIUM BROMIDE AND ALBUTEROL SULFATE 3 ML: .5; 3 SOLUTION RESPIRATORY (INHALATION) at 08:06

## 2018-10-20 RX ADMIN — FUROSEMIDE 20 MG: 10 INJECTION, SOLUTION INTRAMUSCULAR; INTRAVENOUS at 15:33

## 2018-10-20 RX ADMIN — PREDNISONE 60 MG: 20 TABLET ORAL at 06:11

## 2018-10-20 RX ADMIN — GABAPENTIN 600 MG: 300 CAPSULE ORAL at 06:13

## 2018-10-20 RX ADMIN — POTASSIUM CHLORIDE 60 MEQ: 1500 TABLET, EXTENDED RELEASE ORAL at 07:44

## 2018-10-20 RX ADMIN — IPRATROPIUM BROMIDE AND ALBUTEROL SULFATE 3 ML: .5; 3 SOLUTION RESPIRATORY (INHALATION) at 11:01

## 2018-10-20 RX ADMIN — TRAZODONE HYDROCHLORIDE 100 MG: 50 TABLET ORAL at 21:58

## 2018-10-20 RX ADMIN — LEVOTHYROXINE SODIUM 62.5 MCG: 0.12 TABLET ORAL at 06:12

## 2018-10-20 RX ADMIN — IPRATROPIUM BROMIDE AND ALBUTEROL SULFATE 3 ML: .5; 3 SOLUTION RESPIRATORY (INHALATION) at 15:02

## 2018-10-20 RX ADMIN — GUAIFENESIN 600 MG: 600 TABLET, EXTENDED RELEASE ORAL at 17:00

## 2018-10-20 RX ADMIN — CLOTRIMAZOLE 10 MG: 10 LOZENGE ORAL; TOPICAL at 23:00

## 2018-10-20 RX ADMIN — OMEPRAZOLE 20 MG: 20 CAPSULE, DELAYED RELEASE ORAL at 17:00

## 2018-10-20 RX ADMIN — CLOTRIMAZOLE 10 MG: 10 LOZENGE ORAL; TOPICAL at 06:19

## 2018-10-20 RX ADMIN — LEVOFLOXACIN 750 MG: 750 TABLET, FILM COATED ORAL at 06:11

## 2018-10-20 RX ADMIN — OXYCODONE HYDROCHLORIDE 5 MG: 5 TABLET ORAL at 10:20

## 2018-10-20 RX ADMIN — SALINE NASAL SPRAY 2 SPRAY: 1.5 SOLUTION NASAL at 17:00

## 2018-10-20 RX ADMIN — OXYCODONE HYDROCHLORIDE 5 MG: 5 TABLET ORAL at 15:32

## 2018-10-20 RX ADMIN — IPRATROPIUM BROMIDE AND ALBUTEROL SULFATE 3 ML: .5; 3 SOLUTION RESPIRATORY (INHALATION) at 19:21

## 2018-10-20 RX ADMIN — GABAPENTIN 600 MG: 300 CAPSULE ORAL at 12:08

## 2018-10-20 RX ADMIN — ASPIRIN 325 MG: 325 TABLET, COATED ORAL at 06:13

## 2018-10-20 RX ADMIN — OXYCODONE HYDROCHLORIDE 5 MG: 5 TABLET ORAL at 01:40

## 2018-10-20 RX ADMIN — ALPRAZOLAM 1 MG: 1 TABLET ORAL at 04:00

## 2018-10-20 RX ADMIN — SALINE NASAL SPRAY 2 SPRAY: 1.5 SOLUTION NASAL at 12:09

## 2018-10-20 RX ADMIN — ATORVASTATIN CALCIUM 40 MG: 40 TABLET, FILM COATED ORAL at 17:00

## 2018-10-20 RX ADMIN — ALPRAZOLAM 1 MG: 1 TABLET ORAL at 12:17

## 2018-10-20 RX ADMIN — ALPRAZOLAM 1 MG: 1 TABLET ORAL at 21:15

## 2018-10-20 RX ADMIN — OXYCODONE HYDROCHLORIDE 5 MG: 5 TABLET ORAL at 06:19

## 2018-10-20 RX ADMIN — CLOTRIMAZOLE 10 MG: 10 LOZENGE ORAL; TOPICAL at 15:33

## 2018-10-20 ASSESSMENT — ENCOUNTER SYMPTOMS
NAUSEA: 0
VOMITING: 0
CHILLS: 1
POLYDIPSIA: 0
DEPRESSION: 0
WEAKNESS: 1
MUSCULOSKELETAL NEGATIVE: 1
BRUISES/BLEEDS EASILY: 0
COUGH: 1
NERVOUS/ANXIOUS: 1
SHORTNESS OF BREATH: 1
FOCAL WEAKNESS: 0
NECK PAIN: 0
BLURRED VISION: 1
CARDIOVASCULAR NEGATIVE: 1
HEADACHES: 0
PALPITATIONS: 0
GASTROINTESTINAL NEGATIVE: 1
BACK PAIN: 0
SPUTUM PRODUCTION: 1
DIZZINESS: 0
FEVER: 1
HEARTBURN: 0

## 2018-10-20 ASSESSMENT — PAIN SCALES - GENERAL
PAINLEVEL_OUTOF10: 5
PAINLEVEL_OUTOF10: 0
PAINLEVEL_OUTOF10: 8
PAINLEVEL_OUTOF10: 6
PAINLEVEL_OUTOF10: 0
PAINLEVEL_OUTOF10: 0
PAINLEVEL_OUTOF10: 7
PAINLEVEL_OUTOF10: 0
PAINLEVEL_OUTOF10: 5
PAINLEVEL_OUTOF10: 0
PAINLEVEL_OUTOF10: 4

## 2018-10-20 NOTE — CARE PLAN
Problem: Discharge Barriers/Planning  Goal: Patient's continuum of care needs will be met  Outcome: PROGRESSING AS EXPECTED  Possible need for home o2; see home o2 assessment; will reevaluate tomorrow.    Problem: Fluid Volume:  Goal: Will maintain balanced intake and output  Outcome: PROGRESSING AS EXPECTED  Pt started on BID IV lasix.

## 2018-10-20 NOTE — FLOWSHEET NOTE
10/20/18 1502   Interdisciplinary Plan of Care-Goals (Indications)   Bronchodilator Indications History / Diagnosis   Interdisciplinary Plan of Care-Outcomes    Bronchodilator Outcome Patient at Stable Baseline   Education   Education Yes - Pt. / Family has been Instructed in use of Respiratory Equipment;Yes - Pt. / Family has been Instructed in use of Respiratory Medications and Adverse Reactions   SVN Group   #SVN Performed Yes   Given By: Mouthpiece   Incentive Spirometry Group   Incentive Spirometry Instruction Yes   Breathing Exercises Yes   Incentive Spirometer Volume 2500 mL   Respiratory WDL   Respiratory (WDL) X   Chest Exam   Work Of Breathing / Effort Mild   Respiration 18   Pulse 67   Breath Sounds   Pre/Post Intervention Pre Intervention Assessment   RUL Breath Sounds Clear   RML Breath Sounds Clear   RLL Breath Sounds Diminished   BETH Breath Sounds Clear   LLL Breath Sounds Diminished   Oximetry   Continuous Oximetry Yes   Oxygen   Pulse Oximetry 94 %   O2 (LPM) 2   O2 Daily Delivery Respiratory  Silicone Nasal Cannula

## 2018-10-20 NOTE — CARE PLAN
Problem: Communication  Goal: The ability to communicate needs accurately and effectively will improve  Outcome: PROGRESSING AS EXPECTED  Pt. Calls out appropriately and effectively communicates her needs.     Problem: Pain Management  Goal: Pain level will decrease to patient's comfort goal  Outcome: PROGRESSING AS EXPECTED  Pain controlled with PRN medications. See MAR for administrations.

## 2018-10-20 NOTE — PROGRESS NOTES
[facility]    Date of Service: 10/20/2018    Chief Complaint  43 y.o. female admitted 10/16/2018 with a history of COPD, tobacco abuse, question of asthma, presenting with shortness of breath, cough, chills, diagnosed with community-acquired pneumonia and respiratory failure, placed on BiPAP in the ER    Interval Problem Update  Patient seen and examined today. ICU Care  Care and plan discussed in IDT/Hot rounds.  Lines and assistive devices reviewed.    Patient tolerating treatment and therapies.  All Data, Medication data reviewed.  Case discussed with nursing as available.  Plan of Care reviewed with patient and notified of changes.    10/17 the patient is improved, came off BiPAP around 11:00 PM, no further increase in respiratory complaints, wet cough, patient wants to eat, manifestation of Graves' disease, she denies any other sick contact or recent travel out of state  10/18 the patient feels better, she wants her Bird discontinued, she ambulated some, requires 8-9 L per oxygen mask, improved cough, improved chest exam  10/19 the patient feels better, afebrile, able to ambulate, on 8 L overnight per nasal cannula and mask she complains of a dry nose, remains on antibiotics, respiratory culture is pending, had a bowel movement, occult blood currently pending  10/20 patient is improving, blood pressure is on the low side, no symptoms though, the patient states that she always is low, she ambulates, she is on 4 L per nasal cannula, chest x-ray is improving, tolerating mild diuresis  Consultants/Specialty  Pulmonary    Disposition  TBD        Review of Systems   Constitutional: Positive for chills, fever and malaise/fatigue.   HENT: Negative.    Eyes: Positive for blurred vision.   Respiratory: Positive for cough, sputum production and shortness of breath.    Cardiovascular: Negative.  Negative for chest pain and palpitations.   Gastrointestinal: Negative.  Negative for heartburn, nausea  and vomiting.   Genitourinary: Negative.  Negative for dysuria and frequency.   Musculoskeletal: Negative.  Negative for back pain and neck pain.   Skin: Negative.  Negative for itching and rash.   Neurological: Positive for weakness. Negative for dizziness, focal weakness and headaches.   Endo/Heme/Allergies: Negative.  Negative for polydipsia. Does not bruise/bleed easily.   Psychiatric/Behavioral: Negative for depression. The patient is nervous/anxious.       Physical Exam  Laboratory/Imaging   Hemodynamics  Temp (24hrs), Av.7 °C (98.1 °F), Min:36.6 °C (97.9 °F), Max:37 °C (98.6 °F)   Temperature: 36.6 °C (97.9 °F)  Pulse  Av.4  Min: 57  Max: 124    NIBP: (!) 91/46      Respiratory      Respiration: 18, Pulse Oximetry: 93 %, O2 Daily Delivery Respiratory : OxyMask     Given By:: Mouthpiece, Work Of Breathing / Effort: Mild  RUL Breath Sounds: Clear, RML Breath Sounds: Clear, RLL Breath Sounds: Diminished, BETH Breath Sounds: Clear, LLL Breath Sounds: Diminished    Fluids    Intake/Output Summary (Last 24 hours) at 10/20/18 0750  Last data filed at 10/20/18 0600   Gross per 24 hour   Intake             1080 ml   Output                0 ml   Net             1080 ml       Nutrition  Orders Placed This Encounter   Procedures   • Diet Order Regular     Standing Status:   Standing     Number of Occurrences:   1     Order Specific Question:   Diet:     Answer:   Regular [1]     Physical Exam   Constitutional: She is oriented to person, place, and time. She appears well-developed and well-nourished.   HENT:   Head: Normocephalic and atraumatic.   Nose: Nose normal.   Mouth/Throat: Oropharynx is clear and moist.   Eyes: Pupils are equal, round, and reactive to light. Conjunctivae and EOM are normal.   Bilateral proptosis   Neck: Normal range of motion. Neck supple. No JVD present. No thyromegaly present.   Cardiovascular: Normal rate, regular rhythm and normal heart sounds.  Exam reveals no gallop and no friction  rub.    Pulses:       Dorsalis pedis pulses are 2+ on the right side, and 2+ on the left side.   Capillary refill <3 secs   Pulmonary/Chest: Effort normal. She has decreased breath sounds. She has no wheezes. She has rhonchi. She has rales.   Abdominal: Soft. Bowel sounds are normal. She exhibits no distension and no mass. There is no tenderness. There is no rebound and no guarding.   Musculoskeletal: Normal range of motion. She exhibits no edema or tenderness.   Lymphadenopathy:     She has no cervical adenopathy.   Neurological: She is alert and oriented to person, place, and time. No cranial nerve deficit.   Skin: Skin is warm and dry. She is not diaphoretic. No cyanosis.   Psychiatric: She has a normal mood and affect. Her behavior is normal.   Nursing note and vitals reviewed.      Recent Labs      10/18/18   0315  10/19/18   0430  10/20/18   0400   WBC  9.1  5.1  5.2   RBC  3.98*  4.07*  4.31   HEMOGLOBIN  8.2*  8.4*  8.9*   HEMATOCRIT  26.5*  26.8*  28.7*   MCV  66.6*  65.8*  66.6*   MCH  20.6*  20.6*  20.6*   MCHC  30.9*  31.3*  31.0*   RDW  39.5  38.5  39.0   PLATELETCT  121*  133*  148*   MPV  9.7  9.7  9.8     Recent Labs      10/18/18   0315  10/19/18   0430  10/20/18   0400   SODIUM  141  137  140   POTASSIUM  3.7  4.2  3.5*   CHLORIDE  105  101  102   CO2  28  31  31   GLUCOSE  124*  104*  91   BUN  12  10  11   CREATININE  0.57  0.55  0.63   CALCIUM  8.6  8.6  8.6         Recent Labs      10/19/18   0430  10/20/18   0400   BNPBTYPENAT  80  25              Assessment/Plan     Severe sepsis (HCC)- (present on admission)   Assessment & Plan    This is severe sepsis with the following associated acute organ dysfunction(s): acute respiratory failure.   Related to asthma exacerbation/community-acquired pneumonia  Sepsis protocol  IV fluid  Follow culture  Antibiotic with IV levofloxacin(allergic to penicillin and cephalosporin)        Anemia   Assessment & Plan    With a history of reported  thalassemia  Microcytosis  Currently positive occult blood  Was on high-dose steroids  Question of upper GI irritation  H&H stable        Tobacco abuse   Assessment & Plan    Nicotine replacement protocol        Graves disease   Assessment & Plan    With significant ophthalmic findings  Placed on high-dose steroids on outpatient basis        Elevated troponin- (present on admission)   Assessment & Plan    With chest pain  Likely related to demand ischemia?  Cardiology consulted  Check echo and trend trop        Leukocytosis- (present on admission)   Assessment & Plan    Related to above        Acute respiratory failure with hypoxia (HCC)- (present on admission)   Assessment & Plan    Related to asthma/COPD exacerbation  Initially on BiPAP, weaned off  Intensivist/pulmonary consulted  Antibiotics  Steroids, the patient was on high-dose prednisone and outpatient basis  Aggressive breathing treatment, steroid, RT protocol          Quality-Core Measures   Reviewed items::  Radiology images reviewed, EKG reviewed, Labs reviewed and Medications reviewed  Bird catheter::  No Bird  DVT prophylaxis pharmacological::  Enoxaparin (Lovenox)  DVT prophylaxis - mechanical:  SCDs  Antibiotics:  Treating active infection/contamination beyond 24 hours perioperative coverage  Assessed for rehabilitation services:  Patient was assess for and/or received rehabilitation services during this hospitalization      Plan  Add GI remedies to prevent upper GI syndrome,  Positive occult blood, if significant drop noted would need GI evaluation  Continue with current dose of prednisone  Continue with antibiotics, additional infectious disease labs sent, patient immunocompromised, PCP eval negative  Oxygen weaning to continue  Await cultures, follow-up  Respiratory therapy  Explained plan of care  Patient has significant risk, complex  Critically ill, but with potential to downgraded, overall better but still too high oxygen demand

## 2018-10-20 NOTE — PROGRESS NOTES
Critical Care Progress Note    Date of admission  10/16/2018    Chief Complaint  43 y.o. female admitted 10/16/2018 with sepsis, lactic acidosis, elevated troponin and respiratory failure requiring BiPAP in the ED    Hospital Course    43 y.o. female who presented 10/16/2018 past medical history of COPD and asthma.  She also has a history of  hypothyroid with Graves' disease.  She was following with a local endocrinologist, however they would not prescribe her steroids, and she saw the endocrinologist in [place], started on steroids 1 month ago, 40 mg daily.  She is not on any Bactrim prophylactic dosing.  She does not have a cough.  She feels that her chest is tight, she has had difficulty breathing for 3 days, she thought it was an asthma exacerbation and took her inhalers, she is supposed to be on Brio Ellipta and Spiriva at home, she still smokes cigarettes, she did not get better and today felt like she could not breathe and her  brought her to the hospital.  She denies any history of cardiac disease.  Her chest x-ray shows diffuse infiltrates, suspicious for either volume overload or potentially Arntson and an atypical infection.  She has not had any sick contacts recently.  She does not currently have chest pain.  She continues to wheeze.  In the ER she was hypoxic on nasal cannula and oxygen mask, transitioned to BiPAP and remains saturating 90-91% on 80% FiO2, BiPAP settings 12/8.  She currently feels better.  She denies any abdominal pain, dysuria, polyuria, headaches, acute motor or sensory changes, or lower extremity swelling.  She had a blood clot when she was younger secondary to a leg injury.  She is not currently on any anticoagulation.  Initially she was hypotensive in the emergency room down to 70 systolic, and received IV fluid resuscitation.  Other medical past medical history includes anxiety, depression, asthma, beta thalassemia, chronic pain, and has a history of opiate use and  seizures.  She says she is not currently on any narcotics and she does not use IV or recreational drugs other than occasional marijuana.  She does not drink alcohol.      Interval Problem Update  Reviewed last 24 hour events:  Soft BP  A/o x 4  beryl PO diet  Ambulates  4 lpm oxygen, oxy mask  IS   CXR improving bilat infs  levaquin day 5 of 7  lovenox pepcid  Lasix x 3 days    Yesterday  A/o, NFE  Afebrile  Up and amb  8 lpm oxy mask, IS 2.5  CXR reviewed; int edema  Echo  Lovenox, P, home PPI  levaquin day 4  Replace Mg today  Pred 60  FOB Pending  Echo - noted EF 55%, moderate pulmonary hypertension, estimated PA pressure 50 mmHg  ? diuresis    Review of Systems  Review of Systems   Unable to perform ROS: Acuity of condition        Vital Signs for last 24 hours   Temp:  [36.6 °C (97.9 °F)-37 °C (98.6 °F)] 36.6 °C (97.9 °F)  Pulse:  [] 79  Resp:  [15-18] 18    Hemodynamic parameters for last 24 hours       Vent Settings for last 24 hours       Physical Exam   Physical Exam   Constitutional: She appears well-developed.   HENT:   Head: Normocephalic and atraumatic.   Eyes: Pupils are equal, round, and reactive to light.   Neck: Neck supple. No tracheal deviation present.   Cardiovascular: Normal rate.    No murmur heard.  Pulmonary/Chest: She has rales.   Diminished throughout, scattered coarse crackles,   Abdominal: Soft. She exhibits no distension. There is no tenderness.   Musculoskeletal: She exhibits no edema.   Neurological: She is alert. No cranial nerve deficit. Coordination normal.   Skin: Skin is warm and dry.       Medications  Current Facility-Administered Medications   Medication Dose Route Frequency Provider Last Rate Last Dose   • potassium chloride SA (Kdur) tablet 60 mEq  60 mEq Oral Once Rodolfo Stinson M.D.       • levoFLOXacin (LEVAQUIN) tablet 750 mg  750 mg Oral DAILY Yunior Villalobos M.D.   750 mg at 10/20/18 0611   • sodium chloride (OCEAN) 0.65 % nasal spray 2 Spray  2 Spray Nasal Q2HRS  PRN Rodolfo Stinson M.D.       • ipratropium-albuterol (DUONEB) nebulizer solution  3 mL Nebulization 4X/DAY Yunior Villalobos M.D.   3 mL at 10/19/18 1946   • predniSONE (DELTASONE) tablet 60 mg  60 mg Oral DAILY Rodolfo Stinson M.D.   60 mg at 10/20/18 0611   • guaiFENesin LA (MUCINEX) tablet 600 mg  600 mg Oral Q12HRS Rodolfo Stinson M.D.   600 mg at 10/20/18 0613   • omeprazole (PRILOSEC) capsule 20 mg  20 mg Oral BID Rodolfo Stinson M.D.   20 mg at 10/20/18 0611   • oxyCODONE immediate-release (ROXICODONE) tablet 5 mg  5 mg Oral Q4HRS PRN Rodolfo Stinson M.D.   5 mg at 10/20/18 0619   • nicotine (NICODERM) 21 MG/24HR 21 mg  21 mg Transdermal Daily-0600 Rodolfo Stinson M.D.        And   • nicotine polacrilex (NICORETTE) 2 MG piece 2 mg  2 mg Oral Q HOUR PRN Rodolfo Stinson M.D.       • clotrimazole (MYCELEX) tablet 10 mg  10 mg Oral 5X/DAY Rodolfo Stinson M.D.   10 mg at 10/20/18 0619   • ALPRAZolam (XANAX) tablet 1 mg  1 mg Oral TID PRN Yunior Villalobos M.D.   1 mg at 10/20/18 0400   • gabapentin (NEURONTIN) capsule 600 mg  600 mg Oral TID Yunior Villalobos M.D.   600 mg at 10/20/18 0613   • levothyroxine (SYNTHROID) tablet 62.5 mcg  62.5 mcg Oral DAILY Yunior Villalobos M.D.   62.5 mcg at 10/20/18 0612   • traZODone (DESYREL) tablet 100 mg  100 mg Oral HS PRN Yunior Villalobos M.D.   100 mg at 10/19/18 2005   • Respiratory Care per Protocol   Nebulization Continuous RT Yunior Villalobos M.D.       • senna-docusate (PERICOLACE or SENOKOT S) 8.6-50 MG per tablet 2 Tab  2 Tab Oral BID Yunior Villalobos M.D.   Stopped at 10/19/18 1800    And   • polyethylene glycol/lytes (MIRALAX) PACKET 1 Packet  1 Packet Oral QDAY PRN Yunior Villalobos M.D.        And   • magnesium hydroxide (MILK OF MAGNESIA) suspension 30 mL  30 mL Oral QDAY PRN Yunior Villalobos M.D.        And   • bisacodyl (DULCOLAX) suppository 10 mg  10 mg Rectal QDAY PRN Yunior Villalobos M.D.       • acetaminophen (TYLENOL) tablet 650 mg  650 mg Oral Q6HRS PRN Yunior Villalobos M.D.        • ondansetron (ZOFRAN) syringe/vial injection 4 mg  4 mg Intravenous Q4HRS PRN Yunior Villalobos M.D.       • ondansetron (ZOFRAN ODT) dispertab 4 mg  4 mg Oral Q4HRS PRN Yunior Villalobos M.D.       • promethazine (PHENERGAN) tablet 12.5-25 mg  12.5-25 mg Oral Q4HRS PRN Yunior Villalobos M.D.       • promethazine (PHENERGAN) suppository 12.5-25 mg  12.5-25 mg Rectal Q4HRS PRN Yunior Villalobos M.D.       • prochlorperazine (COMPAZINE) injection 5-10 mg  5-10 mg Intravenous Q4HRS PRN Yunior Villalobos M.D.       • MD Alert...ICU Electrolyte Replacement per Pharmacy   Other pharmacy to dose Kei Curiel M.D.       • enoxaparin (LOVENOX) inj 40 mg  40 mg Subcutaneous DAILY Kei Curiel M.D.   Stopped at 10/19/18 0600   • aspirin (ASA) tablet 325 mg  325 mg Oral DAILY Yunior Villalobos M.D.   325 mg at 10/20/18 0613   • atorvastatin (LIPITOR) tablet 40 mg  40 mg Oral Q EVENING Yunior Villalobos M.D.   40 mg at 10/19/18 1707       Fluids    Intake/Output Summary (Last 24 hours) at 10/20/18 0721  Last data filed at 10/20/18 0600   Gross per 24 hour   Intake             1080 ml   Output                0 ml   Net             1080 ml       Laboratory  Recent Results (from the past 48 hour(s))   LDH    Collection Time: 10/18/18  9:00 AM   Result Value Ref Range    LDH Total 666 (H) 107 - 266 U/L   REFERENCE MISC.REFRIG 1    Collection Time: 10/18/18  9:06 AM   Result Value Ref Range    Misc. Lab Rslt SEE NOTE    CBC with Differential    Collection Time: 10/19/18  4:30 AM   Result Value Ref Range    WBC 5.1 4.8 - 10.8 K/uL    RBC 4.07 (L) 4.20 - 5.40 M/uL    Hemoglobin 8.4 (L) 12.0 - 16.0 g/dL    Hematocrit 26.8 (L) 37.0 - 47.0 %    MCV 65.8 (L) 81.4 - 97.8 fL    MCH 20.6 (L) 27.0 - 33.0 pg    MCHC 31.3 (L) 33.6 - 35.0 g/dL    RDW 38.5 35.9 - 50.0 fL    Platelet Count 133 (L) 164 - 446 K/uL    MPV 9.7 9.0 - 12.9 fL    Neutrophils-Polys 81.20 (H) 44.00 - 72.00 %    Lymphocytes 13.90 (L) 22.00 - 41.00 %    Monocytes 2.70 0.00 - 13.40 %    Eosinophils 1.40 0.00 -  6.90 %    Basophils 0.20 0.00 - 1.80 %    Immature Granulocytes 0.60 0.00 - 0.90 %    Nucleated RBC 0.40 /100 WBC    Neutrophils (Absolute) 4.16 2.00 - 7.15 K/uL    Lymphs (Absolute) 0.71 (L) 1.00 - 4.80 K/uL    Monos (Absolute) 0.14 0.00 - 0.85 K/uL    Eos (Absolute) 0.07 0.00 - 0.51 K/uL    Baso (Absolute) 0.01 0.00 - 0.12 K/uL    Immature Granulocytes (abs) 0.03 0.00 - 0.11 K/uL    NRBC (Absolute) 0.02 K/uL   Basic Metabolic Panel (BMP)    Collection Time: 10/19/18  4:30 AM   Result Value Ref Range    Sodium 137 135 - 145 mmol/L    Potassium 4.2 3.6 - 5.5 mmol/L    Chloride 101 96 - 112 mmol/L    Co2 31 20 - 33 mmol/L    Glucose 104 (H) 65 - 99 mg/dL    Bun 10 8 - 22 mg/dL    Creatinine 0.55 0.50 - 1.40 mg/dL    Calcium 8.6 8.5 - 10.5 mg/dL    Anion Gap 5.0 0.0 - 11.9   Magnesium    Collection Time: 10/19/18  4:30 AM   Result Value Ref Range    Magnesium 1.9 1.5 - 2.5 mg/dL   Phosphorus    Collection Time: 10/19/18  4:30 AM   Result Value Ref Range    Phosphorus 3.7 2.5 - 4.5 mg/dL   BTYPE NATRIURETIC PEPTIDE    Collection Time: 10/19/18  4:30 AM   Result Value Ref Range    B Natriuretic Peptide 80 0 - 100 pg/mL   ESTIMATED GFR    Collection Time: 10/19/18  4:30 AM   Result Value Ref Range    GFR If African American >60 >60 mL/min/1.73 m 2    GFR If Non African American >60 >60 mL/min/1.73 m 2   CBC with Differential    Collection Time: 10/20/18  4:00 AM   Result Value Ref Range    WBC 5.2 4.8 - 10.8 K/uL    RBC 4.31 4.20 - 5.40 M/uL    Hemoglobin 8.9 (L) 12.0 - 16.0 g/dL    Hematocrit 28.7 (L) 37.0 - 47.0 %    MCV 66.6 (L) 81.4 - 97.8 fL    MCH 20.6 (L) 27.0 - 33.0 pg    MCHC 31.0 (L) 33.6 - 35.0 g/dL    RDW 39.0 35.9 - 50.0 fL    Platelet Count 148 (L) 164 - 446 K/uL    MPV 9.8 9.0 - 12.9 fL    Neutrophils-Polys 67.50 44.00 - 72.00 %    Lymphocytes 27.70 22.00 - 41.00 %    Monocytes 1.50 0.00 - 13.40 %    Eosinophils 2.10 0.00 - 6.90 %    Basophils 0.20 0.00 - 1.80 %    Immature Granulocytes 1.00 (H) 0.00 -  0.90 %    Nucleated RBC 0.40 /100 WBC    Neutrophils (Absolute) 3.49 2.00 - 7.15 K/uL    Lymphs (Absolute) 1.43 1.00 - 4.80 K/uL    Monos (Absolute) 0.08 0.00 - 0.85 K/uL    Eos (Absolute) 0.11 0.00 - 0.51 K/uL    Baso (Absolute) 0.01 0.00 - 0.12 K/uL    Immature Granulocytes (abs) 0.05 0.00 - 0.11 K/uL    NRBC (Absolute) 0.02 K/uL   Basic Metabolic Panel (BMP)    Collection Time: 10/20/18  4:00 AM   Result Value Ref Range    Sodium 140 135 - 145 mmol/L    Potassium 3.5 (L) 3.6 - 5.5 mmol/L    Chloride 102 96 - 112 mmol/L    Co2 31 20 - 33 mmol/L    Glucose 91 65 - 99 mg/dL    Bun 11 8 - 22 mg/dL    Creatinine 0.63 0.50 - 1.40 mg/dL    Calcium 8.6 8.5 - 10.5 mg/dL    Anion Gap 7.0 0.0 - 11.9   BTYPE NATRIURETIC PEPTIDE    Collection Time: 10/20/18  4:00 AM   Result Value Ref Range    B Natriuretic Peptide 25 0 - 100 pg/mL   ESTIMATED GFR    Collection Time: 10/20/18  4:00 AM   Result Value Ref Range    GFR If African American >60 >60 mL/min/1.73 m 2    GFR If Non African American >60 >60 mL/min/1.73 m 2       Imaging  X-Ray:  I have personally reviewed the images and compared with prior images.    Assessment/Plan  Severe sepsis (HCC)- (present on admission)   Assessment & Plan    Significantly hypoxemic on presentation, suspect pulmonary source  She has been on 1 month of steroids at high doses 40 mg daily.    She is not on prophylaxis for pcp, LDH elevated, sputum for PCP pending  Complete course of levofloxacin  Course of diuresis  PCT low for what it is worth          Shock (HCC)   Assessment & Plan    This is likely septic shock, improved          Elevated troponin- (present on admission)   Assessment & Plan    This is likely demand ischemia, secondary to hypoxic respiratory failure.  Chest pain-free  Echocardiogram reviewed  Cardiology following, meds reviewed -aspirin/statin, holding beta blocker ACE due to hypotension          Leukocytosis- (present on admission)   Assessment & Plan    Treating for pneumonia,  follow        Acute respiratory failure with hypoxia (HCC)- (present on admission)   Assessment & Plan    Suspect severe COPD, asthma exacerbation  Likely hypoxic respiratory failure stressed heart causing congestive heart failure, type II MI  BNP is normal.  Echo with preserved EF 55%  Taper dose of corticosteroids  RT O2 protocol  Scheduled bronchodilators  Hold home inhalers as patient unlikely be able to use at this time                 VTE:  Lovenox  Ulcer: PPI  Lines: None    I have performed a physical exam and reviewed and updated ROS and Plan today (10/20/2018). In review of yesterday's note (10/19/2018), there are no changes except as documented above.     Discussed patient condition and risk of morbidity and/or mortality with Hospitalist, RN, RT and QA team

## 2018-10-21 ENCOUNTER — APPOINTMENT (OUTPATIENT)
Dept: RADIOLOGY | Facility: MEDICAL CENTER | Age: 43
DRG: 871 | End: 2018-10-21
Attending: INTERNAL MEDICINE
Payer: MEDICAID

## 2018-10-21 VITALS
TEMPERATURE: 97.9 F | HEIGHT: 68 IN | WEIGHT: 150.79 LBS | DIASTOLIC BLOOD PRESSURE: 63 MMHG | BODY MASS INDEX: 22.85 KG/M2 | HEART RATE: 66 BPM | OXYGEN SATURATION: 95 % | RESPIRATION RATE: 21 BRPM | SYSTOLIC BLOOD PRESSURE: 99 MMHG

## 2018-10-21 LAB
ANION GAP SERPL CALC-SCNC: 7 MMOL/L (ref 0–11.9)
BACTERIA BLD CULT: NORMAL
BACTERIA BLD CULT: NORMAL
BASOPHILS # BLD AUTO: 0 % (ref 0–1.8)
BASOPHILS # BLD: 0 K/UL (ref 0–0.12)
BNP SERPL-MCNC: 13 PG/ML (ref 0–100)
BUN SERPL-MCNC: 16 MG/DL (ref 8–22)
CALCIUM SERPL-MCNC: 9 MG/DL (ref 8.5–10.5)
CHLORIDE SERPL-SCNC: 99 MMOL/L (ref 96–112)
CO2 SERPL-SCNC: 33 MMOL/L (ref 20–33)
CREAT SERPL-MCNC: 0.74 MG/DL (ref 0.5–1.4)
EOSINOPHIL # BLD AUTO: 0.04 K/UL (ref 0–0.51)
EOSINOPHIL NFR BLD: 0.9 % (ref 0–6.9)
ERYTHROCYTE [DISTWIDTH] IN BLOOD BY AUTOMATED COUNT: 37.2 FL (ref 35.9–50)
GLUCOSE SERPL-MCNC: 102 MG/DL (ref 65–99)
HCT VFR BLD AUTO: 31.1 % (ref 37–47)
HGB BLD-MCNC: 10.1 G/DL (ref 12–16)
HYPOCHROMIA BLD QL SMEAR: ABNORMAL
LG PLATELETS BLD QL SMEAR: NORMAL
LYMPHOCYTES # BLD AUTO: 1.95 K/UL (ref 1–4.8)
LYMPHOCYTES NFR BLD: 42.3 % (ref 22–41)
MAGNESIUM SERPL-MCNC: 1.8 MG/DL (ref 1.5–2.5)
MANUAL DIFF BLD: NORMAL
MCH RBC QN AUTO: 21.2 PG (ref 27–33)
MCHC RBC AUTO-ENTMCNC: 32.5 G/DL (ref 33.6–35)
MCV RBC AUTO: 65.3 FL (ref 81.4–97.8)
MICROCYTES BLD QL SMEAR: ABNORMAL
MONOCYTES # BLD AUTO: 0 K/UL (ref 0–0.85)
MONOCYTES NFR BLD AUTO: 0 % (ref 0–13.4)
MORPHOLOGY BLD-IMP: NORMAL
NEUTROPHILS # BLD AUTO: 2.61 K/UL (ref 2–7.15)
NEUTROPHILS NFR BLD: 56.8 % (ref 44–72)
NRBC # BLD AUTO: 0 K/UL
NRBC BLD-RTO: 0 /100 WBC
PHOSPHATE SERPL-MCNC: 3.6 MG/DL (ref 2.5–4.5)
PLATELET # BLD AUTO: 149 K/UL (ref 164–446)
PLATELET BLD QL SMEAR: NORMAL
PMV BLD AUTO: 9.7 FL (ref 9–12.9)
POIKILOCYTOSIS BLD QL SMEAR: NORMAL
POLYCHROMASIA BLD QL SMEAR: NORMAL
POTASSIUM SERPL-SCNC: 4.3 MMOL/L (ref 3.6–5.5)
RBC # BLD AUTO: 4.76 M/UL (ref 4.2–5.4)
RBC BLD AUTO: PRESENT
SIGNIFICANT IND 70042: NORMAL
SIGNIFICANT IND 70042: NORMAL
SITE SITE: NORMAL
SITE SITE: NORMAL
SODIUM SERPL-SCNC: 139 MMOL/L (ref 135–145)
SOURCE SOURCE: NORMAL
SOURCE SOURCE: NORMAL
TARGETS BLD QL SMEAR: NORMAL
TOXIC GRANULES BLD QL SMEAR: SLIGHT
WBC # BLD AUTO: 4.6 K/UL (ref 4.8–10.8)

## 2018-10-21 PROCEDURE — 80048 BASIC METABOLIC PNL TOTAL CA: CPT

## 2018-10-21 PROCEDURE — 700102 HCHG RX REV CODE 250 W/ 637 OVERRIDE(OP): Performed by: INTERNAL MEDICINE

## 2018-10-21 PROCEDURE — 85027 COMPLETE CBC AUTOMATED: CPT

## 2018-10-21 PROCEDURE — 94640 AIRWAY INHALATION TREATMENT: CPT

## 2018-10-21 PROCEDURE — 700101 HCHG RX REV CODE 250: Performed by: INTERNAL MEDICINE

## 2018-10-21 PROCEDURE — A9270 NON-COVERED ITEM OR SERVICE: HCPCS | Performed by: HOSPITALIST

## 2018-10-21 PROCEDURE — A9270 NON-COVERED ITEM OR SERVICE: HCPCS | Performed by: INTERNAL MEDICINE

## 2018-10-21 PROCEDURE — 83735 ASSAY OF MAGNESIUM: CPT

## 2018-10-21 PROCEDURE — 700111 HCHG RX REV CODE 636 W/ 250 OVERRIDE (IP): Performed by: HOSPITALIST

## 2018-10-21 PROCEDURE — 71045 X-RAY EXAM CHEST 1 VIEW: CPT

## 2018-10-21 PROCEDURE — 83880 ASSAY OF NATRIURETIC PEPTIDE: CPT

## 2018-10-21 PROCEDURE — 94760 N-INVAS EAR/PLS OXIMETRY 1: CPT

## 2018-10-21 PROCEDURE — 99239 HOSP IP/OBS DSCHRG MGMT >30: CPT | Performed by: HOSPITALIST

## 2018-10-21 PROCEDURE — 700111 HCHG RX REV CODE 636 W/ 250 OVERRIDE (IP): Performed by: INTERNAL MEDICINE

## 2018-10-21 PROCEDURE — 84100 ASSAY OF PHOSPHORUS: CPT

## 2018-10-21 PROCEDURE — 700102 HCHG RX REV CODE 250 W/ 637 OVERRIDE(OP): Performed by: HOSPITALIST

## 2018-10-21 PROCEDURE — 85007 BL SMEAR W/DIFF WBC COUNT: CPT

## 2018-10-21 RX ORDER — MAGNESIUM SULFATE HEPTAHYDRATE 40 MG/ML
2 INJECTION, SOLUTION INTRAVENOUS ONCE
Status: COMPLETED | OUTPATIENT
Start: 2018-10-21 | End: 2018-10-21

## 2018-10-21 RX ORDER — GUAIFENESIN 600 MG/1
600 TABLET, EXTENDED RELEASE ORAL EVERY 12 HOURS
Qty: 20 TAB | Refills: 0 | Status: SHIPPED | OUTPATIENT
Start: 2018-10-21 | End: 2018-10-24

## 2018-10-21 RX ORDER — LEVOFLOXACIN 750 MG/1
750 TABLET, FILM COATED ORAL DAILY
Qty: 3 TAB | Refills: 0 | Status: SHIPPED | OUTPATIENT
Start: 2018-10-22 | End: 2018-10-24

## 2018-10-21 RX ORDER — BUDESONIDE AND FORMOTEROL FUMARATE DIHYDRATE 160; 4.5 UG/1; UG/1
2 AEROSOL RESPIRATORY (INHALATION)
Status: DISCONTINUED | OUTPATIENT
Start: 2018-10-21 | End: 2018-10-21 | Stop reason: HOSPADM

## 2018-10-21 RX ORDER — ATORVASTATIN CALCIUM 40 MG/1
40 TABLET, FILM COATED ORAL EVERY EVENING
Qty: 30 TAB | Refills: 1 | Status: SHIPPED | OUTPATIENT
Start: 2018-10-21 | End: 2019-08-09

## 2018-10-21 RX ORDER — ALBUTEROL SULFATE 90 UG/1
2 AEROSOL, METERED RESPIRATORY (INHALATION) EVERY 4 HOURS PRN
Qty: 1 INHALER | Refills: 0 | Status: ON HOLD | OUTPATIENT
Start: 2018-10-21 | End: 2021-02-26 | Stop reason: SDUPTHER

## 2018-10-21 RX ORDER — PREDNISONE 20 MG/1
TABLET ORAL
Qty: 100 TAB | Refills: 0 | Status: SHIPPED | OUTPATIENT
Start: 2018-10-22 | End: 2018-11-03 | Stop reason: CLARIF

## 2018-10-21 RX ADMIN — BUDESONIDE AND FORMOTEROL FUMARATE DIHYDRATE 2 PUFF: 160; 4.5 AEROSOL RESPIRATORY (INHALATION) at 11:58

## 2018-10-21 RX ADMIN — GABAPENTIN 600 MG: 300 CAPSULE ORAL at 11:59

## 2018-10-21 RX ADMIN — GUAIFENESIN 600 MG: 600 TABLET, EXTENDED RELEASE ORAL at 04:48

## 2018-10-21 RX ADMIN — OXYCODONE HYDROCHLORIDE 5 MG: 5 TABLET ORAL at 13:12

## 2018-10-21 RX ADMIN — FUROSEMIDE 20 MG: 10 INJECTION, SOLUTION INTRAMUSCULAR; INTRAVENOUS at 04:46

## 2018-10-21 RX ADMIN — ALPRAZOLAM 1 MG: 1 TABLET ORAL at 07:10

## 2018-10-21 RX ADMIN — PREDNISONE 60 MG: 20 TABLET ORAL at 04:47

## 2018-10-21 RX ADMIN — GABAPENTIN 600 MG: 300 CAPSULE ORAL at 04:47

## 2018-10-21 RX ADMIN — LEVOFLOXACIN 750 MG: 750 TABLET, FILM COATED ORAL at 04:46

## 2018-10-21 RX ADMIN — MAGNESIUM SULFATE IN WATER 2 G: 40 INJECTION, SOLUTION INTRAVENOUS at 09:46

## 2018-10-21 RX ADMIN — IPRATROPIUM BROMIDE AND ALBUTEROL SULFATE 3 ML: .5; 3 SOLUTION RESPIRATORY (INHALATION) at 14:18

## 2018-10-21 RX ADMIN — SALINE NASAL SPRAY 2 SPRAY: 1.5 SOLUTION NASAL at 07:22

## 2018-10-21 RX ADMIN — IPRATROPIUM BROMIDE AND ALBUTEROL SULFATE 3 ML: .5; 3 SOLUTION RESPIRATORY (INHALATION) at 07:57

## 2018-10-21 RX ADMIN — SENNOSIDES AND DOCUSATE SODIUM 2 TABLET: 8.6; 5 TABLET ORAL at 04:48

## 2018-10-21 RX ADMIN — ALPRAZOLAM 1 MG: 1 TABLET ORAL at 11:59

## 2018-10-21 RX ADMIN — ASPIRIN 325 MG: 325 TABLET, COATED ORAL at 04:48

## 2018-10-21 RX ADMIN — OXYCODONE HYDROCHLORIDE 5 MG: 5 TABLET ORAL at 08:33

## 2018-10-21 RX ADMIN — OMEPRAZOLE 20 MG: 20 CAPSULE, DELAYED RELEASE ORAL at 04:47

## 2018-10-21 RX ADMIN — CLOTRIMAZOLE 10 MG: 10 LOZENGE ORAL; TOPICAL at 07:00

## 2018-10-21 RX ADMIN — LEVOTHYROXINE SODIUM 62.5 MCG: 0.12 TABLET ORAL at 04:46

## 2018-10-21 RX ADMIN — CLOTRIMAZOLE 10 MG: 10 LOZENGE ORAL; TOPICAL at 11:59

## 2018-10-21 ASSESSMENT — ENCOUNTER SYMPTOMS
VOMITING: 0
SPUTUM PRODUCTION: 1
HEADACHES: 0
MUSCULOSKELETAL NEGATIVE: 1
FEVER: 1
FOCAL WEAKNESS: 0
POLYDIPSIA: 0
PALPITATIONS: 0
DEPRESSION: 0
SHORTNESS OF BREATH: 1
NECK PAIN: 0
BRUISES/BLEEDS EASILY: 0
HEARTBURN: 0
COUGH: 1
NAUSEA: 0
DIZZINESS: 0
NERVOUS/ANXIOUS: 1
CHILLS: 1
WEAKNESS: 1
GASTROINTESTINAL NEGATIVE: 1
BACK PAIN: 0
BLURRED VISION: 1
CARDIOVASCULAR NEGATIVE: 1

## 2018-10-21 ASSESSMENT — PAIN SCALES - GENERAL
PAINLEVEL_OUTOF10: 4
PAINLEVEL_OUTOF10: 3
PAINLEVEL_OUTOF10: 0
PAINLEVEL_OUTOF10: 8
PAINLEVEL_OUTOF10: 9

## 2018-10-21 NOTE — PROGRESS NOTES
[facility] Hospitalist Progress Note    Date of Service: 10/21/2018    Chief Complaint  43 y.o. female admitted 10/16/2018 with a history of COPD, tobacco abuse, question of asthma, presenting with shortness of breath, cough, chills, diagnosed with community-acquired pneumonia and respiratory failure, placed on BiPAP in the ER    Interval Problem Update  Patient seen and examined today. ICU Care  Care and plan discussed in IDT/Hot rounds.  Lines and assistive devices reviewed.    Patient tolerating treatment and therapies.  All Data, Medication data reviewed.  Case discussed with nursing as available.  Plan of Care reviewed with patient and notified of changes.    10/17 the patient is improved, came off BiPAP around 11:00 PM, no further increase in respiratory complaints, wet cough, patient wants to eat, manifestation of Graves' disease, she denies any other sick contact or recent travel out of state  10/18 the patient feels better, she wants her Bird discontinued, she ambulated some, requires 8-9 L per oxygen mask, improved cough, improved chest exam  10/19 the patient feels better, afebrile, able to ambulate, on 8 L overnight per nasal cannula and mask she complains of a dry nose, remains on antibiotics, respiratory culture is pending, had a bowel movement, occult blood currently pending  10/20 patient is improving, blood pressure is on the low side, no symptoms though, the patient states that she always is low, she ambulates, she is on 4 L per nasal cannula, chest x-ray is improving, tolerating mild diuresis  10/21 patient is improving, she is not saturating well on room air though, so she will need home oxygen, her hemodynamics have improved, no fevers no chills.  The patient has very little sputum, her chest exam is improved  Consultants/Specialty  Pulmonary    Disposition  TBD, home once home oxygen is approved        Review of Systems   Constitutional: Positive for chills, fever and malaise/fatigue.   HENT:  Negative.    Eyes: Positive for blurred vision.   Respiratory: Positive for cough, sputum production and shortness of breath.    Cardiovascular: Negative.  Negative for chest pain and palpitations.   Gastrointestinal: Negative.  Negative for heartburn, nausea and vomiting.   Genitourinary: Negative.  Negative for dysuria and frequency.   Musculoskeletal: Negative.  Negative for back pain and neck pain.   Skin: Negative.  Negative for itching and rash.   Neurological: Positive for weakness. Negative for dizziness, focal weakness and headaches.   Endo/Heme/Allergies: Negative.  Negative for polydipsia. Does not bruise/bleed easily.   Psychiatric/Behavioral: Negative for depression. The patient is nervous/anxious.       Physical Exam  Laboratory/Imaging   Hemodynamics  Temp (24hrs), Av.7 °C (98.1 °F), Min:36.5 °C (97.7 °F), Max:36.8 °C (98.2 °F)   Temperature: 36.7 °C (98.1 °F)  Pulse  Av.5  Min: 57  Max: 124    NIBP: (!) 82/56      Respiratory      Respiration: 16, Pulse Oximetry: 92 %, O2 Daily Delivery Respiratory : Silicone Nasal Cannula     Given By:: Mouthpiece, Work Of Breathing / Effort: Mild  RUL Breath Sounds: Clear, RML Breath Sounds: Clear, RLL Breath Sounds: Diminished, BETH Breath Sounds: Clear, LLL Breath Sounds: Diminished    Fluids    Intake/Output Summary (Last 24 hours) at 10/21/18 0737  Last data filed at 10/20/18 0800   Gross per 24 hour   Intake              360 ml   Output                0 ml   Net              360 ml       Nutrition  Orders Placed This Encounter   Procedures   • Diet Order Regular     Standing Status:   Standing     Number of Occurrences:   1     Order Specific Question:   Diet:     Answer:   Regular [1]     Physical Exam   Constitutional: She is oriented to person, place, and time. She appears well-developed and well-nourished.   HENT:   Head: Normocephalic and atraumatic.   Nose: Nose normal.   Mouth/Throat: Oropharynx is clear and moist.   Eyes: Pupils are equal,  round, and reactive to light. Conjunctivae and EOM are normal.   Bilateral proptosis   Neck: Normal range of motion. Neck supple. No JVD present. No thyromegaly present.   Cardiovascular: Normal rate, regular rhythm and normal heart sounds.  Exam reveals no gallop and no friction rub.    Pulses:       Dorsalis pedis pulses are 2+ on the right side, and 2+ on the left side.   Capillary refill <3 secs   Pulmonary/Chest: Effort normal. She has decreased breath sounds. She has no wheezes. She has rhonchi. She has rales.   Abdominal: Soft. Bowel sounds are normal. She exhibits no distension and no mass. There is no tenderness. There is no rebound and no guarding.   Musculoskeletal: Normal range of motion. She exhibits no edema or tenderness.   Lymphadenopathy:     She has no cervical adenopathy.   Neurological: She is alert and oriented to person, place, and time. No cranial nerve deficit.   Skin: Skin is warm and dry. She is not diaphoretic. No cyanosis.   Psychiatric: She has a normal mood and affect. Her behavior is normal.   Nursing note and vitals reviewed.      Recent Labs      10/19/18   0430  10/20/18   0400  10/21/18   0455   WBC  5.1  5.2  4.6*   RBC  4.07*  4.31  4.76   HEMOGLOBIN  8.4*  8.9*  10.1*   HEMATOCRIT  26.8*  28.7*  31.1*   MCV  65.8*  66.6*  65.3*   MCH  20.6*  20.6*  21.2*   MCHC  31.3*  31.0*  32.5*   RDW  38.5  39.0  37.2   PLATELETCT  133*  148*  149*   MPV  9.7  9.8  9.7     Recent Labs      10/19/18   0430  10/20/18   0400  10/21/18   0455   SODIUM  137  140  139   POTASSIUM  4.2  3.5*  4.3   CHLORIDE  101  102  99   CO2  31  31  33   GLUCOSE  104*  91  102*   BUN  10  11  16   CREATININE  0.55  0.63  0.74   CALCIUM  8.6  8.6  9.0         Recent Labs      10/19/18   0430  10/20/18   0400  10/21/18   0455   BNPBTYPENAT  80  25  13              Assessment/Plan     Severe sepsis (HCC)- (present on admission)   Assessment & Plan    This is severe sepsis with the following associated acute organ  dysfunction(s): acute respiratory failure.   Related to asthma exacerbation/community-acquired pneumonia  Sepsis protocol  IV fluid  Follow culture  Antibiotic with IV levofloxacin(allergic to penicillin and cephalosporin)        Anemia   Assessment & Plan    With a history of reported thalassemia  Microcytosis  Currently positive occult blood  Was on high-dose steroids  Question of upper GI irritation  H&H stable        Tobacco abuse   Assessment & Plan    Nicotine replacement protocol        Graves disease   Assessment & Plan    With significant ophthalmic findings  Placed on high-dose steroids on outpatient basis        Elevated troponin- (present on admission)   Assessment & Plan    With chest pain  Likely related to demand ischemia?  Cardiology consulted  Check echo and trend trop        Leukocytosis- (present on admission)   Assessment & Plan    Related to above        Acute respiratory failure with hypoxia (HCC)- (present on admission)   Assessment & Plan    Related to asthma/COPD exacerbation  Initially on BiPAP, weaned off  Intensivist/pulmonary consulted  Antibiotics  Steroids, the patient was on high-dose prednisone and outpatient basis  Aggressive breathing treatment, steroid, RT protocol          Quality-Core Measures   Reviewed items::  Radiology images reviewed, EKG reviewed, Labs reviewed and Medications reviewed  Bird catheter::  No Bird  DVT prophylaxis pharmacological::  Enoxaparin (Lovenox)  DVT prophylaxis - mechanical:  SCDs  Antibiotics:  Treating active infection/contamination beyond 24 hours perioperative coverage  Assessed for rehabilitation services:  Patient was assess for and/or received rehabilitation services during this hospitalization      Plan  Home oxygen set up  We will send home with albuterol and Symbicort  To stay on her home PPI  To follow-up with a primary care physician to follow on her anemia  Overall much improved  Will complete a 7-day course of antibiotics  Improved  for downgrade to medical versus home

## 2018-10-21 NOTE — FACE TO FACE
Face to Face Note  -  Durable Medical Equipment    Rodolfo Stinson M.D. - NPI: [id number]  I certify that this patient is under my care and that they had a durable medical equipment(DME)face to face encounter by myself that meets the physician DME face-to-face encounter requirements with this patient on:    Date of encounter:   Patient:                    MRN:                       YOB: 2017  Madelny Robb  [id number]  1975     The encounter with the patient was in whole, or in part, for the following medical condition, which is the primary reason for durable medical equipment:  Asthma    I certify that, based on my findings, the following durable medical equipment is medically necessary:  Oxygen.    HOME O2 Saturation Measurements:(Values must be present for Home Oxygen orders)  Room air sat at rest: 90  Room air sat with amb: 82  With liters of O2: 3, O2 sat at rest with O2: 97  With Liters of O2: 3, O2 sat with amb with O2 : 92  Is the patient mobile?: Yes    My Clinical findings support the need for the above equipment due to:  Hypoxia    Supporting Symptoms: Hypoxia, desaturation on RA

## 2018-10-21 NOTE — PROGRESS NOTES
Received report by phone from previous nurse. Patient arrived on the unit. Tolerated well. States pain 9/10 (lower back - radiates to the ribs). Denies nausea. Will review orders and continue care. NICOLÁSTM

## 2018-10-21 NOTE — DISCHARGE INSTRUCTIONS
Discharge Instructions    Discharged to home by car with relative. Discharged via wheelchair, hospital escort: Yes.  Special equipment needed:     Be sure to schedule a follow-up appointment with your primary care doctor or any specialists as instructed.     Discharge Plan:   Diet Plan: Discussed  Activity Level: Discussed  Smoking Cessation Offered: Patient Counseled  Confirmed Follow up Appointment: Appointment Scheduled  Confirmed Symptoms Management: Discussed  Medication Reconciliation Updated: Yes  Influenza Vaccine Indication: Not indicated: Previously immunized this influenza season and > 8 years of age    I understand that a diet low in cholesterol, fat, and sodium is recommended for good health. Unless I have been given specific instructions below for another diet, I accept this instruction as my diet prescription.   Other diet:     Special Instructions:     · Is patient discharged on Warfarin / Coumadin?   No     Depression / Suicide Risk    As you are discharged from this [facility] facility, it is important to learn how to keep safe from harming yourself.    Recognize the warning signs:  · Abrupt changes in personality, positive or negative- including increase in energy   · Giving away possessions  · Change in eating patterns- significant weight changes-  positive or negative  · Change in sleeping patterns- unable to sleep or sleeping all the time   · Unwillingness or inability to communicate  · Depression  · Unusual sadness, discouragement and loneliness  · Talk of wanting to die  · Neglect of personal appearance   · Rebelliousness- reckless behavior  · Withdrawal from people/activities they love  · Confusion- inability to concentrate     If you or a loved one observes any of these behaviors or has concerns about self-harm, here's what you can do:  · Talk about it- your feelings and reasons for harming yourself  · Remove any means that you might use to hurt yourself (examples: pills, rope, extension  cords, firearm)  · Get professional help from the community (Mental Health, Substance Abuse, psychological counseling)  · Do not be alone:Call your Safe Contact- someone whom you trust who will be there for you.  · Call your local CRISIS HOTLINE 488-0542 or 644-594-5941  · Call your local Children's Mobile Crisis Response Team [place] (868) 572-1355 or www.[place]  · Call the toll free National Suicide Prevention Hotlines   · National Suicide Prevention Lifeline 590-712-CKYF (6231)  · National Hope Line Network 800-SUICIDE (674-6794)

## 2018-10-21 NOTE — DISCHARGE PLANNING
Agency/Facility Name: [facility]  Spoke To: Sandi  Outcome: They have accepted patient on service and will have equipment delivered by 1500.  JARON Gallegos advised.

## 2018-10-21 NOTE — PROGRESS NOTES
Removed central line. Patient tolerated well. Oxygen was delivered for patient to take home with them. Patient denies nausea. Pain tolerable.

## 2018-10-21 NOTE — DISCHARGE PLANNING
Anticipated Discharge Disposition: d/c home w/ O2    Action: Spoke to [facility]. f2f in place, and pt moved to S182.01 (neuro). Choice can be sent to [facility] and LSW for neuro should be notified.     LSw left VM w/ neuro assigned Sunday SW. LSW advised as above.     Barriers to Discharge: INS AUTH, delivery    Plan: new unit LSW to f/u w/ DME, medical team etc

## 2018-10-21 NOTE — DISCHARGE PLANNING
Received Choice form at [phone]  Agency/Facility Name: [facility]  Referral sent per Choice form @ [phone]

## 2018-10-21 NOTE — DISCHARGE PLANNING
Anticipated Discharge Disposition: d/c home w/ O2    Action: Spoke to bedside RN and charge. Pt to d/c home today w/ O2. Pt will also change floors due to tight bed census. Hospitalist in rounds, and will complete choice/f2f.     LSw spoke to pt and SO at bedside. Pt signed for O2 w/ [facility]. She lives in [place] and carries [place] Medicaid INS.    LSw faxed completed choice to [facility]. LSw called Michelle and left VM as above.    Barriers to Discharge: TBD    Plan: f/u w/ [facility], medical team

## 2018-10-21 NOTE — PROGRESS NOTES
Pt's resting room air SpO2 at 90%, patient ambulated approximately 300ft and denies any complaints of short of breath, lightheadedness or dizziness.  SpO2 dipped to 80%, 3L O2 applied and patient's SpO2 recovered to 95% within a minute.  Pt. In no acute distress,will continue to monitor.

## 2018-10-21 NOTE — CARE PLAN
Problem: Infection  Goal: Will remain free from infection  Outcome: PROGRESSING AS EXPECTED  Discontinued the central line. Patient tolerated well. Tip was intact. Educated the patient regarding hand hygiene. Keep site where Central line placed covered for 24 hours.      Problem: Pain Management  Goal: Pain level will decrease to patient's comfort goal  Patient experiencing pain in lower back that radiates to her ribs due to pna. Pain Medication given.
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
## 2018-10-24 ENCOUNTER — OFFICE VISIT (OUTPATIENT)
Dept: CARDIOLOGY | Facility: MEDICAL CENTER | Age: 43
End: 2018-10-24
Payer: MEDICAID

## 2018-10-24 VITALS
DIASTOLIC BLOOD PRESSURE: 58 MMHG | SYSTOLIC BLOOD PRESSURE: 86 MMHG | BODY MASS INDEX: 23.49 KG/M2 | HEIGHT: 68 IN | WEIGHT: 155 LBS | RESPIRATION RATE: 14 BRPM | HEART RATE: 78 BPM | OXYGEN SATURATION: 96 %

## 2018-10-24 DIAGNOSIS — R07.89 OTHER CHEST PAIN: ICD-10-CM

## 2018-10-24 DIAGNOSIS — R06.02 SHORTNESS OF BREATH: ICD-10-CM

## 2018-10-24 DIAGNOSIS — I21.4 NON-ST ELEVATION MYOCARDIAL INFARCTION (NSTEMI), SUBSEQUENT EPISODE OF CARE (HCC): ICD-10-CM

## 2018-10-24 DIAGNOSIS — I27.20 PULMONARY HYPERTENSION (HCC): ICD-10-CM

## 2018-10-24 PROCEDURE — 99214 OFFICE O/P EST MOD 30 MIN: CPT | Performed by: NURSE PRACTITIONER

## 2018-10-24 RX ORDER — ALBUTEROL SULFATE 2.5 MG/3ML
2.5 SOLUTION RESPIRATORY (INHALATION) EVERY 4 HOURS PRN
Status: ON HOLD | COMMUNITY
Start: 2018-10-22 | End: 2021-02-26 | Stop reason: SDUPTHER

## 2018-10-24 RX ORDER — BLOOD PRESSURE TEST KIT
1 KIT MISCELLANEOUS ONCE
Qty: 1 KIT | Refills: 0 | Status: SHIPPED | OUTPATIENT
Start: 2018-10-24 | End: 2018-10-24

## 2018-10-24 ASSESSMENT — ENCOUNTER SYMPTOMS
ORTHOPNEA: 0
DIZZINESS: 0
COUGH: 1
WEIGHT LOSS: 0
PALPITATIONS: 0
WEAKNESS: 0
SHORTNESS OF BREATH: 1
CLAUDICATION: 0
PND: 0
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
## 2018-10-26 NOTE — DOCUMENTATION QUERY
"DOCUMENTATION QUERY    PROVIDERS: Please select “Cosign w/ note”to reply to query.      To better represent the severity of illness of your patient, please review the following information and exercise your independent professional judgment in responding to this query.     Per H&P, Severe sepsis with acute respiratory failure is documented. \"Shock, likely septic shock\" is documented beginning 10/16 Pulmonary consult and throughout Pulmonary   progress notes. Discharge Summary documents severe sepsis without mention of septic shock.      In order to assign the appropriate sepsis codes, we need to determine if shock was related to sepsis. Based upon the clinical findings, risk factors, and treatment, can shock be further specified?        Please select which applies:    • Septic shock  • Other type of shock (please specify)  • Hypotension without shock   • Other explanation of clinical findings (please document)  • Unable to determine      The medical record reflects the following:    Clinical Findings  H&P 10/16, Yunior Villalobos MD  Severe sepsis (HCC)- (present on admission)   Assessment & Plan     This is severe sepsis with the following associated acute organ dysfunction(s): acute respiratory failure.        PN 10/17 - 10/20 , Pulmonary    Shock (HCC)   Assessment & Plan     This is likely septic shock, improved         Discharge Summary 10/21, Rodolfo Stinson MD    DISCHARGE DIAGNOSES:  1.  Status post evaluation and treatment of her severe sepsis secondary to community-acquired pneumonia.  This is improved.    HOSPITAL COURSE:  Patient was admitted with sepsis, respiratory failure, a type 2 myocardial infarction with elevated troponin level.      Treatment  Sepsis protocol, IVF, Pulmonary consult, central line     Risk Factors  Severe sepsis with acute respiratory failure, Shock, lactic acidosis  Type 2 MI, COPD exacerbation with pneumonia     Location within medical record   H&P, Consult, PN, Discharge Summary  "     Thank you,    [email or web address]

## 2018-10-29 NOTE — ADDENDUM NOTE
Encounter addended by: Tyrone Franklin on: 10/29/2018  1:52 PM<BR>    Actions taken: Sign clinical note

## 2018-10-31 NOTE — ADDENDUM NOTE
Encounter addended by: Jos Sepulveda M.D. on: 10/31/2018  5:14 AM<BR>    Actions taken: Delete clinical note

## 2018-11-03 ENCOUNTER — APPOINTMENT (OUTPATIENT)
Dept: RADIOLOGY | Facility: MEDICAL CENTER | Age: 43
DRG: 193 | End: 2018-11-03
Attending: EMERGENCY MEDICINE
Payer: MEDICAID

## 2018-11-03 ENCOUNTER — HOSPITAL ENCOUNTER (INPATIENT)
Facility: MEDICAL CENTER | Age: 43
LOS: 2 days | DRG: 193 | End: 2018-11-05
Attending: EMERGENCY MEDICINE | Admitting: FAMILY MEDICINE
Payer: MEDICAID

## 2018-11-03 DIAGNOSIS — R07.2 PRECORDIAL PAIN: ICD-10-CM

## 2018-11-03 DIAGNOSIS — E05.00 GRAVES DISEASE: ICD-10-CM

## 2018-11-03 DIAGNOSIS — R79.89 ELEVATED TROPONIN: ICD-10-CM

## 2018-11-03 DIAGNOSIS — R06.09 DYSPNEA ON EXERTION: ICD-10-CM

## 2018-11-03 PROBLEM — R07.9 CHEST PAIN: Status: ACTIVE | Noted: 2018-11-03

## 2018-11-03 PROBLEM — J18.9 PNEUMONIA: Status: ACTIVE | Noted: 2018-11-03

## 2018-11-03 LAB
ALBUMIN SERPL BCP-MCNC: 3.7 G/DL (ref 3.2–4.9)
ALBUMIN/GLOB SERPL: 1.9 G/DL
ALP SERPL-CCNC: 79 U/L (ref 30–99)
ALT SERPL-CCNC: 13 U/L (ref 2–50)
ANION GAP SERPL CALC-SCNC: 8 MMOL/L (ref 0–11.9)
ANISOCYTOSIS BLD QL SMEAR: ABNORMAL
APTT PPP: 20.2 SEC (ref 24.7–36)
AST SERPL-CCNC: 15 U/L (ref 12–45)
BASOPHILS # BLD AUTO: 0 % (ref 0–1.8)
BASOPHILS # BLD: 0 K/UL (ref 0–0.12)
BILIRUB SERPL-MCNC: 0.5 MG/DL (ref 0.1–1.5)
BNP SERPL-MCNC: 24 PG/ML (ref 0–100)
BUN SERPL-MCNC: 13 MG/DL (ref 8–22)
CALCIUM SERPL-MCNC: 8.5 MG/DL (ref 8.5–10.5)
CHLORIDE SERPL-SCNC: 105 MMOL/L (ref 96–112)
CO2 SERPL-SCNC: 25 MMOL/L (ref 20–33)
CREAT SERPL-MCNC: 0.74 MG/DL (ref 0.5–1.4)
EKG IMPRESSION: NORMAL
EOSINOPHIL # BLD AUTO: 0 K/UL (ref 0–0.51)
EOSINOPHIL NFR BLD: 0 % (ref 0–6.9)
ERYTHROCYTE [DISTWIDTH] IN BLOOD BY AUTOMATED COUNT: 39 FL (ref 35.9–50)
GLOBULIN SER CALC-MCNC: 2 G/DL (ref 1.9–3.5)
GLUCOSE SERPL-MCNC: 81 MG/DL (ref 65–99)
HCT VFR BLD AUTO: 33.9 % (ref 37–47)
HGB BLD-MCNC: 10.2 G/DL (ref 12–16)
INR PPP: 0.99 (ref 0.87–1.13)
LIPASE SERPL-CCNC: 15 U/L (ref 11–82)
LYMPHOCYTES # BLD AUTO: 2.4 K/UL (ref 1–4.8)
LYMPHOCYTES NFR BLD: 20.9 % (ref 22–41)
MANUAL DIFF BLD: NORMAL
MCH RBC QN AUTO: 20.3 PG (ref 27–33)
MCHC RBC AUTO-ENTMCNC: 30.1 G/DL (ref 33.6–35)
MCV RBC AUTO: 67.5 FL (ref 81.4–97.8)
MICROCYTES BLD QL SMEAR: ABNORMAL
MONOCYTES # BLD AUTO: 0.1 K/UL (ref 0–0.85)
MONOCYTES NFR BLD AUTO: 0.9 % (ref 0–13.4)
MORPHOLOGY BLD-IMP: NORMAL
NEUTROPHILS # BLD AUTO: 8.99 K/UL (ref 2–7.15)
NEUTROPHILS NFR BLD: 78.2 % (ref 44–72)
NRBC # BLD AUTO: 0.02 K/UL
NRBC BLD-RTO: 0.2 /100 WBC
OVALOCYTES BLD QL SMEAR: NORMAL
PLATELET # BLD AUTO: 275 K/UL (ref 164–446)
PLATELET BLD QL SMEAR: NORMAL
PMV BLD AUTO: 9.1 FL (ref 9–12.9)
POIKILOCYTOSIS BLD QL SMEAR: NORMAL
POLYCHROMASIA BLD QL SMEAR: NORMAL
POTASSIUM SERPL-SCNC: 3.8 MMOL/L (ref 3.6–5.5)
PROT SERPL-MCNC: 5.7 G/DL (ref 6–8.2)
PROTHROMBIN TIME: 13.2 SEC (ref 12–14.6)
RBC # BLD AUTO: 5.02 M/UL (ref 4.2–5.4)
RBC BLD AUTO: PRESENT
SODIUM SERPL-SCNC: 138 MMOL/L (ref 135–145)
TROPONIN I SERPL-MCNC: 0.05 NG/ML (ref 0–0.04)
TROPONIN I SERPL-MCNC: 0.06 NG/ML (ref 0–0.04)
WBC # BLD AUTO: 11.5 K/UL (ref 4.8–10.8)

## 2018-11-03 PROCEDURE — 96365 THER/PROPH/DIAG IV INF INIT: CPT

## 2018-11-03 PROCEDURE — 700111 HCHG RX REV CODE 636 W/ 250 OVERRIDE (IP): Performed by: FAMILY MEDICINE

## 2018-11-03 PROCEDURE — 94760 N-INVAS EAR/PLS OXIMETRY 1: CPT

## 2018-11-03 PROCEDURE — 99285 EMERGENCY DEPT VISIT HI MDM: CPT

## 2018-11-03 PROCEDURE — 85007 BL SMEAR W/DIFF WBC COUNT: CPT

## 2018-11-03 PROCEDURE — 93005 ELECTROCARDIOGRAM TRACING: CPT

## 2018-11-03 PROCEDURE — A9270 NON-COVERED ITEM OR SERVICE: HCPCS | Performed by: FAMILY MEDICINE

## 2018-11-03 PROCEDURE — 700101 HCHG RX REV CODE 250: Performed by: FAMILY MEDICINE

## 2018-11-03 PROCEDURE — 700102 HCHG RX REV CODE 250 W/ 637 OVERRIDE(OP): Performed by: EMERGENCY MEDICINE

## 2018-11-03 PROCEDURE — 83880 ASSAY OF NATRIURETIC PEPTIDE: CPT

## 2018-11-03 PROCEDURE — 36415 COLL VENOUS BLD VENIPUNCTURE: CPT

## 2018-11-03 PROCEDURE — 85730 THROMBOPLASTIN TIME PARTIAL: CPT

## 2018-11-03 PROCEDURE — A9270 NON-COVERED ITEM OR SERVICE: HCPCS | Performed by: EMERGENCY MEDICINE

## 2018-11-03 PROCEDURE — 700102 HCHG RX REV CODE 250 W/ 637 OVERRIDE(OP): Performed by: FAMILY MEDICINE

## 2018-11-03 PROCEDURE — 84484 ASSAY OF TROPONIN QUANT: CPT

## 2018-11-03 PROCEDURE — 93005 ELECTROCARDIOGRAM TRACING: CPT | Performed by: EMERGENCY MEDICINE

## 2018-11-03 PROCEDURE — 700105 HCHG RX REV CODE 258: Performed by: FAMILY MEDICINE

## 2018-11-03 PROCEDURE — 770020 HCHG ROOM/CARE - TELE (206)

## 2018-11-03 PROCEDURE — 99223 1ST HOSP IP/OBS HIGH 75: CPT | Performed by: FAMILY MEDICINE

## 2018-11-03 PROCEDURE — 85027 COMPLETE CBC AUTOMATED: CPT

## 2018-11-03 PROCEDURE — 83690 ASSAY OF LIPASE: CPT

## 2018-11-03 PROCEDURE — 85610 PROTHROMBIN TIME: CPT

## 2018-11-03 PROCEDURE — 80053 COMPREHEN METABOLIC PANEL: CPT

## 2018-11-03 PROCEDURE — 71045 X-RAY EXAM CHEST 1 VIEW: CPT

## 2018-11-03 RX ORDER — LEVOTHYROXINE SODIUM 0.12 MG/1
62.5 TABLET ORAL
Status: DISCONTINUED | OUTPATIENT
Start: 2018-11-04 | End: 2018-11-04

## 2018-11-03 RX ORDER — LEVOFLOXACIN 5 MG/ML
750 INJECTION, SOLUTION INTRAVENOUS EVERY 24 HOURS
Status: DISCONTINUED | OUTPATIENT
Start: 2018-11-03 | End: 2018-11-03

## 2018-11-03 RX ORDER — LEVOFLOXACIN 5 MG/ML
750 INJECTION, SOLUTION INTRAVENOUS EVERY 24 HOURS
Status: DISCONTINUED | OUTPATIENT
Start: 2018-11-03 | End: 2018-11-04

## 2018-11-03 RX ORDER — MORPHINE SULFATE 4 MG/ML
4 INJECTION, SOLUTION INTRAMUSCULAR; INTRAVENOUS
Status: DISCONTINUED | OUTPATIENT
Start: 2018-11-03 | End: 2018-11-05 | Stop reason: HOSPADM

## 2018-11-03 RX ORDER — PROMETHAZINE HYDROCHLORIDE 25 MG/1
12.5-25 SUPPOSITORY RECTAL EVERY 4 HOURS PRN
Status: DISCONTINUED | OUTPATIENT
Start: 2018-11-03 | End: 2018-11-05 | Stop reason: HOSPADM

## 2018-11-03 RX ORDER — BUDESONIDE AND FORMOTEROL FUMARATE DIHYDRATE 160; 4.5 UG/1; UG/1
2 AEROSOL RESPIRATORY (INHALATION) 2 TIMES DAILY
Status: DISCONTINUED | OUTPATIENT
Start: 2018-11-04 | End: 2018-11-05 | Stop reason: HOSPADM

## 2018-11-03 RX ORDER — IPRATROPIUM BROMIDE AND ALBUTEROL SULFATE 2.5; .5 MG/3ML; MG/3ML
3 SOLUTION RESPIRATORY (INHALATION) 3 TIMES DAILY
Status: DISCONTINUED | OUTPATIENT
Start: 2018-11-03 | End: 2018-11-05 | Stop reason: HOSPADM

## 2018-11-03 RX ORDER — ONDANSETRON 2 MG/ML
4 INJECTION INTRAMUSCULAR; INTRAVENOUS EVERY 4 HOURS PRN
Status: DISCONTINUED | OUTPATIENT
Start: 2018-11-03 | End: 2018-11-05 | Stop reason: HOSPADM

## 2018-11-03 RX ORDER — PREDNISONE 20 MG/1
60 TABLET ORAL
COMMUNITY
End: 2019-08-09

## 2018-11-03 RX ORDER — ATORVASTATIN CALCIUM 40 MG/1
40 TABLET, FILM COATED ORAL EVERY EVENING
Status: DISCONTINUED | OUTPATIENT
Start: 2018-11-03 | End: 2018-11-05 | Stop reason: HOSPADM

## 2018-11-03 RX ORDER — ALBUTEROL SULFATE 90 UG/1
2 AEROSOL, METERED RESPIRATORY (INHALATION) EVERY 4 HOURS PRN
Status: DISCONTINUED | OUTPATIENT
Start: 2018-11-03 | End: 2018-11-05 | Stop reason: HOSPADM

## 2018-11-03 RX ORDER — ALPRAZOLAM 1 MG/1
1 TABLET ORAL 3 TIMES DAILY PRN
Status: DISCONTINUED | OUTPATIENT
Start: 2018-11-03 | End: 2018-11-05 | Stop reason: HOSPADM

## 2018-11-03 RX ORDER — BUDESONIDE AND FORMOTEROL FUMARATE DIHYDRATE 160; 4.5 UG/1; UG/1
2 AEROSOL RESPIRATORY (INHALATION)
Status: DISCONTINUED | OUTPATIENT
Start: 2018-11-03 | End: 2018-11-03

## 2018-11-03 RX ORDER — SODIUM CHLORIDE 9 MG/ML
INJECTION, SOLUTION INTRAVENOUS CONTINUOUS
Status: DISCONTINUED | OUTPATIENT
Start: 2018-11-03 | End: 2018-11-04

## 2018-11-03 RX ORDER — ONDANSETRON 4 MG/1
4 TABLET, ORALLY DISINTEGRATING ORAL EVERY 4 HOURS PRN
Status: DISCONTINUED | OUTPATIENT
Start: 2018-11-03 | End: 2018-11-05 | Stop reason: HOSPADM

## 2018-11-03 RX ORDER — ACETAMINOPHEN 325 MG/1
650 TABLET ORAL EVERY 6 HOURS PRN
Status: DISCONTINUED | OUTPATIENT
Start: 2018-11-03 | End: 2018-11-05 | Stop reason: HOSPADM

## 2018-11-03 RX ORDER — FAMOTIDINE 20 MG/1
20 TABLET, FILM COATED ORAL 2 TIMES DAILY
Status: DISCONTINUED | OUTPATIENT
Start: 2018-11-03 | End: 2018-11-04

## 2018-11-03 RX ORDER — POLYETHYLENE GLYCOL 3350 17 G/17G
1 POWDER, FOR SOLUTION ORAL
Status: DISCONTINUED | OUTPATIENT
Start: 2018-11-03 | End: 2018-11-05 | Stop reason: HOSPADM

## 2018-11-03 RX ORDER — BISACODYL 10 MG
10 SUPPOSITORY, RECTAL RECTAL
Status: DISCONTINUED | OUTPATIENT
Start: 2018-11-03 | End: 2018-11-05 | Stop reason: HOSPADM

## 2018-11-03 RX ORDER — TRAZODONE HYDROCHLORIDE 50 MG/1
100 TABLET ORAL NIGHTLY PRN
Status: DISCONTINUED | OUTPATIENT
Start: 2018-11-03 | End: 2018-11-05 | Stop reason: HOSPADM

## 2018-11-03 RX ORDER — GABAPENTIN 400 MG/1
800 CAPSULE ORAL EVERY 8 HOURS
Status: DISCONTINUED | OUTPATIENT
Start: 2018-11-03 | End: 2018-11-05 | Stop reason: HOSPADM

## 2018-11-03 RX ORDER — OXYCODONE HYDROCHLORIDE 10 MG/1
10 TABLET ORAL
Status: DISCONTINUED | OUTPATIENT
Start: 2018-11-03 | End: 2018-11-05 | Stop reason: HOSPADM

## 2018-11-03 RX ORDER — GABAPENTIN 800 MG/1
800 TABLET ORAL EVERY 8 HOURS
COMMUNITY
End: 2019-08-09

## 2018-11-03 RX ORDER — OMEPRAZOLE 20 MG/1
20 CAPSULE, DELAYED RELEASE ORAL
Status: DISCONTINUED | OUTPATIENT
Start: 2018-11-03 | End: 2018-11-05 | Stop reason: HOSPADM

## 2018-11-03 RX ORDER — OXYCODONE HYDROCHLORIDE 5 MG/1
5 TABLET ORAL
Status: DISCONTINUED | OUTPATIENT
Start: 2018-11-03 | End: 2018-11-05 | Stop reason: HOSPADM

## 2018-11-03 RX ORDER — PREDNISONE 20 MG/1
60 TABLET ORAL
Status: DISCONTINUED | OUTPATIENT
Start: 2018-11-04 | End: 2018-11-05 | Stop reason: HOSPADM

## 2018-11-03 RX ORDER — PROMETHAZINE HYDROCHLORIDE 25 MG/1
12.5-25 TABLET ORAL EVERY 4 HOURS PRN
Status: DISCONTINUED | OUTPATIENT
Start: 2018-11-03 | End: 2018-11-05 | Stop reason: HOSPADM

## 2018-11-03 RX ORDER — AMOXICILLIN 250 MG
2 CAPSULE ORAL 2 TIMES DAILY
Status: DISCONTINUED | OUTPATIENT
Start: 2018-11-03 | End: 2018-11-05 | Stop reason: HOSPADM

## 2018-11-03 RX ADMIN — OXYCODONE HYDROCHLORIDE 5 MG: 5 TABLET ORAL at 18:23

## 2018-11-03 RX ADMIN — GABAPENTIN 800 MG: 400 CAPSULE ORAL at 21:24

## 2018-11-03 RX ADMIN — ALPRAZOLAM 1 MG: 1 TABLET ORAL at 23:25

## 2018-11-03 RX ADMIN — LEVOFLOXACIN 750 MG: 5 INJECTION, SOLUTION INTRAVENOUS at 15:25

## 2018-11-03 RX ADMIN — ENOXAPARIN SODIUM 40 MG: 100 INJECTION SUBCUTANEOUS at 18:19

## 2018-11-03 RX ADMIN — METRONIDAZOLE 500 MG: 500 INJECTION, SOLUTION INTRAVENOUS at 20:44

## 2018-11-03 RX ADMIN — OXYCODONE HYDROCHLORIDE 10 MG: 10 TABLET ORAL at 21:25

## 2018-11-03 RX ADMIN — SODIUM CHLORIDE: 9 INJECTION, SOLUTION INTRAVENOUS at 18:20

## 2018-11-03 RX ADMIN — GABAPENTIN 800 MG: 400 CAPSULE ORAL at 18:18

## 2018-11-03 RX ADMIN — FAMOTIDINE 20 MG: 20 TABLET, FILM COATED ORAL at 15:24

## 2018-11-03 RX ADMIN — ATORVASTATIN CALCIUM 40 MG: 40 TABLET, FILM COATED ORAL at 18:18

## 2018-11-03 RX ADMIN — ALPRAZOLAM 1 MG: 1 TABLET ORAL at 18:23

## 2018-11-03 ASSESSMENT — COGNITIVE AND FUNCTIONAL STATUS - GENERAL
MOBILITY SCORE: 24
SUGGESTED CMS G CODE MODIFIER DAILY ACTIVITY: CH
DAILY ACTIVITIY SCORE: 24
SUGGESTED CMS G CODE MODIFIER MOBILITY: CH

## 2018-11-03 ASSESSMENT — COPD QUESTIONNAIRES
HAVE YOU SMOKED AT LEAST 100 CIGARETTES IN YOUR ENTIRE LIFE: YES
DO YOU EVER COUGH UP ANY MUCUS OR PHLEGM?: YES, EVERY DAY
DURING THE PAST 4 WEEKS HOW MUCH DID YOU FEEL SHORT OF BREATH: SOME OF THE TIME
COPD SCREENING SCORE: 7

## 2018-11-03 ASSESSMENT — ENCOUNTER SYMPTOMS
DIARRHEA: 0
BACK PAIN: 0
DIZZINESS: 0
NAUSEA: 1
SORE THROAT: 0
NECK PAIN: 0
WEAKNESS: 1
BLURRED VISION: 0
COUGH: 1
SHORTNESS OF BREATH: 1
VOMITING: 1
HEARTBURN: 0
CHILLS: 1
NERVOUS/ANXIOUS: 0
HEADACHES: 0
FEVER: 1
ABDOMINAL PAIN: 0
WHEEZING: 0

## 2018-11-03 ASSESSMENT — PATIENT HEALTH QUESTIONNAIRE - PHQ9
SUM OF ALL RESPONSES TO PHQ9 QUESTIONS 1 AND 2: 0
1. LITTLE INTEREST OR PLEASURE IN DOING THINGS: NOT AT ALL
2. FEELING DOWN, DEPRESSED, IRRITABLE, OR HOPELESS: NOT AT ALL

## 2018-11-03 ASSESSMENT — PAIN SCALES - GENERAL
PAINLEVEL_OUTOF10: 6
PAINLEVEL_OUTOF10: 8
PAINLEVEL_OUTOF10: 6

## 2018-11-03 ASSESSMENT — LIFESTYLE VARIABLES
EVER_SMOKED: YES
EVER_SMOKED: YES

## 2018-11-03 NOTE — ED TRIAGE NOTES
BIB [facility] to rm 4, pt is coming from home  Chief Complaint   Patient presents with   • Chest Pain     CP x3 days, central chest, worse with a deep breath, was here 1 week ago for pneumonia, feels SOB but is 94-96% RA, pt still smoking daily.  IV started, blood sent to lab, chart up for ERP.

## 2018-11-03 NOTE — ED NOTES
Med Rec Updated and Complete per Pt at bedside  Allergies Reviewed    Pt states she was given 3 tablets of Levaquin from the Hospital to finish at home the day she was discharged 10/21/18.    Pt states she stopped taking Aspirin 81mg daily due to concern for bleeding.
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
## 2018-11-04 ENCOUNTER — APPOINTMENT (OUTPATIENT)
Dept: RADIOLOGY | Facility: MEDICAL CENTER | Age: 43
DRG: 193 | End: 2018-11-04
Attending: INTERNAL MEDICINE
Payer: MEDICAID

## 2018-11-04 PROBLEM — J96.21 ACUTE ON CHRONIC RESPIRATORY FAILURE WITH HYPOXIA (HCC): Status: ACTIVE | Noted: 2018-10-16

## 2018-11-04 PROBLEM — D50.9 MICROCYTIC ANEMIA: Status: ACTIVE | Noted: 2018-10-17

## 2018-11-04 LAB
ANION GAP SERPL CALC-SCNC: 11 MMOL/L (ref 0–11.9)
BASOPHILS # BLD AUTO: 0.2 % (ref 0–1.8)
BASOPHILS # BLD: 0.02 K/UL (ref 0–0.12)
BUN SERPL-MCNC: 14 MG/DL (ref 8–22)
CALCIUM SERPL-MCNC: 8.4 MG/DL (ref 8.5–10.5)
CHLORIDE SERPL-SCNC: 104 MMOL/L (ref 96–112)
CO2 SERPL-SCNC: 23 MMOL/L (ref 20–33)
CREAT SERPL-MCNC: 0.89 MG/DL (ref 0.5–1.4)
EOSINOPHIL # BLD AUTO: 0 K/UL (ref 0–0.51)
EOSINOPHIL NFR BLD: 0 % (ref 0–6.9)
ERYTHROCYTE [DISTWIDTH] IN BLOOD BY AUTOMATED COUNT: 38.5 FL (ref 35.9–50)
FERRITIN SERPL-MCNC: 33.5 NG/ML (ref 10–291)
GLUCOSE SERPL-MCNC: 128 MG/DL (ref 65–99)
HCT VFR BLD AUTO: 30.2 % (ref 37–47)
HGB BLD-MCNC: 9.6 G/DL (ref 12–16)
HGB RETIC QN AUTO: 23.8 PG/CELL (ref 29–35)
IMM GRANULOCYTES # BLD AUTO: 0.36 K/UL (ref 0–0.11)
IMM GRANULOCYTES NFR BLD AUTO: 4.3 % (ref 0–0.9)
IMM RETICS NFR: 42.6 % (ref 9.3–17.4)
IRON SATN MFR SERPL: 31 % (ref 15–55)
IRON SERPL-MCNC: 106 UG/DL (ref 40–170)
LYMPHOCYTES # BLD AUTO: 1.64 K/UL (ref 1–4.8)
LYMPHOCYTES NFR BLD: 19.5 % (ref 22–41)
MCH RBC QN AUTO: 21.1 PG (ref 27–33)
MCHC RBC AUTO-ENTMCNC: 31.8 G/DL (ref 33.6–35)
MCV RBC AUTO: 66.2 FL (ref 81.4–97.8)
MONOCYTES # BLD AUTO: 0.29 K/UL (ref 0–0.85)
MONOCYTES NFR BLD AUTO: 3.5 % (ref 0–13.4)
NEUTROPHILS # BLD AUTO: 6.08 K/UL (ref 2–7.15)
NEUTROPHILS NFR BLD: 72.5 % (ref 44–72)
NRBC # BLD AUTO: 0.03 K/UL
NRBC BLD-RTO: 0.4 /100 WBC
PLATELET # BLD AUTO: 223 K/UL (ref 164–446)
PMV BLD AUTO: 9 FL (ref 9–12.9)
POTASSIUM SERPL-SCNC: 4.3 MMOL/L (ref 3.6–5.5)
RBC # BLD AUTO: 4.56 M/UL (ref 4.2–5.4)
RETICS # AUTO: 0.15 M/UL (ref 0.04–0.06)
RETICS/RBC NFR: 3.4 % (ref 0.8–2.1)
SODIUM SERPL-SCNC: 138 MMOL/L (ref 135–145)
T4 FREE SERPL-MCNC: 1.07 NG/DL (ref 0.53–1.43)
TIBC SERPL-MCNC: 346 UG/DL (ref 250–450)
TROPONIN I SERPL-MCNC: 0.03 NG/ML (ref 0–0.04)
TROPONIN I SERPL-MCNC: 0.04 NG/ML (ref 0–0.04)
TROPONIN I SERPL-MCNC: 0.05 NG/ML (ref 0–0.04)
TROPONIN I SERPL-MCNC: 0.07 NG/ML (ref 0–0.04)
TSH SERPL DL<=0.005 MIU/L-ACNC: 0.04 UIU/ML (ref 0.38–5.33)
WBC # BLD AUTO: 8.4 K/UL (ref 4.8–10.8)

## 2018-11-04 PROCEDURE — 84439 ASSAY OF FREE THYROXINE: CPT

## 2018-11-04 PROCEDURE — 82728 ASSAY OF FERRITIN: CPT

## 2018-11-04 PROCEDURE — 770020 HCHG ROOM/CARE - TELE (206)

## 2018-11-04 PROCEDURE — 700101 HCHG RX REV CODE 250

## 2018-11-04 PROCEDURE — 700102 HCHG RX REV CODE 250 W/ 637 OVERRIDE(OP): Performed by: EMERGENCY MEDICINE

## 2018-11-04 PROCEDURE — 36569 INSJ PICC 5 YR+ W/O IMAGING: CPT

## 2018-11-04 PROCEDURE — 84443 ASSAY THYROID STIM HORMONE: CPT

## 2018-11-04 PROCEDURE — 99233 SBSQ HOSP IP/OBS HIGH 50: CPT | Performed by: INTERNAL MEDICINE

## 2018-11-04 PROCEDURE — 83550 IRON BINDING TEST: CPT

## 2018-11-04 PROCEDURE — 36415 COLL VENOUS BLD VENIPUNCTURE: CPT

## 2018-11-04 PROCEDURE — 94640 AIRWAY INHALATION TREATMENT: CPT

## 2018-11-04 PROCEDURE — 700111 HCHG RX REV CODE 636 W/ 250 OVERRIDE (IP): Performed by: FAMILY MEDICINE

## 2018-11-04 PROCEDURE — 83540 ASSAY OF IRON: CPT

## 2018-11-04 PROCEDURE — A9270 NON-COVERED ITEM OR SERVICE: HCPCS | Performed by: EMERGENCY MEDICINE

## 2018-11-04 PROCEDURE — 700102 HCHG RX REV CODE 250 W/ 637 OVERRIDE(OP): Performed by: FAMILY MEDICINE

## 2018-11-04 PROCEDURE — 05HY33Z INSERTION OF INFUSION DEVICE INTO UPPER VEIN, PERCUTANEOUS APPROACH: ICD-10-PCS | Performed by: INTERNAL MEDICINE

## 2018-11-04 PROCEDURE — 84484 ASSAY OF TROPONIN QUANT: CPT | Mod: 91

## 2018-11-04 PROCEDURE — 80048 BASIC METABOLIC PNL TOTAL CA: CPT

## 2018-11-04 PROCEDURE — B54NZZA ULTRASONOGRAPHY OF LEFT UPPER EXTREMITY VEINS, GUIDANCE: ICD-10-PCS | Performed by: INTERNAL MEDICINE

## 2018-11-04 PROCEDURE — 85025 COMPLETE CBC W/AUTO DIFF WBC: CPT

## 2018-11-04 PROCEDURE — 85046 RETICYTE/HGB CONCENTRATE: CPT

## 2018-11-04 PROCEDURE — 700101 HCHG RX REV CODE 250: Performed by: FAMILY MEDICINE

## 2018-11-04 PROCEDURE — 94760 N-INVAS EAR/PLS OXIMETRY 1: CPT

## 2018-11-04 PROCEDURE — A9270 NON-COVERED ITEM OR SERVICE: HCPCS | Performed by: FAMILY MEDICINE

## 2018-11-04 PROCEDURE — 700111 HCHG RX REV CODE 636 W/ 250 OVERRIDE (IP): Performed by: EMERGENCY MEDICINE

## 2018-11-04 RX ORDER — LEVOFLOXACIN 750 MG/1
750 TABLET, FILM COATED ORAL DAILY
Status: DISCONTINUED | OUTPATIENT
Start: 2018-11-05 | End: 2018-11-05 | Stop reason: HOSPADM

## 2018-11-04 RX ORDER — IPRATROPIUM BROMIDE AND ALBUTEROL SULFATE 2.5; .5 MG/3ML; MG/3ML
SOLUTION RESPIRATORY (INHALATION)
Status: COMPLETED
Start: 2018-11-04 | End: 2018-11-04

## 2018-11-04 RX ADMIN — IPRATROPIUM BROMIDE AND ALBUTEROL SULFATE 3 ML: .5; 3 SOLUTION RESPIRATORY (INHALATION) at 07:28

## 2018-11-04 RX ADMIN — ALPRAZOLAM 1 MG: 1 TABLET ORAL at 04:29

## 2018-11-04 RX ADMIN — OXYCODONE HYDROCHLORIDE 10 MG: 10 TABLET ORAL at 21:11

## 2018-11-04 RX ADMIN — GABAPENTIN 800 MG: 400 CAPSULE ORAL at 13:40

## 2018-11-04 RX ADMIN — OXYCODONE HYDROCHLORIDE 10 MG: 10 TABLET ORAL at 00:54

## 2018-11-04 RX ADMIN — LEVOTHYROXINE SODIUM 62.5 MCG: 125 TABLET ORAL at 04:29

## 2018-11-04 RX ADMIN — BUDESONIDE AND FORMOTEROL FUMARATE DIHYDRATE 2 PUFF: 160; 4.5 AEROSOL RESPIRATORY (INHALATION) at 05:06

## 2018-11-04 RX ADMIN — OXYCODONE HYDROCHLORIDE 10 MG: 10 TABLET ORAL at 09:22

## 2018-11-04 RX ADMIN — SENNOSIDES AND DOCUSATE SODIUM 2 TABLET: 8.6; 5 TABLET ORAL at 05:05

## 2018-11-04 RX ADMIN — OXYCODONE HYDROCHLORIDE 10 MG: 10 TABLET ORAL at 17:25

## 2018-11-04 RX ADMIN — SENNOSIDES AND DOCUSATE SODIUM 2 TABLET: 8.6; 5 TABLET ORAL at 17:25

## 2018-11-04 RX ADMIN — GABAPENTIN 800 MG: 400 CAPSULE ORAL at 04:29

## 2018-11-04 RX ADMIN — OXYCODONE HYDROCHLORIDE 10 MG: 10 TABLET ORAL at 13:40

## 2018-11-04 RX ADMIN — LEVOFLOXACIN 750 MG: 750 INJECTION, SOLUTION INTRAVENOUS at 04:50

## 2018-11-04 RX ADMIN — FAMOTIDINE 20 MG: 20 TABLET, FILM COATED ORAL at 09:22

## 2018-11-04 RX ADMIN — OXYCODONE HYDROCHLORIDE 10 MG: 10 TABLET ORAL at 03:09

## 2018-11-04 RX ADMIN — BUDESONIDE AND FORMOTEROL FUMARATE DIHYDRATE 2 PUFF: 160; 4.5 AEROSOL RESPIRATORY (INHALATION) at 17:26

## 2018-11-04 RX ADMIN — PREDNISONE 60 MG: 20 TABLET ORAL at 09:22

## 2018-11-04 RX ADMIN — GABAPENTIN 800 MG: 400 CAPSULE ORAL at 21:11

## 2018-11-04 RX ADMIN — ATORVASTATIN CALCIUM 40 MG: 40 TABLET, FILM COATED ORAL at 17:25

## 2018-11-04 RX ADMIN — METRONIDAZOLE 500 MG: 500 INJECTION, SOLUTION INTRAVENOUS at 06:11

## 2018-11-04 RX ADMIN — IPRATROPIUM BROMIDE AND ALBUTEROL SULFATE 3 ML: .5; 3 SOLUTION RESPIRATORY (INHALATION) at 19:55

## 2018-11-04 RX ADMIN — ALPRAZOLAM 1 MG: 1 TABLET ORAL at 10:43

## 2018-11-04 RX ADMIN — ENOXAPARIN SODIUM 40 MG: 100 INJECTION SUBCUTANEOUS at 05:05

## 2018-11-04 RX ADMIN — ASPIRIN 81 MG: 81 TABLET, COATED ORAL at 05:05

## 2018-11-04 RX ADMIN — IPRATROPIUM BROMIDE AND ALBUTEROL SULFATE 3 ML: .5; 3 SOLUTION RESPIRATORY (INHALATION) at 14:59

## 2018-11-04 RX ADMIN — IPRATROPIUM BROMIDE AND ALBUTEROL SULFATE 3 ML: .5; 3 SOLUTION RESPIRATORY (INHALATION) at 00:07

## 2018-11-04 RX ADMIN — ALPRAZOLAM 1 MG: 1 TABLET ORAL at 19:46

## 2018-11-04 ASSESSMENT — ENCOUNTER SYMPTOMS
RESPIRATORY NEGATIVE: 1
GASTROINTESTINAL NEGATIVE: 1
PSYCHIATRIC NEGATIVE: 1
MUSCULOSKELETAL NEGATIVE: 1
NEUROLOGICAL NEGATIVE: 1
CONSTITUTIONAL NEGATIVE: 1
CARDIOVASCULAR NEGATIVE: 1
EYES NEGATIVE: 1

## 2018-11-04 ASSESSMENT — PAIN SCALES - GENERAL
PAINLEVEL_OUTOF10: 8
PAINLEVEL_OUTOF10: 3
PAINLEVEL_OUTOF10: 8
PAINLEVEL_OUTOF10: 8
PAINLEVEL_OUTOF10: 9
PAINLEVEL_OUTOF10: 8
PAINLEVEL_OUTOF10: 5
PAINLEVEL_OUTOF10: 5
PAINLEVEL_OUTOF10: 9
PAINLEVEL_OUTOF10: 8

## 2018-11-04 NOTE — ASSESSMENT & PLAN NOTE
- stable; monitoring  - iron studies, retic suggestive of ALESIA; stool occult negative  - starting oral iron supplementation

## 2018-11-04 NOTE — PROGRESS NOTES
Hospital Medicine Daily Progress Note    Date of Service  11/4/2018    Chief Complaint  43 y.o. female admitted 11/3/2018 with chest pain    Hospital Course    42yo F with PMHx of Graves Disease was admitted for acute on chronic respiratory failure with hypoxia due to HCAP.  Pt was recently admitted for possible NSTEMI but no stress test was completed.  Patient was started on O2 supplementation, IV antibiotics after sputum culture was ordered.  Cardiac stress test was ordered after serial troponins and EKGs were obtained.  No ischemic changes noted on EKG but serial trops were mildly elevated but stable.  Patient was also complaining of throat discomfort.  With history of Graves disease, patient had been on Synthroid despite not undergoing any radioactive iodine treatments.  TSH was elevated with normal T4.  Levothyroxine was temporarily discontinued and Endo will be called.       Interval Problem Update  Pt reporting throat pain.  Denies any fever or chills.  Pt also reports SOB which has been improving.    Consultants/Specialty  NONE    Code Status  FULL    Disposition  Home when medically stable    Review of Systems  Review of Systems   Constitutional: Negative.    HENT:        Throat discomfort   Eyes: Negative.    Respiratory: Negative.    Cardiovascular: Negative.    Gastrointestinal: Negative.    Musculoskeletal: Negative.    Skin: Negative.    Neurological: Negative.    Psychiatric/Behavioral: Negative.         Physical Exam  Temp:  [36.2 °C (97.2 °F)-37 °C (98.6 °F)] 36.3 °C (97.3 °F)  Pulse:  [50-85] 82  Resp:  [16-24] 16  BP: ()/(56-72) 100/66    Physical Exam   Constitutional: She is oriented to person, place, and time. She appears well-developed and well-nourished. No distress.   HENT:   Head: Normocephalic and atraumatic.   Mouth/Throat: No oropharyngeal exudate.   Eyes: Pupils are equal, round, and reactive to light. Conjunctivae and EOM are normal. No scleral icterus.   Mild exophthalmos noted    Neck: Normal range of motion. Neck supple.   Pulmonary/Chest: Effort normal and breath sounds normal. No respiratory distress.   Abdominal: Soft. Bowel sounds are normal. She exhibits no distension. There is no tenderness.   Musculoskeletal: She exhibits no edema, tenderness or deformity.   Neurological: She is alert and oriented to person, place, and time. She has normal reflexes. No cranial nerve deficit.   Skin: Skin is warm and dry.   Nursing note and vitals reviewed.      Fluids    Intake/Output Summary (Last 24 hours) at 11/04/18 1025  Last data filed at 11/04/18 0620   Gross per 24 hour   Intake             2050 ml   Output                0 ml   Net             2050 ml       Laboratory  Recent Labs      11/03/18   1330  11/04/18   0135   WBC  11.5*  8.4   RBC  5.02  4.56   HEMOGLOBIN  10.2*  9.6*   HEMATOCRIT  33.9*  30.2*   MCV  67.5*  66.2*   MCH  20.3*  21.1*   MCHC  30.1*  31.8*   RDW  39.0  38.5   PLATELETCT  275  223   MPV  9.1  9.0     Recent Labs      11/03/18   1330  11/04/18   0135   SODIUM  138  138   POTASSIUM  3.8  4.3   CHLORIDE  105  104   CO2  25  23   GLUCOSE  81  128*   BUN  13  14   CREATININE  0.74  0.89   CALCIUM  8.5  8.4*     Recent Labs      11/03/18   1330   APTT  20.2*   INR  0.99     Recent Labs      11/03/18   1330   BNPBTYPENAT  24           Imaging  IR-PICC LINE PLACEMENT   Final Result                  Ultrasound-guided PICC placement performed by qualified nursing staff as    above.          DX-CHEST-LIMITED (1 VIEW)   Final Result         1.  Poorly defined opacifications noted in each lung greater on the left side. Findings could be due to edema or residual pneumonia or pneumonitis.      NM-CARDIAC STRESS TEST    (Results Pending)        Assessment/Plan  * Healthcare-associated pneumonia- (present on admission)   Assessment & Plan    IV Levaquin and Flagyl  - sp cx pending; will de-escalate abx as appropriate     Chest pain- (present on admission)   Assessment & Plan    -  cont ASA/statin  - mildly elevated trops noted; EKGs unremarkable  - stress test pending     Acute on chronic respiratory failure with hypoxia (HCC)- (present on admission)   Assessment & Plan    Keep O2 sat above 94%  Encourage IS, RT protocol     Microcytic anemia- (present on admission)   Assessment & Plan    - stable; monitoring  - iron studies, retic, stool occult pending     Graves disease- (present on admission)   Assessment & Plan    Was on Synthroid prior to admit  - c/o throat pain and eye problems  - TSH low, FT4 wnl  - will d/c Levothyroxine and discuss with outpt Endo (Dr. Barksdale)          VTE prophylaxis: Lovenox

## 2018-11-04 NOTE — PROGRESS NOTES
Bedside report received, assumed care of patient. Assessment performed.  Patient's IV infiltrated before flagyl administered. Will attempt another IV. Discussed plan of care with pt.  Call light within reach, pt educated to call for assistance.

## 2018-11-04 NOTE — PROGRESS NOTES
Received bedside report from PM RN Oksana. Assumed patient care. Chart reviewed. Pt was ambulating to the bathroom. A&O x 4. No concerns, complaints or distress.POC updated with pt and on the patient communication board. Bed is locked and in the lowest position. Call light within reach. Tele box on. SR 78 on the monitor. Will continue to monitor.

## 2018-11-04 NOTE — PROCEDURES
Vascular Access Team    Date of Insertion: 11/4/18  Arm Circumference: n/a  Line Length: 10cm  Line Size: 20g  Vein Occupancy %: 37  Reason for Midline: Lack of access  Labs: WBC 8.4, , INR 0.99, BUN 14, Cr 0.89, GFR >60    Orders confirmed, vessel patency confirmed with ultrasound. Risks and benefits of procedure explained to patient and education regarding line associated bloodstream infections provided. Questions answered.     PowerGlide Midline placed in LUE per MD order with ultrasound guidance. 20g, 10 cm line placed in brachial vein after 1 attempt(s).  Catheter inserted with good blood return. Secured with 0cm external from insertion site.  Flushed without resistance with 10 mL 0.9% normal saline. Midline secured with Biopatch and Tegaderm.     Midline placement is confirmed by nurse using ultrasound and ability to flush and draw blood. Midline is appropriate for use at this time.  No X-ray is needed for placement confirmation. Pt tolerated procedure well.  Patient condition relayed to unit RN or ordering physician via this post procedure note in the EMR.    Ultrasound images uploaded to PACS and viewable in the EMR - yes  Ultrasound imaged printed and placed in paper chart - no      BARD PowerGlide Midline ref # [id number], Lot # [id number]

## 2018-11-04 NOTE — H&P
Hospital Medicine History & Physical Note    Date of Service  11/3/2018    Primary Care Physician  TEJAS Mata    Consultants  None    Code Status  Full    Chief Complaint  Chest pain    History of Presenting Illness  43 y.o. female who presented 11/3/2018 with chest pain that started last night.  Patient also states that for the past 3 days she has been having having fever and chills, cough and congestion, nausea and vomiting.  She was already on oxygen at home.  Of note she was recently admitted here about 2 weeks ago secondary to possible end-stage elevation myocardial infarction but she did not get a stress test done.  Was supposed to be done as outpatient.  Troponin today is only mildly elevated 0.06.  EKG is negative.  Workup was more of pneumonia.    Review of Systems  Review of Systems   Constitutional: Positive for chills, fever and malaise/fatigue.   HENT: Negative for hearing loss and sore throat.    Eyes: Negative for blurred vision.   Respiratory: Positive for cough and shortness of breath. Negative for wheezing.    Cardiovascular: Positive for chest pain. Negative for leg swelling.   Gastrointestinal: Positive for nausea and vomiting. Negative for abdominal pain, diarrhea and heartburn.   Genitourinary: Negative for dysuria.   Musculoskeletal: Negative for back pain and neck pain.   Skin: Negative for rash.   Neurological: Positive for weakness. Negative for dizziness and headaches.   Psychiatric/Behavioral: The patient is not nervous/anxious.        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    Surgical History   has a past surgical history that includes open reduction; appendectomy; abdominal exploration; cholecystectomy; and tubal coagulation laparoscopic bilateral.     Family History  family history includes Alcohol/Drug in her mother; Cancer in her father; Hypertension in her mother; Psychiatry in her mother.     Social History   reports that she has been smoking.  She has a 6.25 pack-year smoking history. She has never used smokeless tobacco. She reports that she uses drugs, including Marijuana and Inhaled. She reports that she does not drink alcohol.    Allergies  Allergies   Allergen Reactions   • Penicillins Anaphylaxis     Reported that her throat closes   • Sulfa Drugs Anaphylaxis     Reported that her throat closes   • Carbamazepine Rash and Swelling     .   • Cefadroxil      Pt is not sure, but knows that she has an allergy to this medication.   • Cephalexin Rash and Swelling     Full body sweeling and rash    • Methadone Swelling   • Mushroom Extract Complex Swelling   • Phenytoin      Swelling   • Rondec Rash and Swelling     .       Medications  Prior to Admission Medications   Prescriptions Last Dose Informant Patient Reported? Taking?   Fluticasone Furoate-Vilanterol (BREO ELLIPTA) 100-25 MCG/INH AEROSOL POWDER, BREATH ACTIVATED 11/3/2018 at 0700 Patient Yes No   Sig: Inhale 1 Puff by mouth every day.   albuterol (PROVENTIL) 2.5mg/3ml Nebu Soln solution for nebulization 11/3/2018 at AM Patient Yes No   Sig: Use as needed   albuterol (VENTOLIN HFA) 108 (90 Base) MCG/ACT Aero Soln inhalation aerosol 11/3/2018 at 0900 Patient No No   Sig: Inhale 2 Puffs by mouth every four hours as needed for Shortness of Breath.   alprazolam (XANAX) 1 MG TABS 11/3/2018 at 0400 Patient Yes No   Sig: Take 1 mg by mouth 3 times a day as needed.  Indications: Feeling Anxious, Trouble Sleeping   aspirin EC (ECOTRIN) 81 MG Tablet Delayed Response 11/3/2018 at AM Patient Yes Yes   Sig: Take 81 mg by mouth every day.   atorvastatin (LIPITOR) 40 MG Tab 11/2/2018 at PM Patient No No   Sig: Take 1 Tab by mouth every evening.   gabapentin (NEURONTIN) 800 MG tablet 11/3/2018 at AM Patient Yes Yes   Sig: Take 800 mg by mouth every 8 hours.   levothyroxine (SYNTHROID) 125 MCG Tab 11/3/2018 at 0600 Patient Yes No   Sig: Take 62.5 mcg by mouth every day.   omeprazole (PRILOSEC) 20 MG delayed-release capsule 11/2/2018 at PRN Patient Yes No   Sig: Take 20 mg by mouth 1 time daily as needed (Heartburn).   predniSONE (DELTASONE) 20 MG Tab 11/3/2018 at AM Patient Yes Yes   Sig: Take 60 mg by mouth every morning with breakfast.   traZODone (DESYREL) 100 MG Tab week at PRN Patient Yes No   Sig: Take 100 mg by mouth at bedtime as needed for Sleep.      Facility-Administered Medications: None       Physical Exam  Temp:  [36.2 °C (97.2 °F)-36.5 °C (97.7 °F)] 36.4 °C (97.5 °F)  Pulse:  [50-85] 85  Resp:  [17-24] 18  BP: ()/(56-68) 98/68    Physical Exam   Constitutional: She is oriented to person, place, and time. She appears well-developed and well-nourished.   HENT:   Head: Normocephalic and atraumatic.   Eyes: Pupils are equal, round, and reactive to light. Conjunctivae are normal.   Neck: No tracheal deviation present. No thyromegaly present.   Cardiovascular: Normal rate and regular rhythm.    Pulmonary/Chest: Effort normal. She has rales.   Abdominal: Soft. Bowel sounds are normal. She exhibits no distension. There is no tenderness.   Musculoskeletal: She exhibits no edema.   Lymphadenopathy:     She has no cervical adenopathy.   Neurological: She is alert and oriented to person, place, and time.   Skin: Skin is warm and dry.   Nursing note and vitals reviewed.      Laboratory:  Recent Labs      11/03/18   1330   WBC  11.5*   RBC  5.02   HEMOGLOBIN  10.2*   HEMATOCRIT   33.9*   MCV  67.5*   MCH  20.3*   MCHC  30.1*   RDW  39.0   PLATELETCT  275   MPV  9.1     Recent Labs      11/03/18   1330   SODIUM  138   POTASSIUM  3.8   CHLORIDE  105   CO2  25   GLUCOSE  81   BUN  13   CREATININE  0.74   CALCIUM  8.5     Recent Labs      11/03/18   1330   ALTSGPT  13   ASTSGOT  15   ALKPHOSPHAT  79   TBILIRUBIN  0.5   LIPASE  15   GLUCOSE  81     Recent Labs      11/03/18   1330   APTT  20.2*   INR  0.99     Recent Labs      11/03/18   1330   BNPBTYPENAT  24         Recent Labs      11/03/18   1330  11/03/18   1938   TROPONINI  0.05*  0.06*       Urinalysis:    No results found     Imaging:  DX-CHEST-LIMITED (1 VIEW)   Final Result         1.  Poorly defined opacifications noted in each lung greater on the left side. Findings could be due to edema or residual pneumonia or pneumonitis.            Assessment/Plan:  I anticipate this patient will require at least two midnights for appropriate medical management, necessitating inpatient admission.    * Pneumonia- (present on admission)   Assessment & Plan    IV Levaquin and Flagyl  Follow culture        Chest pain- (present on admission)   Assessment & Plan    Aspirin, Lipitor  Serial troponin  Will need stress test        Acute respiratory failure with hypoxia (HCC)- (present on admission)   Assessment & Plan    Keep O2 sat above 94%  Encourage IS, RT protocol        Anemia- (present on admission)   Assessment & Plan    Follow CBC        Hypothyroidism- (present on admission)   Assessment & Plan    Continue Synthroid            VTE prophylaxis: Lovenox

## 2018-11-04 NOTE — ASSESSMENT & PLAN NOTE
Was on Synthroid prior to admit  - c/o throat pain and eye problems  - TSH low, FT4 wnl  - d/c'ed Levothyroxine and discuss with outpt Endo (Dr. Barksdale)

## 2018-11-04 NOTE — PROGRESS NOTES
Bedside report received from ER nurse. Assumed care of pt. Pt transported to T736-1 on [facility]. Pt awake, laying in bed. A/Ox4, VSS. C/o pain in back 8/10. Medicated per MAR. Pt oriented to unit. POC reviewed and white board updated. Tele box on. Call light in reach. Bed locked in lowest position with 2 upper bed rails up. Bed alarm on. Pt educated to call before getting out of bed.

## 2018-11-04 NOTE — PROGRESS NOTES
Lab work placed for patient to get [facility] and estradiol. Would like lab order faxed to [phone] at [facility]. She is going to start taking her provera after this is drawn tomorrow. MS:  [id number]  [phone]

## 2018-11-04 NOTE — PROGRESS NOTES
2 RN skin check with [facility] [place]:    Nose bridge red, blanching (where glasses lie)  Left hand healing burn  Left forearm bruising  Left lower extremity old scarring    No open wounds or areas of concern.

## 2018-11-05 ENCOUNTER — APPOINTMENT (OUTPATIENT)
Dept: RADIOLOGY | Facility: MEDICAL CENTER | Age: 43
DRG: 193 | End: 2018-11-05
Attending: INTERNAL MEDICINE
Payer: MEDICAID

## 2018-11-05 VITALS
TEMPERATURE: 96.8 F | DIASTOLIC BLOOD PRESSURE: 70 MMHG | BODY MASS INDEX: 25.43 KG/M2 | WEIGHT: 167.77 LBS | RESPIRATION RATE: 18 BRPM | SYSTOLIC BLOOD PRESSURE: 115 MMHG | HEART RATE: 86 BPM | OXYGEN SATURATION: 94 % | HEIGHT: 68 IN

## 2018-11-05 LAB
HEMOCCULT STL QL: NEGATIVE
TROPONIN I SERPL-MCNC: 0.03 NG/ML (ref 0–0.04)
TROPONIN I SERPL-MCNC: 0.04 NG/ML (ref 0–0.04)

## 2018-11-05 PROCEDURE — 700102 HCHG RX REV CODE 250 W/ 637 OVERRIDE(OP): Performed by: INTERNAL MEDICINE

## 2018-11-05 PROCEDURE — A9270 NON-COVERED ITEM OR SERVICE: HCPCS | Performed by: INTERNAL MEDICINE

## 2018-11-05 PROCEDURE — 84484 ASSAY OF TROPONIN QUANT: CPT

## 2018-11-05 PROCEDURE — A9502 TC99M TETROFOSMIN: HCPCS

## 2018-11-05 PROCEDURE — 700102 HCHG RX REV CODE 250 W/ 637 OVERRIDE(OP): Performed by: FAMILY MEDICINE

## 2018-11-05 PROCEDURE — 82272 OCCULT BLD FECES 1-3 TESTS: CPT

## 2018-11-05 PROCEDURE — A9270 NON-COVERED ITEM OR SERVICE: HCPCS | Performed by: FAMILY MEDICINE

## 2018-11-05 PROCEDURE — 700111 HCHG RX REV CODE 636 W/ 250 OVERRIDE (IP): Performed by: FAMILY MEDICINE

## 2018-11-05 PROCEDURE — 99239 HOSP IP/OBS DSCHRG MGMT >30: CPT | Performed by: INTERNAL MEDICINE

## 2018-11-05 PROCEDURE — 700111 HCHG RX REV CODE 636 W/ 250 OVERRIDE (IP)

## 2018-11-05 RX ORDER — FERROUS SULFATE 325(65) MG
325 TABLET ORAL
Status: DISCONTINUED | OUTPATIENT
Start: 2018-11-06 | End: 2018-11-05 | Stop reason: HOSPADM

## 2018-11-05 RX ORDER — REGADENOSON 0.08 MG/ML
INJECTION, SOLUTION INTRAVENOUS
Status: COMPLETED
Start: 2018-11-05 | End: 2018-11-05

## 2018-11-05 RX ORDER — FERROUS SULFATE 325(65) MG
325 TABLET ORAL
Qty: 30 TAB | Refills: 0 | Status: SHIPPED | OUTPATIENT
Start: 2018-11-06 | End: 2019-08-09

## 2018-11-05 RX ORDER — LEVOFLOXACIN 750 MG/1
750 TABLET, FILM COATED ORAL DAILY
Qty: 5 TAB | Refills: 0 | Status: SHIPPED | OUTPATIENT
Start: 2018-11-06 | End: 2018-11-11

## 2018-11-05 RX ADMIN — OXYCODONE HYDROCHLORIDE 5 MG: 5 TABLET ORAL at 14:32

## 2018-11-05 RX ADMIN — PREDNISONE 60 MG: 20 TABLET ORAL at 10:19

## 2018-11-05 RX ADMIN — ASPIRIN 81 MG: 81 TABLET, COATED ORAL at 05:20

## 2018-11-05 RX ADMIN — GABAPENTIN 800 MG: 400 CAPSULE ORAL at 14:32

## 2018-11-05 RX ADMIN — OXYCODONE HYDROCHLORIDE 5 MG: 5 TABLET ORAL at 03:22

## 2018-11-05 RX ADMIN — OXYCODONE HYDROCHLORIDE 5 MG: 5 TABLET ORAL at 10:21

## 2018-11-05 RX ADMIN — GABAPENTIN 800 MG: 400 CAPSULE ORAL at 05:19

## 2018-11-05 RX ADMIN — SENNOSIDES AND DOCUSATE SODIUM 2 TABLET: 8.6; 5 TABLET ORAL at 05:20

## 2018-11-05 RX ADMIN — BUDESONIDE AND FORMOTEROL FUMARATE DIHYDRATE 2 PUFF: 160; 4.5 AEROSOL RESPIRATORY (INHALATION) at 05:19

## 2018-11-05 RX ADMIN — REGADENOSON 0.4 MG: 0.08 INJECTION, SOLUTION INTRAVENOUS at 08:54

## 2018-11-05 RX ADMIN — ALPRAZOLAM 1 MG: 1 TABLET ORAL at 03:22

## 2018-11-05 RX ADMIN — LEVOFLOXACIN 750 MG: 750 TABLET, FILM COATED ORAL at 05:20

## 2018-11-05 RX ADMIN — OXYCODONE HYDROCHLORIDE 5 MG: 5 TABLET ORAL at 00:18

## 2018-11-05 RX ADMIN — ENOXAPARIN SODIUM 40 MG: 100 INJECTION SUBCUTANEOUS at 05:20

## 2018-11-05 RX ADMIN — OXYCODONE HYDROCHLORIDE 5 MG: 5 TABLET ORAL at 06:22

## 2018-11-05 RX ADMIN — ALPRAZOLAM 1 MG: 1 TABLET ORAL at 10:21

## 2018-11-05 ASSESSMENT — PAIN SCALES - GENERAL
PAINLEVEL_OUTOF10: 7
PAINLEVEL_OUTOF10: 6
PAINLEVEL_OUTOF10: 4
PAINLEVEL_OUTOF10: 8
PAINLEVEL_OUTOF10: 2
PAINLEVEL_OUTOF10: 2
PAINLEVEL_OUTOF10: 7

## 2018-11-05 NOTE — DISCHARGE SUMMARY
Discharge Summary    CHIEF COMPLAINT ON ADMISSION  Chief Complaint   Patient presents with   • Chest Pain       Reason for Admission  SOB    Admission Date  11/3/2018    CODE STATUS  Full Code    HPI & HOSPITAL COURSE   42yo F with PMHx of Graves Disease was admitted for acute on chronic respiratory failure with hypoxia due to HCAP.  Pt was recently admitted for possible NSTEMI but no stress test was completed.  Patient was started on O2 supplementation but rapidly weaned off, IV antibiotics after sputum culture was ordered.  Cardiac stress test was ordered after serial troponins and EKGs were obtained and unremarkable.  No ischemic changes noted on EKG but serial trops were mildly elevated but stable.  Patient was also complaining of throat discomfort.  With history of Graves disease, patient had been on Synthroid despite not undergoing any radioactive iodine treatments.  TSH was elevated with normal T4.  Levothyroxine was discontinued and discussed with Endo who agreed with discontinuation.  Endo also requested follow up TSH/FT4 in 2 weeks post discharge.  At this time patient is stable for discharge on oral Levaquin for 5d course.        Therefore, she is discharged in fair and stable condition to home with close outpatient follow-up.    The patient met 2-midnight criteria for an inpatient stay at the time of discharge.    Discharge Date  11/5/2018    FOLLOW UP ITEMS POST DISCHARGE  F/U with PCP in 1-2 weeks  Schedule appointment with Endocrine (Dr. Barksdale)    DISCHARGE DIAGNOSES  Principal Problem:    Healthcare-associated pneumonia POA: Yes  Active Problems:    Acute on chronic respiratory failure with hypoxia (HCC) POA: Yes    Chest pain POA: Yes    Microcytic anemia POA: Yes    Graves disease POA: Yes  Resolved Problems:    * No resolved hospital problems. *      FOLLOW UP  Future Appointments  Date Time Provider Department Center   12/17/2018 2:00 PM TEJAS Yang [facility] None   2/4/2019 1:00 PM  Geoffrey Booker M.D. [facility] None     No follow-up provider specified.    MEDICATIONS ON DISCHARGE     Medication List      START taking these medications      Instructions   ferrous sulfate 325 (65 Fe) MG tablet  Start taking on:  11/6/2018   Take 1 Tab by mouth every morning with breakfast.  Dose:  325 mg     levoFLOXacin 750 MG tablet  Start taking on:  11/6/2018  Commonly known as:  LEVAQUIN   Take 1 Tab by mouth every day for 5 days.  Dose:  750 mg        CONTINUE taking these medications      Instructions   * albuterol 108 (90 Base) MCG/ACT Aers inhalation aerosol  Commonly known as:  VENTOLIN HFA   Inhale 2 Puffs by mouth every four hours as needed for Shortness of Breath.  Dose:  2 Puff     * albuterol 2.5mg/3ml Nebu solution for nebulization  Commonly known as:  PROVENTIL   Use as needed     ALPRAZolam 1 MG Tabs  Commonly known as:  XANAX   Take 1 mg by mouth 3 times a day as needed. Indications: Feeling Anxious, Trouble Sleeping  Dose:  1 mg     aspirin EC 81 MG Tbec  Commonly known as:  ECOTRIN   Take 81 mg by mouth every day.  Dose:  81 mg     atorvastatin 40 MG Tabs  Commonly known as:  LIPITOR   Take 1 Tab by mouth every evening.  Dose:  40 mg     BREO ELLIPTA 100-25 MCG/INH Aepb  Generic drug:  Fluticasone Furoate-Vilanterol   Inhale 1 Puff by mouth every day.  Dose:  1 Puff     gabapentin 800 MG tablet  Commonly known as:  NEURONTIN   Take 800 mg by mouth every 8 hours.  Dose:  800 mg     omeprazole 20 MG delayed-release capsule  Commonly known as:  PRILOSEC   Take 20 mg by mouth 1 time daily as needed (Heartburn).  Dose:  20 mg     predniSONE 20 MG Tabs  Commonly known as:  DELTASONE   Take 60 mg by mouth every morning with breakfast.  Dose:  60 mg     traZODone 100 MG Tabs  Commonly known as:  DESYREL   Take 100 mg by mouth at bedtime as needed for Sleep.  Dose:  100 mg        * This list has 2 medication(s) that are the same as other medications prescribed for you. Read the directions carefully,  and ask your doctor or other care provider to review them with you.            STOP taking these medications    levothyroxine 125 MCG Tabs  Commonly known as:  SYNTHROID            Allergies  Allergies   Allergen Reactions   • Penicillins Anaphylaxis     Reported that her throat closes   • Sulfa Drugs Anaphylaxis     Reported that her throat closes   • Carbamazepine Rash and Swelling     .   • Cefadroxil      Pt is not sure, but knows that she has an allergy to this medication.   • Cephalexin Rash and Swelling     Full body sweeling and rash    • Methadone Swelling   • Mushroom Extract Complex Swelling   • Phenytoin      Swelling   • Rondec Rash and Swelling     .       DIET  Orders Placed This Encounter   Procedures   • Diet Order Regular     Standing Status:   Standing     Number of Occurrences:   1     Order Specific Question:   Diet:     Answer:   Regular [1]       ACTIVITY  As tolerated.  Weight bearing as tolerated    CONSULTATIONS  NONE    PROCEDURES  NONE    LABORATORY  Lab Results   Component Value Date    SODIUM 138 11/04/2018    POTASSIUM 4.3 11/04/2018    CHLORIDE 104 11/04/2018    CO2 23 11/04/2018    GLUCOSE 128 (H) 11/04/2018    BUN 14 11/04/2018    CREATININE 0.89 11/04/2018        Lab Results   Component Value Date    WBC 8.4 11/04/2018    HEMOGLOBIN 9.6 (L) 11/04/2018    HEMATOCRIT 30.2 (L) 11/04/2018    PLATELETCT 223 11/04/2018        Total time of the discharge process exceeds 45 minutes.

## 2018-11-05 NOTE — PROGRESS NOTES
Assumed care of patient. Assessment complete, tele in place. Continuing to monitor with hourly rounding.

## 2018-11-05 NOTE — PROGRESS NOTES
Assumed care of patient. Patient resting in bed at this time. Patient not showing any signs of obvious distress at this time. Safety precautions in place at this time.

## 2018-11-05 NOTE — RESPIRATORY CARE
COPD EDUCATION by COPD CLINICAL EDUCATOR  11/4/2018 at 4:07 PM by Soledad Mohan     Patient reviewed by COPD education team. Patient does not qualify for COPD program.

## 2018-11-05 NOTE — CARE PLAN
Problem: Infection  Goal: Will remain free from infection    Intervention: Assess signs and symptoms of infection  Assessed patients for signs/symptoms of infection.  Intervention: Implement standard precautions and perform hand washing before and after patient contact  Performed hand hygiene before and after all patient care.

## 2018-11-05 NOTE — CARE PLAN
Problem: Infection  Goal: Will remain free from infection  Outcome: PROGRESSING AS EXPECTED  Hand hygiene in place before and after pt contact.     Problem: Knowledge Deficit  Goal: Knowledge of disease process/condition, treatment plan, diagnostic tests, and medications will improve    Intervention: Explain information regarding disease process/condition, treatment plan, diagnostic tests, and medications and document in education  Pt educated on plan of care, medications, pain management, and disease processes. Pt verbalized understanding and was encouraged to ask questions. All questions answered.

## 2018-11-06 NOTE — DISCHARGE INSTRUCTIONS
Discharge Instructions    Discharged to home by car with relative. Discharged via wheelchair, hospital escort: Yes.  Special equipment needed: Not Applicable    Be sure to schedule a follow-up appointment with your primary care doctor or any specialists as instructed.     Discharge Plan:   Smoking Cessation Offered: Patient Counseled  Influenza Vaccine Indication: Not indicated: Previously immunized this influenza season and > 8 years of age    I understand that a diet low in cholesterol, fat, and sodium is recommended for good health. Unless I have been given specific instructions below for another diet, I accept this instruction as my diet prescription.   Other diet: regular    Special Instructions: None    · Is patient discharged on Warfarin / Coumadin?   No         Depression / Suicide Risk    As you are discharged from this [facility] facility, it is important to learn how to keep safe from harming yourself.    Recognize the warning signs:  · Abrupt changes in personality, positive or negative- including increase in energy   · Giving away possessions  · Change in eating patterns- significant weight changes-  positive or negative  · Change in sleeping patterns- unable to sleep or sleeping all the time   · Unwillingness or inability to communicate  · Depression  · Unusual sadness, discouragement and loneliness  · Talk of wanting to die  · Neglect of personal appearance   · Rebelliousness- reckless behavior  · Withdrawal from people/activities they love  · Confusion- inability to concentrate     If you or a loved one observes any of these behaviors or has concerns about self-harm, here's what you can do:  · Talk about it- your feelings and reasons for harming yourself  · Remove any means that you might use to hurt yourself (examples: pills, rope, extension cords, firearm)  · Get professional help from the community (Mental Health, Substance Abuse, psychological counseling)  · Do not be alone:Call your Safe  Contact- someone whom you trust who will be there for you.  · Call your local CRISIS HOTLINE 826-4292 or 452-385-5823  · Call your local Children's Mobile Crisis Response Team [place] (614) 797-7979 or www.[employer]  · Call the toll free National Suicide Prevention Hotlines   · National Suicide Prevention Lifeline 173-855-VEVA (1506)  · National Lovely Line Network 800-SUICIDE (906-4791)

## 2018-11-06 NOTE — PROGRESS NOTES
Reviewed discharge instructions with patient and family. Patient signed discharge instructions and left with . Iv's and tele box removed.

## 2018-11-21 ENCOUNTER — APPOINTMENT (OUTPATIENT)
Dept: RADIOLOGY | Facility: MEDICAL CENTER | Age: 43
End: 2018-11-21
Attending: NURSE PRACTITIONER
Payer: MEDICAID

## 2019-08-09 ENCOUNTER — APPOINTMENT (OUTPATIENT)
Dept: RADIOLOGY | Facility: MEDICAL CENTER | Age: 44
DRG: 195 | End: 2019-08-09
Attending: EMERGENCY MEDICINE

## 2019-08-09 ENCOUNTER — HOSPITAL ENCOUNTER (INPATIENT)
Facility: MEDICAL CENTER | Age: 44
LOS: 1 days | DRG: 195 | End: 2019-08-10
Attending: EMERGENCY MEDICINE | Admitting: INTERNAL MEDICINE

## 2019-08-09 DIAGNOSIS — R07.9 CHEST PAIN, UNSPECIFIED TYPE: ICD-10-CM

## 2019-08-09 PROBLEM — J18.9 PNEUMONIA: Status: ACTIVE | Noted: 2019-08-09

## 2019-08-09 LAB
ALBUMIN SERPL BCP-MCNC: 4.1 G/DL (ref 3.2–4.9)
ALBUMIN/GLOB SERPL: 1.9 G/DL
ALP SERPL-CCNC: 58 U/L (ref 30–99)
ALT SERPL-CCNC: 5 U/L (ref 2–50)
ANION GAP SERPL CALC-SCNC: 8 MMOL/L (ref 0–11.9)
AST SERPL-CCNC: 10 U/L (ref 12–45)
BASOPHILS # BLD AUTO: 0.3 % (ref 0–1.8)
BASOPHILS # BLD: 0.01 K/UL (ref 0–0.12)
BILIRUB SERPL-MCNC: 0.5 MG/DL (ref 0.1–1.5)
BUN SERPL-MCNC: 11 MG/DL (ref 8–22)
CALCIUM SERPL-MCNC: 8.9 MG/DL (ref 8.5–10.5)
CHLORIDE SERPL-SCNC: 104 MMOL/L (ref 96–112)
CO2 SERPL-SCNC: 24 MMOL/L (ref 20–33)
CREAT SERPL-MCNC: 0.74 MG/DL (ref 0.5–1.4)
D DIMER PPP IA.FEU-MCNC: 1.15 UG/ML (FEU) (ref 0–0.5)
EKG IMPRESSION: NORMAL
EOSINOPHIL # BLD AUTO: 0.03 K/UL (ref 0–0.51)
EOSINOPHIL NFR BLD: 0.9 % (ref 0–6.9)
ERYTHROCYTE [DISTWIDTH] IN BLOOD BY AUTOMATED COUNT: 38 FL (ref 35.9–50)
GLOBULIN SER CALC-MCNC: 2.2 G/DL (ref 1.9–3.5)
GLUCOSE SERPL-MCNC: 80 MG/DL (ref 65–99)
HCT VFR BLD AUTO: 30 % (ref 37–47)
HGB BLD-MCNC: 9.3 G/DL (ref 12–16)
IMM GRANULOCYTES # BLD AUTO: 0.01 K/UL (ref 0–0.11)
IMM GRANULOCYTES NFR BLD AUTO: 0.3 % (ref 0–0.9)
LYMPHOCYTES # BLD AUTO: 1.65 K/UL (ref 1–4.8)
LYMPHOCYTES NFR BLD: 47.3 % (ref 22–41)
MCH RBC QN AUTO: 21.1 PG (ref 27–33)
MCHC RBC AUTO-ENTMCNC: 31 G/DL (ref 33.6–35)
MCV RBC AUTO: 68 FL (ref 81.4–97.8)
MONOCYTES # BLD AUTO: 0.22 K/UL (ref 0–0.85)
MONOCYTES NFR BLD AUTO: 6.3 % (ref 0–13.4)
NEUTROPHILS # BLD AUTO: 1.57 K/UL (ref 2–7.15)
NEUTROPHILS NFR BLD: 44.9 % (ref 44–72)
NRBC # BLD AUTO: 0 K/UL
NRBC BLD-RTO: 0 /100 WBC
PLATELET # BLD AUTO: 142 K/UL (ref 164–446)
PMV BLD AUTO: 9.4 FL (ref 9–12.9)
POTASSIUM SERPL-SCNC: 3.7 MMOL/L (ref 3.6–5.5)
PROCALCITONIN SERPL-MCNC: <0.05 NG/ML
PROT SERPL-MCNC: 6.3 G/DL (ref 6–8.2)
RBC # BLD AUTO: 4.41 M/UL (ref 4.2–5.4)
SODIUM SERPL-SCNC: 136 MMOL/L (ref 135–145)
TROPONIN T SERPL-MCNC: <6 NG/L (ref 6–19)
WBC # BLD AUTO: 3.5 K/UL (ref 4.8–10.8)

## 2019-08-09 PROCEDURE — 87040 BLOOD CULTURE FOR BACTERIA: CPT | Mod: 91

## 2019-08-09 PROCEDURE — 96374 THER/PROPH/DIAG INJ IV PUSH: CPT

## 2019-08-09 PROCEDURE — 700111 HCHG RX REV CODE 636 W/ 250 OVERRIDE (IP): Performed by: INTERNAL MEDICINE

## 2019-08-09 PROCEDURE — 84484 ASSAY OF TROPONIN QUANT: CPT

## 2019-08-09 PROCEDURE — 93005 ELECTROCARDIOGRAM TRACING: CPT

## 2019-08-09 PROCEDURE — 93005 ELECTROCARDIOGRAM TRACING: CPT | Performed by: EMERGENCY MEDICINE

## 2019-08-09 PROCEDURE — 71275 CT ANGIOGRAPHY CHEST: CPT

## 2019-08-09 PROCEDURE — 96375 TX/PRO/DX INJ NEW DRUG ADDON: CPT

## 2019-08-09 PROCEDURE — 700102 HCHG RX REV CODE 250 W/ 637 OVERRIDE(OP): Performed by: INTERNAL MEDICINE

## 2019-08-09 PROCEDURE — 700111 HCHG RX REV CODE 636 W/ 250 OVERRIDE (IP): Performed by: EMERGENCY MEDICINE

## 2019-08-09 PROCEDURE — 700117 HCHG RX CONTRAST REV CODE 255: Performed by: EMERGENCY MEDICINE

## 2019-08-09 PROCEDURE — A9270 NON-COVERED ITEM OR SERVICE: HCPCS | Performed by: INTERNAL MEDICINE

## 2019-08-09 PROCEDURE — 80307 DRUG TEST PRSMV CHEM ANLYZR: CPT

## 2019-08-09 PROCEDURE — 85379 FIBRIN DEGRADATION QUANT: CPT

## 2019-08-09 PROCEDURE — 96376 TX/PRO/DX INJ SAME DRUG ADON: CPT

## 2019-08-09 PROCEDURE — 99223 1ST HOSP IP/OBS HIGH 75: CPT | Mod: 25 | Performed by: INTERNAL MEDICINE

## 2019-08-09 PROCEDURE — 85025 COMPLETE CBC W/AUTO DIFF WBC: CPT

## 2019-08-09 PROCEDURE — 80053 COMPREHEN METABOLIC PANEL: CPT

## 2019-08-09 PROCEDURE — 99285 EMERGENCY DEPT VISIT HI MDM: CPT

## 2019-08-09 PROCEDURE — 99407 BEHAV CHNG SMOKING > 10 MIN: CPT | Performed by: INTERNAL MEDICINE

## 2019-08-09 PROCEDURE — 36415 COLL VENOUS BLD VENIPUNCTURE: CPT

## 2019-08-09 PROCEDURE — 84145 PROCALCITONIN (PCT): CPT

## 2019-08-09 PROCEDURE — 71045 X-RAY EXAM CHEST 1 VIEW: CPT

## 2019-08-09 PROCEDURE — 770006 HCHG ROOM/CARE - MED/SURG/GYN SEMI*

## 2019-08-09 PROCEDURE — 700101 HCHG RX REV CODE 250: Performed by: INTERNAL MEDICINE

## 2019-08-09 PROCEDURE — 94760 N-INVAS EAR/PLS OXIMETRY 1: CPT

## 2019-08-09 RX ORDER — MORPHINE SULFATE 4 MG/ML
4 INJECTION, SOLUTION INTRAMUSCULAR; INTRAVENOUS
Status: DISCONTINUED | OUTPATIENT
Start: 2019-08-09 | End: 2019-08-10

## 2019-08-09 RX ORDER — AMOXICILLIN 250 MG
2 CAPSULE ORAL 2 TIMES DAILY
Status: DISCONTINUED | OUTPATIENT
Start: 2019-08-10 | End: 2019-08-10 | Stop reason: HOSPADM

## 2019-08-09 RX ORDER — MORPHINE SULFATE 10 MG/ML
6 INJECTION, SOLUTION INTRAMUSCULAR; INTRAVENOUS ONCE
Status: COMPLETED | OUTPATIENT
Start: 2019-08-09 | End: 2019-08-09

## 2019-08-09 RX ORDER — ONDANSETRON 2 MG/ML
4 INJECTION INTRAMUSCULAR; INTRAVENOUS EVERY 4 HOURS PRN
Status: DISCONTINUED | OUTPATIENT
Start: 2019-08-09 | End: 2019-08-10 | Stop reason: HOSPADM

## 2019-08-09 RX ORDER — ONDANSETRON 4 MG/1
4 TABLET, ORALLY DISINTEGRATING ORAL EVERY 4 HOURS PRN
Status: DISCONTINUED | OUTPATIENT
Start: 2019-08-09 | End: 2019-08-10 | Stop reason: HOSPADM

## 2019-08-09 RX ORDER — SODIUM CHLORIDE 9 MG/ML
INJECTION, SOLUTION INTRAVENOUS CONTINUOUS
Status: DISCONTINUED | OUTPATIENT
Start: 2019-08-09 | End: 2019-08-09

## 2019-08-09 RX ORDER — SODIUM CHLORIDE AND POTASSIUM CHLORIDE 150; 900 MG/100ML; MG/100ML
INJECTION, SOLUTION INTRAVENOUS CONTINUOUS
Status: DISCONTINUED | OUTPATIENT
Start: 2019-08-09 | End: 2019-08-10 | Stop reason: HOSPADM

## 2019-08-09 RX ORDER — ATORVASTATIN CALCIUM 20 MG/1
40 TABLET, FILM COATED ORAL EVERY EVENING
Status: DISCONTINUED | OUTPATIENT
Start: 2019-08-09 | End: 2019-08-09

## 2019-08-09 RX ORDER — GABAPENTIN 800 MG/1
800 TABLET ORAL EVERY 8 HOURS
Status: DISCONTINUED | OUTPATIENT
Start: 2019-08-09 | End: 2019-08-09

## 2019-08-09 RX ORDER — FERROUS SULFATE 325(65) MG
325 TABLET ORAL
Status: DISCONTINUED | OUTPATIENT
Start: 2019-08-10 | End: 2019-08-10 | Stop reason: HOSPADM

## 2019-08-09 RX ORDER — ACETAMINOPHEN 325 MG/1
650 TABLET ORAL EVERY 6 HOURS PRN
Status: DISCONTINUED | OUTPATIENT
Start: 2019-08-09 | End: 2019-08-10 | Stop reason: HOSPADM

## 2019-08-09 RX ORDER — PROMETHAZINE HYDROCHLORIDE 25 MG/1
12.5-25 TABLET ORAL EVERY 4 HOURS PRN
Status: DISCONTINUED | OUTPATIENT
Start: 2019-08-09 | End: 2019-08-10 | Stop reason: HOSPADM

## 2019-08-09 RX ORDER — OXYCODONE HYDROCHLORIDE 10 MG/1
10 TABLET ORAL
Status: DISCONTINUED | OUTPATIENT
Start: 2019-08-09 | End: 2019-08-10

## 2019-08-09 RX ORDER — POLYETHYLENE GLYCOL 3350 17 G/17G
1 POWDER, FOR SOLUTION ORAL
Status: DISCONTINUED | OUTPATIENT
Start: 2019-08-09 | End: 2019-08-10 | Stop reason: HOSPADM

## 2019-08-09 RX ORDER — FERROUS SULFATE 325(65) MG
325 TABLET ORAL EVERY MORNING
COMMUNITY
End: 2021-02-24

## 2019-08-09 RX ORDER — ONDANSETRON 2 MG/ML
4 INJECTION INTRAMUSCULAR; INTRAVENOUS ONCE
Status: COMPLETED | OUTPATIENT
Start: 2019-08-09 | End: 2019-08-09

## 2019-08-09 RX ORDER — TRAZODONE HYDROCHLORIDE 50 MG/1
100 TABLET ORAL NIGHTLY PRN
Status: DISCONTINUED | OUTPATIENT
Start: 2019-08-09 | End: 2019-08-09

## 2019-08-09 RX ORDER — ENALAPRILAT 1.25 MG/ML
1.25 INJECTION INTRAVENOUS EVERY 6 HOURS PRN
Status: DISCONTINUED | OUTPATIENT
Start: 2019-08-09 | End: 2019-08-10 | Stop reason: HOSPADM

## 2019-08-09 RX ORDER — BISACODYL 10 MG
10 SUPPOSITORY, RECTAL RECTAL
Status: DISCONTINUED | OUTPATIENT
Start: 2019-08-09 | End: 2019-08-10 | Stop reason: HOSPADM

## 2019-08-09 RX ORDER — ALPRAZOLAM 1 MG/1
1 TABLET ORAL 3 TIMES DAILY PRN
Status: DISCONTINUED | OUTPATIENT
Start: 2019-08-09 | End: 2019-08-10 | Stop reason: HOSPADM

## 2019-08-09 RX ORDER — OXYCODONE HYDROCHLORIDE 5 MG/1
5 TABLET ORAL
Status: DISCONTINUED | OUTPATIENT
Start: 2019-08-09 | End: 2019-08-10

## 2019-08-09 RX ORDER — IBUPROFEN 200 MG
800 TABLET ORAL EVERY 8 HOURS PRN
COMMUNITY
End: 2024-02-15

## 2019-08-09 RX ORDER — OMEPRAZOLE 20 MG/1
40 CAPSULE, DELAYED RELEASE ORAL 2 TIMES DAILY
Status: DISCONTINUED | OUTPATIENT
Start: 2019-08-09 | End: 2019-08-10 | Stop reason: HOSPADM

## 2019-08-09 RX ORDER — PROMETHAZINE HYDROCHLORIDE 25 MG/1
12.5-25 SUPPOSITORY RECTAL EVERY 4 HOURS PRN
Status: DISCONTINUED | OUTPATIENT
Start: 2019-08-09 | End: 2019-08-10 | Stop reason: HOSPADM

## 2019-08-09 RX ORDER — LEVOFLOXACIN 5 MG/ML
750 INJECTION, SOLUTION INTRAVENOUS EVERY 24 HOURS
Status: DISCONTINUED | OUTPATIENT
Start: 2019-08-09 | End: 2019-08-10

## 2019-08-09 RX ADMIN — OXYCODONE HYDROCHLORIDE 5 MG: 5 TABLET ORAL at 23:31

## 2019-08-09 RX ADMIN — ONDANSETRON 4 MG: 2 INJECTION INTRAMUSCULAR; INTRAVENOUS at 16:40

## 2019-08-09 RX ADMIN — IOHEXOL 75 ML: 350 INJECTION, SOLUTION INTRAVENOUS at 18:46

## 2019-08-09 RX ADMIN — POTASSIUM CHLORIDE AND SODIUM CHLORIDE: 900; 150 INJECTION, SOLUTION INTRAVENOUS at 22:33

## 2019-08-09 RX ADMIN — OMEPRAZOLE 40 MG: 20 CAPSULE, DELAYED RELEASE ORAL at 22:41

## 2019-08-09 RX ADMIN — MORPHINE SULFATE 6 MG: 10 INJECTION INTRAVENOUS at 16:40

## 2019-08-09 RX ADMIN — LEVOFLOXACIN 750 MG: 750 INJECTION, SOLUTION INTRAVENOUS at 22:33

## 2019-08-09 RX ADMIN — MORPHINE SULFATE 4 MG: 4 INJECTION INTRAVENOUS at 20:31

## 2019-08-09 SDOH — HEALTH STABILITY: MENTAL HEALTH: HOW OFTEN DO YOU HAVE A DRINK CONTAINING ALCOHOL?: NEVER

## 2019-08-09 SDOH — HEALTH STABILITY: MENTAL HEALTH: HOW OFTEN DO YOU HAVE 6 OR MORE DRINKS ON ONE OCCASION?: NEVER

## 2019-08-09 ASSESSMENT — LIFESTYLE VARIABLES
DO YOU DRINK ALCOHOL: NO
CONSUMPTION TOTAL: INCOMPLETE
HAVE PEOPLE ANNOYED YOU BY CRITICIZING YOUR DRINKING: NO
TOTAL SCORE: 0
ALCOHOL_USE: NO
HAVE YOU EVER FELT YOU SHOULD CUT DOWN ON YOUR DRINKING: NO
DOES PATIENT WANT TO STOP DRINKING: NO
EVER_SMOKED: YES
DOES PATIENT WANT TO STOP DRINKING: NO
TOTAL SCORE: 0
EVER FELT BAD OR GUILTY ABOUT YOUR DRINKING: NO
EVER HAD A DRINK FIRST THING IN THE MORNING TO STEADY YOUR NERVES TO GET RID OF A HANGOVER: NO
TOTAL SCORE: 0
HAVE PEOPLE ANNOYED YOU BY CRITICIZING YOUR DRINKING: NO
HAVE YOU EVER FELT YOU SHOULD CUT DOWN ON YOUR DRINKING: NO
EVER_SMOKED: YES
EVER FELT BAD OR GUILTY ABOUT YOUR DRINKING: NO
CONSUMPTION TOTAL: INCOMPLETE
EVER HAD A DRINK FIRST THING IN THE MORNING TO STEADY YOUR NERVES TO GET RID OF A HANGOVER: NO
TOTAL SCORE: 0
ALCOHOL_USE: NO

## 2019-08-09 ASSESSMENT — COPD QUESTIONNAIRES
HAVE YOU SMOKED AT LEAST 100 CIGARETTES IN YOUR ENTIRE LIFE: YES
IN THE PAST 12 MONTHS DO YOU DO LESS THAN YOU USED TO BECAUSE OF YOUR BREATHING PROBLEMS: DISAGREE/UNSURE
DURING THE PAST 4 WEEKS HOW MUCH DID YOU FEEL SHORT OF BREATH: SOME OF THE TIME
DO YOU EVER COUGH UP ANY MUCUS OR PHLEGM?: NO/ONLY WITH OCCASIONAL COLDS OR INFECTIONS
COPD SCREENING SCORE: 3

## 2019-08-09 ASSESSMENT — ENCOUNTER SYMPTOMS
FALLS: 0
COUGH: 1
MYALGIAS: 0
DIZZINESS: 0
STRIDOR: 0
NAUSEA: 0
CHILLS: 0
HEADACHES: 0
VOMITING: 0
DIARRHEA: 0
DEPRESSION: 0
LOSS OF CONSCIOUSNESS: 0
TINGLING: 0
ABDOMINAL PAIN: 0
FEVER: 1
PALPITATIONS: 0
WEAKNESS: 0
CONSTIPATION: 0
SHORTNESS OF BREATH: 1
SPUTUM PRODUCTION: 0

## 2019-08-09 ASSESSMENT — PATIENT HEALTH QUESTIONNAIRE - PHQ9
1. LITTLE INTEREST OR PLEASURE IN DOING THINGS: NEARLY EVERY DAY
3. TROUBLE FALLING OR STAYING ASLEEP OR SLEEPING TOO MUCH: SEVERAL DAYS
6. FEELING BAD ABOUT YOURSELF - OR THAT YOU ARE A FAILURE OR HAVE LET YOURSELF OR YOUR FAMILY DOWN: NOT AL ALL
2. FEELING DOWN, DEPRESSED, IRRITABLE, OR HOPELESS: MORE THAN HALF THE DAYS
9. THOUGHTS THAT YOU WOULD BE BETTER OFF DEAD, OR OF HURTING YOURSELF: NOT AT ALL
SUM OF ALL RESPONSES TO PHQ9 QUESTIONS 1 AND 2: 5
8. MOVING OR SPEAKING SO SLOWLY THAT OTHER PEOPLE COULD HAVE NOTICED. OR THE OPPOSITE, BEING SO FIGETY OR RESTLESS THAT YOU HAVE BEEN MOVING AROUND A LOT MORE THAN USUAL: SEVERAL DAYS
5. POOR APPETITE OR OVEREATING: SEVERAL DAYS
SUM OF ALL RESPONSES TO PHQ QUESTIONS 1-9: 9
4. FEELING TIRED OR HAVING LITTLE ENERGY: SEVERAL DAYS
7. TROUBLE CONCENTRATING ON THINGS, SUCH AS READING THE NEWSPAPER OR WATCHING TELEVISION: NOT AT ALL

## 2019-08-09 ASSESSMENT — COGNITIVE AND FUNCTIONAL STATUS - GENERAL
CLIMB 3 TO 5 STEPS WITH RAILING: A LITTLE
MOVING FROM LYING ON BACK TO SITTING ON SIDE OF FLAT BED: A LITTLE
TOILETING: A LITTLE
SUGGESTED CMS G CODE MODIFIER DAILY ACTIVITY: CI
MOBILITY SCORE: 20
STANDING UP FROM CHAIR USING ARMS: A LITTLE
SUGGESTED CMS G CODE MODIFIER MOBILITY: CJ
MOVING TO AND FROM BED TO CHAIR: A LITTLE
DAILY ACTIVITIY SCORE: 23

## 2019-08-09 NOTE — ED PROVIDER NOTES
ED Provider Note    Scribed for Jeovanny Bartlett M.D. by Jose Islas. 8/9/2019  3:42 PM    Primary care provider: TEJAS Mata  Means of arrival: Jose  History obtained from: Patient  History limited by: None    CHIEF COMPLAINT  Chief Complaint   Patient presents with   • Chest Pain       HPI  Madelyn Robb is a 44 y.o. female who presents to the Emergency Department complaining of chest pain onset 3 days ago. The patient reports that she has been experiencing general fatigue at work for the last 3 days. She also began experiencing a central chest pain which radiates into her left ribs which worsens with walking and light pressure. She also endorses associated intermittent shortness of breath and nausea. She denies any cough, however. 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 She also notes that her aunt had a stroke under the age of 65 and her father passed away recently from esophageal cancer. She smokes cigarettes and uses marijuana occasionally.    REVIEW OF SYSTEMS  Pertinent positives include chest pain, general fatigue, shortness of breath, nausea. Pertinent negatives include no cough.  All other systems reviewed and negative.    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    SURGICAL HISTORY   has a past surgical history that includes open  reduction; appendectomy; abdominal exploration; cholecystectomy; and tubal coagulation laparoscopic bilateral.    SOCIAL HISTORY  Social History     Tobacco Use   • Smoking status: Current Every Day Smoker     Packs/day: 0.25     Years: 25.00     Pack years: 6.25   • Smokeless tobacco: Never Used   • Tobacco comment: Average 3 cigs per day.   Substance Use Topics   • Alcohol use: No     Comment: denies h/o heavy use   • Drug use: Yes     Types: Marijuana, Inhaled     Comment: pot      Social History     Substance and Sexual Activity   Drug Use Yes   • Types: Marijuana, Inhaled    Comment: pot       FAMILY HISTORY  Family History   Problem Relation Age of Onset   • Psychiatric Illness Mother    • Alcohol/Drug Mother    • Hypertension Mother    • Cancer Father        CURRENT MEDICATIONS    Current Facility-Administered Medications:   •  ALPRAZolam (XANAX) tablet 1 mg, 1 mg, Oral, TID PRN, ROSA CorreiaO.  •  [START ON 8/10/2019] aspirin EC (ECOTRIN) tablet 81 mg, 81 mg, Oral, DAILY, ROSA CorreiaO.  •  atorvastatin (LIPITOR) tablet 40 mg, 40 mg, Oral, Q EVENING, ROSA CorreiaO.  •  [START ON 8/10/2019] ferrous sulfate tablet 325 mg, 325 mg, Oral, QDAY with Breakfast, ROSA CorreiaO.  •  gabapentin (NEURONTIN) tablet 800 mg, 800 mg, Oral, Q8HRS, PIERO Correia.O.  •  omeprazole (PRILOSEC) capsule 20 mg, 20 mg, Oral, QDAY PRN, ROSA CorreiaO.  •  traZODone (DESYREL) tablet 100 mg, 100 mg, Oral, HS PRN, PIERO Correia.O.  •  senna-docusate (PERICOLACE or SENOKOT S) 8.6-50 MG per tablet 2 Tab, 2 Tab, Oral, BID **AND** polyethylene glycol/lytes (MIRALAX) PACKET 1 Packet, 1 Packet, Oral, QDAY PRN **AND** magnesium hydroxide (MILK OF MAGNESIA) suspension 30 mL, 30 mL, Oral, QDAY PRN **AND** bisacodyl (DULCOLAX) suppository 10 mg, 10 mg, Rectal, QDAY PRN, ROSA CorreiaO.  •  Respiratory Care per Protocol, , Nebulization, Continuous RT, Alex Olivares D.O.  •  Respiratory Care per  Protocol, , Nebulization, Continuous RT, Alex Olivares D.O.  •  [START ON 8/10/2019] enoxaparin (LOVENOX) inj 40 mg, 40 mg, Subcutaneous, DAILY, Alex Olivares D.O.  •  acetaminophen (TYLENOL) tablet 650 mg, 650 mg, Oral, Q6HRS PRN, Alex Olivares D.O.  •  Notify provider if pain remains uncontrolled, , , CONTINUOUS **AND** Use the numeric rating scale (NRS-11) on regular floors and Critical-Care Pain Observation Tool (CPOT) on ICUs/Trauma to assess pain, , , CONTINUOUS **AND** Pulse Ox (Oximetry), , , CONTINUOUS **AND** Pharmacy Consult Request ...Pain Management Review 1 Each, 1 Each, Other, PHARMACY TO DOSE **AND** If patient difficult to arouse and/or has respiratory depression, stop any opiates that are currently infusing and call a Rapid Response., , , CONTINUOUS **AND** oxyCODONE immediate-release (ROXICODONE) tablet 5 mg, 5 mg, Oral, Q3HRS PRN **AND** oxyCODONE immediate release (ROXICODONE) tablet 10 mg, 10 mg, Oral, Q3HRS PRN **AND** morphine (pf) 4 mg/ml injection 4 mg, 4 mg, Intravenous, Q3HRS PRN, Alex Olivares D.O.  •  levoFLOXacin in D5W (LEVAQUIN) IVPB 750 mg, 750 mg, Intravenous, Q24HRS, Alex Olivares D.O.  •  enalaprilat (VASOTEC) injection 1.25 mg, 1.25 mg, Intravenous, Q6HRS PRN, Alex Olivares D.O.  •  ondansetron (ZOFRAN) syringe/vial injection 4 mg, 4 mg, Intravenous, Q4HRS PRN, Alex Olivares D.O.  •  ondansetron (ZOFRAN ODT) dispertab 4 mg, 4 mg, Oral, Q4HRS PRN, Alex Olivares D.O.  •  promethazine (PHENERGAN) tablet 12.5-25 mg, 12.5-25 mg, Oral, Q4HRS PRN, Alex R Olivares, D.O.  •  promethazine (PHENERGAN) suppository 12.5-25 mg, 12.5-25 mg, Rectal, Q4HRS PRN, Alex Olivares D.O.  •  prochlorperazine (COMPAZINE) injection 5-10 mg, 5-10 mg, Intravenous, Q4HRS PRN, Alex Olivares D.O.  •  0.9 % NaCl with KCl 20 mEq infusion, , Intravenous, Continuous, Alex Olivares D.O.    Current Outpatient Medications:   •  ferrous sulfate 325 (65 Fe) MG tablet, Take 1 Tab by  "mouth every morning with breakfast., Disp: 30 Tab, Rfl: 0  •  aspirin EC (ECOTRIN) 81 MG Tablet Delayed Response, Take 81 mg by mouth every day., Disp: , Rfl:   •  gabapentin (NEURONTIN) 800 MG tablet, Take 800 mg by mouth every 8 hours., Disp: , Rfl:   •  predniSONE (DELTASONE) 20 MG Tab, Take 60 mg by mouth every morning with breakfast., Disp: , Rfl:   •  albuterol (PROVENTIL) 2.5mg/3ml Nebu Soln solution for nebulization, Use as needed, Disp: , Rfl:   •  albuterol (VENTOLIN HFA) 108 (90 Base) MCG/ACT Aero Soln inhalation aerosol, Inhale 2 Puffs by mouth every four hours as needed for Shortness of Breath., Disp: 1 Inhaler, Rfl: 0  •  atorvastatin (LIPITOR) 40 MG Tab, Take 1 Tab by mouth every evening., Disp: 30 Tab, Rfl: 1  •  Fluticasone Furoate-Vilanterol (BREO ELLIPTA) 100-25 MCG/INH AEROSOL POWDER, BREATH ACTIVATED, Inhale 1 Puff by mouth every day., Disp: , Rfl:   •  omeprazole (PRILOSEC) 20 MG delayed-release capsule, Take 20 mg by mouth 1 time daily as needed (Heartburn)., Disp: , Rfl:   •  traZODone (DESYREL) 100 MG Tab, Take 100 mg by mouth at bedtime as needed for Sleep., Disp: , Rfl:   •  alprazolam (XANAX) 1 MG TABS, Take 1 mg by mouth 3 times a day as needed. Indications: Feeling Anxious, Trouble Sleeping, Disp: , Rfl:     ALLERGIES  Allergies   Allergen Reactions   • Penicillins Anaphylaxis     Reported that her throat closes   • Sulfa Drugs Anaphylaxis     Reported that her throat closes   • Carbamazepine Rash and Swelling     .   • Cefadroxil      Pt is not sure, but knows that she has an allergy to this medication.   • Cephalexin Rash and Swelling     Full body sweeling and rash    • Methadone Swelling   • Mushroom Extract Complex Swelling   • Phenytoin      Swelling   • Rondec Rash and Swelling     .       PHYSICAL EXAM  VITAL SIGNS: BP (!) 81/55   Pulse (!) 55   Resp 13   Ht 1.727 m (5' 8\")   Wt 70.3 kg (155 lb)   LMP 01/17/2015   SpO2 92%   BMI 23.57 kg/m²     Vital signs " reviewed.  Constitutional: Smells of tobacco.   Head: Normocephalic.   Mouth/Throat: Oropharynx is clear and moist.   Eyes: EOM are normal. Pupils are equal, round, and reactive to light.   Neck: Normal range of motion. Neck supple.   Cardiovascular: Normal rate, regular rhythm and normal heart sounds.    Pulmonary/Chest: Partial left-sided chest wall tenderness.   Abdominal: Soft. There is no tenderness. There is no rebound and no guarding.   Musculoskeletal: Exhibits no edema.   Lymphadenopathy: No cervical adenopathy.   Neurological: Patient is alert and oriented to person, place, and time. CNs II - XII intact. DTRs intact. Normal sensation and strength.  Skin: Skin is warm and dry.   Psychiatric: Patient has a normal mood and affect. Behavior is normal.     LABS  Results for orders placed or performed during the hospital encounter of 08/09/19   CBC with Differential   Result Value Ref Range    WBC 3.5 (L) 4.8 - 10.8 K/uL    RBC 4.41 4.20 - 5.40 M/uL    Hemoglobin 9.3 (L) 12.0 - 16.0 g/dL    Hematocrit 30.0 (L) 37.0 - 47.0 %    MCV 68.0 (L) 81.4 - 97.8 fL    MCH 21.1 (L) 27.0 - 33.0 pg    MCHC 31.0 (L) 33.6 - 35.0 g/dL    RDW 38.0 35.9 - 50.0 fL    Platelet Count 142 (L) 164 - 446 K/uL    MPV 9.4 9.0 - 12.9 fL    Neutrophils-Polys 44.90 44.00 - 72.00 %    Lymphocytes 47.30 (H) 22.00 - 41.00 %    Monocytes 6.30 0.00 - 13.40 %    Eosinophils 0.90 0.00 - 6.90 %    Basophils 0.30 0.00 - 1.80 %    Immature Granulocytes 0.30 0.00 - 0.90 %    Nucleated RBC 0.00 /100 WBC    Neutrophils (Absolute) 1.57 (L) 2.00 - 7.15 K/uL    Lymphs (Absolute) 1.65 1.00 - 4.80 K/uL    Monos (Absolute) 0.22 0.00 - 0.85 K/uL    Eos (Absolute) 0.03 0.00 - 0.51 K/uL    Baso (Absolute) 0.01 0.00 - 0.12 K/uL    Immature Granulocytes (abs) 0.01 0.00 - 0.11 K/uL    NRBC (Absolute) 0.00 K/uL   Complete Metabolic Panel (CMP)   Result Value Ref Range    Sodium 136 135 - 145 mmol/L    Potassium 3.7 3.6 - 5.5 mmol/L    Chloride 104 96 - 112 mmol/L     Co2 24 20 - 33 mmol/L    Anion Gap 8.0 0.0 - 11.9    Glucose 80 65 - 99 mg/dL    Bun 11 8 - 22 mg/dL    Creatinine 0.74 0.50 - 1.40 mg/dL    Calcium 8.9 8.5 - 10.5 mg/dL    AST(SGOT) 10 (L) 12 - 45 U/L    ALT(SGPT) 5 2 - 50 U/L    Alkaline Phosphatase 58 30 - 99 U/L    Total Bilirubin 0.5 0.1 - 1.5 mg/dL    Albumin 4.1 3.2 - 4.9 g/dL    Total Protein 6.3 6.0 - 8.2 g/dL    Globulin 2.2 1.9 - 3.5 g/dL    A-G Ratio 1.9 g/dL   Troponin   Result Value Ref Range    Troponin T <6 6 - 19 ng/L   D-DIMER   Result Value Ref Range    D-Dimer Screen 1.15 (H) 0.00 - 0.50 ug/mL (FEU)   ESTIMATED GFR   Result Value Ref Range    GFR If African American >60 >60 mL/min/1.73 m 2    GFR If Non African American >60 >60 mL/min/1.73 m 2   EKG   Result Value Ref Range    Report       [facility]    Test Date:  2019  Pt Name:    DENA CASILLAS              Department: ER  MRN:        [id number]                      Room:       RD 09  Gender:     Female                       Technician: [id number]  :        1975                   Requested By:ER TRIAGE PROTOCOL  Order #:    [id number]                    Reading MD:    Measurements  Intervals                                Axis  Rate:       63                           P:          42  VA:         160                          QRS:        46  QRSD:       96                           T:          11  QT:         448  QTc:        459    Interpretive Statements  SINUS RHYTHM  RSR' IN V1 OR V2, RIGHT VCD OR RVH  Compared to ECG 2018 12:55:24  Right ventricular hypertrophy now present  RSR' in V1 or V2 now present     EKG (NOW)   Result Value Ref Range    Report       [facility]    Test Date:  2019  Pt Name:    DENA CASILLAS              Department: ER  MRN:        [id number]                      Room:       RD 09  Gender:     Female                       Technician: [id number]  :        1975                    Requested By:RENARD BROWN  Order #:    [id number]                    Reading MD:    Measurements  Intervals                                Axis  Rate:       53                           P:          30  MN:         164                          QRS:        55  QRSD:       100                          T:          25  QT:         480  QTc:        451    Interpretive Statements  SINUS BRADYCARDIA  RSR' IN V1 OR V2, PROBABLY NORMAL VARIANT  BORDERLINE T ABNORMALITIES, ANTERIOR LEADS  Compared to ECG 08/09/2019 15:34:07  T-wave abnormality now present  Sinus rhythm no longer present  Right ventricular hypertrophy no longer present       All labs reviewed by me.    EKG  12 Lead EKG interpreted by me to show sinus rhythm at 63. Normal P waves. Normal QRS. Normal ST segments. T-wave inversions in V1, V2, and V3.    Repeat EKG  Sinus bradycardia. Normal P waves. Normal QRS. Flattened T waves in V1, V2, and V3. Abnormal EKG.    RADIOLOGY  CT-CTA CHEST PULMONARY ARTERY W/ RECONS   Final Result      1.  There is no CT evidence of acute pulmonary embolism.   2.  There are patchy bilateral groundglass opacities most prominent in the right upper lobe. The appearance is most suggestive of an infectious or inflammatory etiology.   3.  There is no significant adenopathy.            DX-CHEST-PORTABLE (1 VIEW)   Final Result      1.  There is no acute cardiopulmonary process.        The radiologist's interpretation of all radiological studies have been reviewed by me.    COURSE & MEDICAL DECISION MAKING  Pertinent Labs & Imaging studies reviewed. (See chart for details) The patient's [facility] and past medical  records were reviewed    3:42 PM - Patient seen and examined at bedside. Ordered DX-Chest, POC UA, POC Urine pregnancy, D-Dimer, Estimated GFR, CBC Diff, CMP, Troponin, and EKG. The differential diagnoses include but are not limited to: Pneumonia, angina, pulmonary embolus     4:35 PM - Patient is complaining of pain and  nausea at this time. Ordered morphine 10 mg and Zofran 4 mg.    5:29 PM - Ordered CT-CTA Chest Pulmonary artery.  CT scan was negative for pulmonary embolus but concerning for atypical pneumonia.  Patient will be admitted to the hospitalist    7:19 PM - Patient was reevaluated at bedside. Discussed lab and radiology results with the patient and informed them that her initial blood work did not indicate any evidence of myocardial infarction. Noted that some evidence for pneumonia is present. Also discussed low blood pressure, but patient notes that this is normal.     7:39 PM - I discussed the patient's case and the above findings with Dr. Olivares (Hospitalist) who agrees to admit the patient.      DISPOSITION:  Patient will be admitted to Dr. Olivares (Hospitalist) in guarded condition.    FINAL IMPRESSION  1. Chest pain, unspecified type          I, Jose Islas (Scribe), am scribing for, and in the presence of, Jeovanny Bartlett M.D..    Electronically signed by: Jose Islas (Cherrieibe), 8/9/2019    I, Jeovanny Bartlett M.D. personally performed the services described in this documentation, as scribed by Jose Islas in my presence, and it is both accurate and complete.    C    The note accurately reflects work and decisions made by me.  Jeovanny Bartlett  8/9/2019  8:00 PM

## 2019-08-09 NOTE — ED TRIAGE NOTES
"EMS reports \"She states she has had pain for 3 days with SOB. Hx of MI.\" Pt states \"I have had CP x3 days and I have been really tired and weak with SOB.\" Pt reports pain of 8 out of 10. Pt hooked to monitor. Pt denies any further needs at this time. Call light within reach. Bed in low locked position. Comfort measures provided.     "

## 2019-08-10 VITALS
WEIGHT: 150.35 LBS | DIASTOLIC BLOOD PRESSURE: 67 MMHG | OXYGEN SATURATION: 97 % | SYSTOLIC BLOOD PRESSURE: 98 MMHG | BODY MASS INDEX: 22.79 KG/M2 | RESPIRATION RATE: 17 BRPM | HEIGHT: 68 IN | TEMPERATURE: 97 F | HEART RATE: 60 BPM

## 2019-08-10 LAB
AMPHET UR QL SCN: NEGATIVE
ANION GAP SERPL CALC-SCNC: 7 MMOL/L (ref 0–11.9)
BARBITURATES UR QL SCN: NEGATIVE
BENZODIAZ UR QL SCN: POSITIVE
BUN SERPL-MCNC: 10 MG/DL (ref 8–22)
BZE UR QL SCN: NEGATIVE
CALCIUM SERPL-MCNC: 8.9 MG/DL (ref 8.5–10.5)
CANNABINOIDS UR QL SCN: POSITIVE
CHLORIDE SERPL-SCNC: 104 MMOL/L (ref 96–112)
CO2 SERPL-SCNC: 27 MMOL/L (ref 20–33)
CREAT SERPL-MCNC: 0.79 MG/DL (ref 0.5–1.4)
CRP SERPL HS-MCNC: 0.69 MG/DL (ref 0–0.75)
ERYTHROCYTE [DISTWIDTH] IN BLOOD BY AUTOMATED COUNT: 38.4 FL (ref 35.9–50)
ERYTHROCYTE [SEDIMENTATION RATE] IN BLOOD BY WESTERGREN METHOD: 5 MM/HOUR (ref 0–20)
ETHANOL BLD-MCNC: 0 G/DL
GLUCOSE SERPL-MCNC: 79 MG/DL (ref 65–99)
HCT VFR BLD AUTO: 30.5 % (ref 37–47)
HGB BLD-MCNC: 9.5 G/DL (ref 12–16)
MCH RBC QN AUTO: 21.3 PG (ref 27–33)
MCHC RBC AUTO-ENTMCNC: 31.1 G/DL (ref 33.6–35)
MCV RBC AUTO: 68.4 FL (ref 81.4–97.8)
METHADONE UR QL SCN: NEGATIVE
OPIATES UR QL SCN: POSITIVE
OXYCODONE UR QL SCN: POSITIVE
PCP UR QL SCN: NEGATIVE
PLATELET # BLD AUTO: 146 K/UL (ref 164–446)
PMV BLD AUTO: 9.5 FL (ref 9–12.9)
POTASSIUM SERPL-SCNC: 3.6 MMOL/L (ref 3.6–5.5)
PROPOXYPH UR QL SCN: NEGATIVE
RBC # BLD AUTO: 4.46 M/UL (ref 4.2–5.4)
SODIUM SERPL-SCNC: 138 MMOL/L (ref 135–145)
WBC # BLD AUTO: 3.6 K/UL (ref 4.8–10.8)

## 2019-08-10 PROCEDURE — 99239 HOSP IP/OBS DSCHRG MGMT >30: CPT | Performed by: INTERNAL MEDICINE

## 2019-08-10 PROCEDURE — 36415 COLL VENOUS BLD VENIPUNCTURE: CPT

## 2019-08-10 PROCEDURE — 86140 C-REACTIVE PROTEIN: CPT

## 2019-08-10 PROCEDURE — 700102 HCHG RX REV CODE 250 W/ 637 OVERRIDE(OP): Performed by: INTERNAL MEDICINE

## 2019-08-10 PROCEDURE — 80307 DRUG TEST PRSMV CHEM ANLYZR: CPT

## 2019-08-10 PROCEDURE — 700111 HCHG RX REV CODE 636 W/ 250 OVERRIDE (IP): Performed by: INTERNAL MEDICINE

## 2019-08-10 PROCEDURE — 302152 K-PAD 12X17: Performed by: INTERNAL MEDICINE

## 2019-08-10 PROCEDURE — 85652 RBC SED RATE AUTOMATED: CPT

## 2019-08-10 PROCEDURE — A9270 NON-COVERED ITEM OR SERVICE: HCPCS | Performed by: INTERNAL MEDICINE

## 2019-08-10 PROCEDURE — 80048 BASIC METABOLIC PNL TOTAL CA: CPT

## 2019-08-10 PROCEDURE — 85027 COMPLETE CBC AUTOMATED: CPT

## 2019-08-10 PROCEDURE — 302131 K PAD MOTOR: Performed by: INTERNAL MEDICINE

## 2019-08-10 RX ORDER — LEVOFLOXACIN 750 MG/1
750 TABLET, FILM COATED ORAL DAILY
Status: DISCONTINUED | OUTPATIENT
Start: 2019-08-11 | End: 2019-08-10 | Stop reason: HOSPADM

## 2019-08-10 RX ORDER — KETOROLAC TROMETHAMINE 30 MG/ML
30 INJECTION, SOLUTION INTRAMUSCULAR; INTRAVENOUS EVERY 6 HOURS PRN
Status: DISCONTINUED | OUTPATIENT
Start: 2019-08-10 | End: 2019-08-10 | Stop reason: HOSPADM

## 2019-08-10 RX ORDER — OXYCODONE HYDROCHLORIDE 5 MG/1
5 TABLET ORAL EVERY 6 HOURS PRN
Status: DISCONTINUED | OUTPATIENT
Start: 2019-08-10 | End: 2019-08-10 | Stop reason: HOSPADM

## 2019-08-10 RX ORDER — ACETAMINOPHEN 325 MG/1
650 TABLET ORAL EVERY 6 HOURS PRN
Qty: 100 TAB | Refills: 0 | Status: SHIPPED | OUTPATIENT
Start: 2019-08-10 | End: 2024-02-15

## 2019-08-10 RX ORDER — DOXYCYCLINE 100 MG/1
100 CAPSULE ORAL 2 TIMES DAILY
Qty: 14 CAP | Refills: 0 | Status: SHIPPED | OUTPATIENT
Start: 2019-08-10 | End: 2019-08-17

## 2019-08-10 RX ADMIN — SENNOSIDES, DOCUSATE SODIUM 2 TABLET: 50; 8.6 TABLET, FILM COATED ORAL at 05:47

## 2019-08-10 RX ADMIN — ENOXAPARIN SODIUM 40 MG: 100 INJECTION SUBCUTANEOUS at 05:47

## 2019-08-10 RX ADMIN — OXYCODONE HYDROCHLORIDE 5 MG: 5 TABLET ORAL at 04:02

## 2019-08-10 RX ADMIN — MORPHINE SULFATE 4 MG: 4 INJECTION INTRAVENOUS at 00:57

## 2019-08-10 RX ADMIN — MORPHINE SULFATE 4 MG: 4 INJECTION INTRAVENOUS at 08:33

## 2019-08-10 RX ADMIN — ALPRAZOLAM 1 MG: 1 TABLET ORAL at 09:15

## 2019-08-10 RX ADMIN — FERROUS SULFATE TAB 325 MG (65 MG ELEMENTAL FE) 325 MG: 325 (65 FE) TAB at 08:33

## 2019-08-10 RX ADMIN — ALPRAZOLAM 1 MG: 1 TABLET ORAL at 02:44

## 2019-08-10 RX ADMIN — ASPIRIN 81 MG: 81 TABLET, COATED ORAL at 05:47

## 2019-08-10 RX ADMIN — MORPHINE SULFATE 4 MG: 4 INJECTION INTRAVENOUS at 05:43

## 2019-08-10 RX ADMIN — OXYCODONE HYDROCHLORIDE 5 MG: 5 TABLET ORAL at 07:50

## 2019-08-10 RX ADMIN — OMEPRAZOLE 40 MG: 20 CAPSULE, DELAYED RELEASE ORAL at 05:47

## 2019-08-10 NOTE — ASSESSMENT & PLAN NOTE
-CTA of the chest, noted bilateral pneumonia, neg for PE  -Due to her allergy profile, on IV Levaquin  -blood cx neg  - bp low normal  - transition to po abx    On RA  ProCal neg  -Not causing sepsis   -this is causing a mild leukopenia

## 2019-08-10 NOTE — PROGRESS NOTES
2RN skin check complete with NICK Anderson .  Devices in place: peripheral IV  Skin assessed under devices yes.  Confirmed pressure ulcers found on N/A  New potential pressure ulcers noted on N/A   Wound consult placed? N/A. Photo uploaded? N/A  The following interventions are in place patient is able to turn and ambulate self. She is continent. Pillows used for positioning.

## 2019-08-10 NOTE — ASSESSMENT & PLAN NOTE
-Continue iron supplementation  -She did have iron levels last year, they were normal, no iron deficiency anemia  -No sign of gross bleeding    8/10 h/h stable

## 2019-08-10 NOTE — CARE PLAN
Problem: Communication  Goal: The ability to communicate needs accurately and effectively will improve  Outcome: PROGRESSING AS EXPECTED    Patient is able to make needs known.      Problem: Infection  Goal: Will remain free from infection  Outcome: PROGRESSING AS EXPECTED    No signs of fever noted. WBC count is WNL.

## 2019-08-10 NOTE — PROGRESS NOTES
Assumed care of patient around 0240. Received report from Michelle ROMERO. Patient resting comfortably in bed. Patient requested PRN Xanax, which was given shortly after assuming care. Patient vented frustrations about waiting for long periods of time for pain meds, which were given to her in a timely manner by previous RN. She kept asking for a new IV despite current IV flushing/infusing without any issues. Patient agreeable to see how IV does in the AM before attempting to replace it. Welcome packet and snacks given to patient. Bed is in low and locked position. Non-slip socks are in place. Call light is within reach.

## 2019-08-10 NOTE — PROGRESS NOTES
Patient is continuously asking for pain medication and when next dose is available despite writing down time of next dose for patient. Patient is still asking for IV to be replaced and suggested only areas above her AC. She even suggested her chest and pulled out her left breast suggesting that she has great veins there.

## 2019-08-10 NOTE — ASSESSMENT & PLAN NOTE
- due to pneumonia, not cardiac in nature  - EKG, was similar to previous  -Troponin was negative, chest pain began after her other symptoms  -She did have a negative stress test in November, no need for additional work-up    TN-t neg    8/10 pleuritic CP  Avoid narcotics  Start toradol

## 2019-08-10 NOTE — ASSESSMENT & PLAN NOTE
-Tobacco cessation counseling and education provided for more than 10 minutes. Nicotine replacement options provided including patch, and further medical treatments including Wellbutrin and chantix.  As well as over the counter options of lozenges and gum.  -she denied the need for patch or gum    8/10 UDS- + bzd, cbd, opiates, oxycodone  Avoid narcotics

## 2019-08-10 NOTE — DISCHARGE INSTRUCTIONS
Discharge Instructions    Discharged to home by car with relative. Discharged via ambulance, hospital escort: Refused.  Special equipment needed: Not Applicable    Be sure to schedule a follow-up appointment with your primary care doctor or any specialists as instructed.     Discharge Plan:   Diet Plan: Discussed  Activity Level: Discussed  Smoking Cessation Offered: Patient Refused  Confirmed Follow up Appointment: Appointment Scheduled  Confirmed Symptoms Management: Discussed  Medication Reconciliation Updated: Yes  Influenza Vaccine Indication: Not indicated: Previously immunized this influenza season and > 8 years of age    I understand that a diet low in cholesterol, fat, and sodium is recommended for good health. Unless I have been given specific instructions below for another diet, I accept this instruction as my diet prescription.   Other diet: Regular      Special Instructions: None    · Is patient discharged on Warfarin / Coumadin?   No     Depression / Suicide Risk    As you are discharged from this [facility] facility, it is important to learn how to keep safe from harming yourself.    Recognize the warning signs:  · Abrupt changes in personality, positive or negative- including increase in energy   · Giving away possessions  · Change in eating patterns- significant weight changes-  positive or negative  · Change in sleeping patterns- unable to sleep or sleeping all the time   · Unwillingness or inability to communicate  · Depression  · Unusual sadness, discouragement and loneliness  · Talk of wanting to die  · Neglect of personal appearance   · Rebelliousness- reckless behavior  · Withdrawal from people/activities they love  · Confusion- inability to concentrate     If you or a loved one observes any of these behaviors or has concerns about self-harm, here's what you can do:  · Talk about it- your feelings and reasons for harming yourself  · Remove any means that you might use to hurt yourself  (examples: pills, rope, extension cords, firearm)  · Get professional help from the community (Mental Health, Substance Abuse, psychological counseling)  · Do not be alone:Call your Safe Contact- someone whom you trust who will be there for you.  · Call your local CRISIS HOTLINE 034-0287 or 479-046-5480  · Call your local Children's Mobile Crisis Response Team [place] (098) 888-6469 or www.I-Shake  · Call the toll free National Suicide Prevention Hotlines   · National Suicide Prevention Lifeline 854-076-XXMJ (7104)  · National [employer] Line Network 800-SUICIDE (242-6645)      Discharge Instructions per Katia Hernandez D.O.      Follow-up with primary care provider/discharge clinic in 1 week  Doxycycline x7 days    DIET: Regular    ACTIVITY: As tolerated    DIAGNOSIS: Pneumonia, pleuritic chest pain    Return to ER if recurrent chest pain, cough with worsening shortness of breath.

## 2019-08-10 NOTE — H&P
Hospital Medicine History & Physical Note    Date of Service  8/9/2019    Primary Care Physician  AMOS Arshad    Consultants  None    Code Status  Full    Chief Complaint  Chest pain    History of Presenting Illness  44 y.o. female who presented 8/9/2019 with chest pain.  Patient states her symptoms started 3 days ago with fever, fatigue as well as a nonproductive cough.  She then began developing chest pain.  She states is substernal with radiation to the left, around the side into her back, 10/10 at its worse.  She described it as a grabbing and twisting sensation.  She states is worse with a deep breath and exertion.  She also complains of shortness of breath with exertion.  Patient was noted to be hypoxic at 88% and during the exam.  Patient did have a CT scan that showed.  She also has been complaining of myalgias.  I did discuss the case including labs and imaging with the ER physician.    Review of Systems  Review of Systems   Constitutional: Positive for fever and malaise/fatigue. Negative for chills.   HENT: Negative for congestion.    Respiratory: Positive for cough and shortness of breath. Negative for sputum production and stridor.    Cardiovascular: Positive for chest pain. Negative for palpitations and leg swelling.   Gastrointestinal: Negative for abdominal pain, constipation, diarrhea, nausea and vomiting.   Genitourinary: Negative for dysuria and urgency.   Musculoskeletal: Negative for falls and myalgias.   Neurological: Negative for dizziness, tingling, loss of consciousness, weakness and headaches.   Psychiatric/Behavioral: Negative for depression and suicidal ideas.   All other systems reviewed and are negative.      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    Surgical History   has a past surgical history that includes open reduction; appendectomy; abdominal exploration; cholecystectomy; and tubal coagulation laparoscopic bilateral.     Family History  family history includes Alcohol/Drug in her mother; Cancer in her father; Hypertension in her mother; Psychiatric Illness in her mother.     Social History   reports that she has been smoking. She has a 6.25 pack-year smoking history. She has never used smokeless tobacco. She reports that she has current or past drug history. Drugs: Marijuana and Inhaled. She reports that she does not drink alcohol.    Allergies  Allergies   Allergen Reactions   • Penicillins Anaphylaxis     Reported that her throat closes   • Sulfa Drugs Anaphylaxis     Reported that her throat closes   • Carbamazepine Rash and Swelling     .   • Cefadroxil      Pt is not sure, but knows that she has an allergy to this medication.   • Cephalexin Rash and Swelling     Full body sweeling and rash    • Methadone Swelling   • Mushroom Extract Complex Swelling   • Phenytoin      Swelling   • Rondec Rash and Swelling     .       Medications  Prior to Admission Medications   Prescriptions Last Dose Informant Patient Reported? Taking?   Fluticasone Furoate-Vilanterol (BREO ELLIPTA) 100-25 MCG/INH AEROSOL POWDER, BREATH ACTIVATED  Patient Yes No   Sig: Inhale 1 Puff by mouth every day.   albuterol (PROVENTIL) 2.5mg/3ml Nebu Soln solution for nebulization  Patient Yes No   Sig: Use as needed   albuterol (VENTOLIN HFA) 108 (90 Base) MCG/ACT Aero Soln inhalation aerosol  Patient No No   Sig: Inhale 2 Puffs by mouth every four hours as  needed for Shortness of Breath.   alprazolam (XANAX) 1 MG TABS  Patient Yes No   Sig: Take 1 mg by mouth 3 times a day as needed. Indications: Feeling Anxious, Trouble Sleeping   aspirin EC (ECOTRIN) 81 MG Tablet Delayed Response  Patient Yes No   Sig: Take 81 mg by mouth every day.   atorvastatin (LIPITOR) 40 MG Tab  Patient No No   Sig: Take 1 Tab by mouth every evening.   ferrous sulfate 325 (65 Fe) MG tablet   No No   Sig: Take 1 Tab by mouth every morning with breakfast.   gabapentin (NEURONTIN) 800 MG tablet  Patient Yes No   Sig: Take 800 mg by mouth every 8 hours.   omeprazole (PRILOSEC) 20 MG delayed-release capsule  Patient Yes No   Sig: Take 20 mg by mouth 1 time daily as needed (Heartburn).   predniSONE (DELTASONE) 20 MG Tab  Patient Yes No   Sig: Take 60 mg by mouth every morning with breakfast.   traZODone (DESYREL) 100 MG Tab  Patient Yes No   Sig: Take 100 mg by mouth at bedtime as needed for Sleep.      Facility-Administered Medications: None       Physical Exam  Pulse:  [53-55] 53  Resp:  [12-13] 12  BP: (81-99)/(55-58) 99/58  SpO2:  [92 %-98 %] 98 %    Physical Exam   Constitutional: She is oriented to person, place, and time. She appears well-developed. She is cooperative. No distress.   HENT:   Head: Normocephalic and atraumatic. Not macrocephalic and not microcephalic. Head is without raccoon's eyes and without Reynolds's sign.   Right Ear: External ear normal.   Left Ear: External ear normal.   Mouth/Throat: Mucous membranes are dry. No oropharyngeal exudate.   Eyes: Conjunctivae are normal. Right eye exhibits no discharge. Left eye exhibits no discharge. No scleral icterus.   Neck: Neck supple. No tracheal deviation present.   Cardiovascular: Normal rate, regular rhythm and intact distal pulses. Exam reveals no gallop, no distant heart sounds and no friction rub.   No murmur heard.  Pulmonary/Chest: Effort normal. No accessory muscle usage or stridor. No tachypnea and no bradypnea. No  respiratory distress. She has no decreased breath sounds. She has no wheezes. She has no rhonchi. She has rales. She exhibits no tenderness.   Abdominal: Soft. Bowel sounds are normal. She exhibits no distension. There is no hepatosplenomegaly, splenomegaly or hepatomegaly. There is no tenderness. There is no rebound and no guarding.   Musculoskeletal: Normal range of motion. She exhibits no edema or tenderness.   Lymphadenopathy:     She has no cervical adenopathy.   Neurological: She is alert and oriented to person, place, and time. No cranial nerve deficit. She displays no seizure activity.   Skin: Skin is warm, dry and intact. No rash noted. She is not diaphoretic. No erythema. No pallor.   Psychiatric: She has a normal mood and affect. Her speech is normal and behavior is normal. Judgment and thought content normal. Cognition and memory are normal.   Nursing note and vitals reviewed.      Laboratory:  Recent Labs     08/09/19  1543   WBC 3.5*   RBC 4.41   HEMOGLOBIN 9.3*   HEMATOCRIT 30.0*   MCV 68.0*   MCH 21.1*   MCHC 31.0*   RDW 38.0   PLATELETCT 142*   MPV 9.4     Recent Labs     08/09/19  1543   SODIUM 136   POTASSIUM 3.7   CHLORIDE 104   CO2 24   GLUCOSE 80   BUN 11   CREATININE 0.74   CALCIUM 8.9     Recent Labs     08/09/19  1543   ALTSGPT 5   ASTSGOT 10*   ALKPHOSPHAT 58   TBILIRUBIN 0.5   GLUCOSE 80         No results for input(s): NTPROBNP in the last 72 hours.      Recent Labs     08/09/19  1543   TROPONINT <6       Urinalysis:    No results found     Imaging:  CT-CTA CHEST PULMONARY ARTERY W/ RECONS   Final Result      1.  There is no CT evidence of acute pulmonary embolism.   2.  There are patchy bilateral groundglass opacities most prominent in the right upper lobe. The appearance is most suggestive of an infectious or inflammatory etiology.   3.  There is no significant adenopathy.            DX-CHEST-PORTABLE (1 VIEW)   Final Result      1.  There is no acute cardiopulmonary process.             Assessment/Plan:  I anticipate this patient will require at least two midnights for appropriate medical management, necessitating inpatient admission.    * Pneumonia- (present on admission)  Assessment & Plan  -I did personally review the CTA of the chest, noted bilateral pneumonia  -Due to her allergy profile, start IV Levaquin  -Await culture results  -Not causing sepsis   -this is causing a mild leukopenia however    Chest pain- (present on admission)  Assessment & Plan  -I feel this is due to pneumonia, not cardiac in nature  -I did personally review her EKG, was similar to previous, I also think there was a lead placement issue  -Troponin was negative, chest pain began after her other symptoms  -She did have a negative stress test in November, no need for additional work-up    Microcytic anemia- (present on admission)  Assessment & Plan  -Continue iron supplementation  -She did have iron levels last year, they were normal, no iron deficiency anemia  -No sign of gross bleeding  -Repeat CBC in the morning    Tobacco abuse- (present on admission)  Assessment & Plan  -Tobacco cessation counseling and education provided for more than 10 minutes. Nicotine replacement options provided including patch, and further medical treatments including Wellbutrin and chantix.  As well as over the counter options of lozenges and gum.  -she denied the need for patch or gum    GERD (gastroesophageal reflux disease)- (present on admission)  Assessment & Plan  -Continue home omeprazole      VTE prophylaxis: Lovenox

## 2019-08-10 NOTE — DISCHARGE SUMMARY
Discharge Summary    CHIEF COMPLAINT ON ADMISSION  Chief Complaint   Patient presents with   • Chest Pain       Reason for Admission  EMS     Admission Date  8/9/2019    CODE STATUS  Full Code    HPI & HOSPITAL COURSE  This is a 44 y.o. female here with complaints of chest pain and nonproductive cough.  She is admitted for community-acquired pneumonia and is currently on IV antibiotics.  Recent cardiac testing is negative.  Pain appears consistent with pleuritic chest pain.  Urine drug screen is positive for benzodiazepine, CBD, opioids and oxycodone.    Pro-Calcitonin level is negative.  CT PE study is negative for pulmonary emboli.  Infiltrate is noted.  Blood pressure remains borderline low.    On examination, patient reports some reproducible lateral left-sided chest pain.  Extensive discussion with patient regarding regular NSAID use.  Patient reports use of Prilosec.  She also is aware of tapering doses of NSAIDs as tolerated.  We advised patient to avoid narcotics.  At this point pain appears to be controlled.  She reports minimal cough.  She states that she feels better.  She will be discharged to home on a course of oral antibiotics.       Therefore, she is discharged in good and stable condition to home with close outpatient follow-up.    The patient met 2-midnight criteria for an inpatient stay at the time of discharge.    Discharge Date  August 10    FOLLOW UP ITEMS POST DISCHARGE  Primary care provider/discharge clinic in 1 week  Doxycycline for 7 days    DISCHARGE DIAGNOSES  Principal Problem:    Pneumonia POA: Yes  Active Problems:    Chest pain POA: Yes    Microcytic anemia POA: Yes    GERD (gastroesophageal reflux disease) POA: Yes    Tobacco abuse POA: Yes  Resolved Problems:    * No resolved hospital problems. *      FOLLOW UP  No future appointments.  AMOS Arshad  [address] [place] [address] [id number] [phone]    In 1 week        MEDICATIONS ON DISCHARGE     Medication List      START  taking these medications      Instructions   acetaminophen 325 MG Tabs  Commonly known as:  TYLENOL   Take 2 Tabs by mouth every 6 hours as needed (Mild Pain; (Pain scale 1-3); Temp greater than 100.5 F).  Dose:  650 mg     doxycycline 100 MG capsule  Commonly known as:  MONODOX   Take 1 Cap by mouth 2 times a day for 7 days.  Dose:  100 mg        CONTINUE taking these medications      Instructions   * albuterol 108 (90 Base) MCG/ACT Aers inhalation aerosol   Inhale 2 Puffs by mouth every four hours as needed for Shortness of Breath.  Dose:  2 Puff     * albuterol 2.5mg/3ml Nebu solution for nebulization  Commonly known as:  PROVENTIL   2.5 mg by Nebulization route every four hours as needed for Shortness of Breath. Use as needed  Dose:  2.5 mg     ALPRAZolam 1 MG Tabs  Commonly known as:  XANAX   Take 1 mg by mouth 3 times a day as needed. Indications: Feeling Anxious, Trouble Sleeping  Dose:  1 mg     aspirin EC 81 MG Tbec  Commonly known as:  ECOTRIN   Take 81 mg by mouth every day.  Dose:  81 mg     BREO ELLIPTA 100-25 MCG/INH Aepb  Generic drug:  Fluticasone Furoate-Vilanterol   Inhale 1 Puff by mouth 1 time daily as needed (shortness of breath).  Dose:  1 Puff     ferrous sulfate 325 (65 Fe) MG tablet   Take 325 mg by mouth every morning.  Dose:  325 mg     ibuprofen 200 MG Tabs  Commonly known as:  MOTRIN   Take 800 mg by mouth every 8 hours as needed for Mild Pain or Headache.  Dose:  800 mg     omeprazole 40 MG delayed-release capsule  Commonly known as:  PRILOSEC   Take 40 mg by mouth 2 Times a Day. Indications: Heartburn  Dose:  40 mg         * This list has 2 medication(s) that are the same as other medications prescribed for you. Read the directions carefully, and ask your doctor or other care provider to review them with you.                Allergies  Allergies   Allergen Reactions   • Carbamazepine Rash and Swelling     .   • Cephalexin Rash and Swelling     Full body sweeling and rash    • Mushroom  Extract Complex Swelling   • Penicillins Anaphylaxis     Reported that her throat closes   • Phenytoin Swelling     Swelling   • Rondec Rash and Swelling     .   • Sulfa Drugs Anaphylaxis     Reported that her throat closes   • Cefadroxil Unspecified     Pt is not sure, but knows that she has an allergy to this medication.       DIET  Orders Placed This Encounter   Procedures   • Diet Order Regular     Standing Status:   Standing     Number of Occurrences:   1     Order Specific Question:   Diet:     Answer:   Regular [1]       ACTIVITY  As tolerated.  Weight bearing as tolerated    CONSULTATIONS   none    PROCEDURES  None    LABORATORY  Lab Results   Component Value Date    SODIUM 138 08/10/2019    POTASSIUM 3.6 08/10/2019    CHLORIDE 104 08/10/2019    CO2 27 08/10/2019    GLUCOSE 79 08/10/2019    BUN 10 08/10/2019    CREATININE 0.79 08/10/2019        Lab Results   Component Value Date    WBC 3.6 (L) 08/10/2019    HEMOGLOBIN 9.5 (L) 08/10/2019    HEMATOCRIT 30.5 (L) 08/10/2019    PLATELETCT 146 (L) 08/10/2019        Total time of the discharge process exceeds 45 minutes.

## 2019-08-10 NOTE — PROGRESS NOTES
"Received bedside report from NOC RN. Assumed care at 0700. Patient complaining concerning her NOC care team, stating, \"they were not giving her her pain meds.\". Patient is recognized to have low BP and a low HR. Patient called the [facility] overnight and the [facility] came to speak with her. Dr. Hernandez then D/C'd patient's oxycodone and morphine, and prescribed Patient Oxycodone Q6hr and Toradol. Patient able to make needs known. Bed alarm on, fall precautions implemented.  "

## 2019-08-10 NOTE — CARE PLAN
Problem: Safety  Goal: Will remain free from falls  Outcome: PROGRESSING AS EXPECTED   Fall precautions in place, frequent rounding initiated, call light within reach

## 2019-08-10 NOTE — PROGRESS NOTES
"Patient appears highly intoxicated. Has slurred speech, glassy eyes and talks at great length. Is A&OX4. Called Dr. Olivares and obtained orders for drug and alcohol screen. Patient requested IV morphine to be given for back pain and became upset when she was informed that orders dictated that she receive PO oxycodone before IV morphine. Stated \"Well they gave me Morphine in the ER!\"     Patient wanted to take Xanax at the same time as Oxycodone. She was told that she would have to wait to take Xanax due to hesitation of this nurse to administer 2 sedatives at the same time due to concerns about respiration.This nurse was also hesitant to give meds due to patients behavior and low blood pressure.     Patient proceeded to complain of situation to charge nurse Monica. She finally agreed to take a dose of morphine instead of Xanax one hour after Oxycodone administration. As soon as the 4mg of IV morphine was given, patient asked \"So am I going to get my Xanax in 30 minutes\" and later stated that Monica told her she could have the med in 30 minutes, which Monica later denied.      Approximately 15 minutes after Morphine was given, Phlebotomist came out of patients room and reported that patient was requesting narcotics from her. Patient told Phlebotomist that the nurses had not given her any medications for 4 hours and requested \"Can you get someone who can help me get my meds?\"     When patient was confronted about this behavior by Monica and myself she became very angry and hysterical. She stated that I had purposely withheld her medications and that I had stated that she \"was going to follow my rules and I would give the meds when I wanted.\" Patient proceeded to make many false statements about the situation even though she had been thoroughly explained to the rational behind our decisions regarding narcotic administration. Monica continued to speak with patient after I left.     Patient proceeded to call  and " requested to speak with a supervisor above Monica. She was transferred to [facility] and complained about not getting her narcotics. Patient refused to have myself or Monica as her nurse. Patient care was transferred to NICK Bocanegra. Report was given.

## 2019-08-14 LAB
BACTERIA BLD CULT: NORMAL
BACTERIA BLD CULT: NORMAL
EKG IMPRESSION: NORMAL
SIGNIFICANT IND 70042: NORMAL
SIGNIFICANT IND 70042: NORMAL
SITE SITE: NORMAL
SITE SITE: NORMAL
SOURCE SOURCE: NORMAL
SOURCE SOURCE: NORMAL

## 2020-03-23 ENCOUNTER — HOSPITAL ENCOUNTER (EMERGENCY)
Facility: MEDICAL CENTER | Age: 45
End: 2020-03-23
Attending: EMERGENCY MEDICINE

## 2020-03-23 ENCOUNTER — APPOINTMENT (OUTPATIENT)
Dept: RADIOLOGY | Facility: MEDICAL CENTER | Age: 45
End: 2020-03-23
Attending: EMERGENCY MEDICINE

## 2020-03-23 VITALS
WEIGHT: 140 LBS | OXYGEN SATURATION: 97 % | RESPIRATION RATE: 11 BRPM | TEMPERATURE: 97.2 F | DIASTOLIC BLOOD PRESSURE: 57 MMHG | HEART RATE: 59 BPM | HEIGHT: 68 IN | BODY MASS INDEX: 21.22 KG/M2 | SYSTOLIC BLOOD PRESSURE: 92 MMHG

## 2020-03-23 DIAGNOSIS — R07.89 OTHER CHEST PAIN: ICD-10-CM

## 2020-03-23 DIAGNOSIS — E03.9 HYPOTHYROIDISM, UNSPECIFIED TYPE: ICD-10-CM

## 2020-03-23 LAB
ALBUMIN SERPL BCP-MCNC: 4.3 G/DL (ref 3.2–4.9)
ALBUMIN/GLOB SERPL: 2.2 G/DL
ALP SERPL-CCNC: 80 U/L (ref 30–99)
ALT SERPL-CCNC: 6 U/L (ref 2–50)
ANION GAP SERPL CALC-SCNC: 13 MMOL/L (ref 7–16)
AST SERPL-CCNC: 11 U/L (ref 12–45)
BASOPHILS # BLD AUTO: 1.2 % (ref 0–1.8)
BASOPHILS # BLD: 0.07 K/UL (ref 0–0.12)
BILIRUB SERPL-MCNC: 0.3 MG/DL (ref 0.1–1.5)
BUN SERPL-MCNC: 12 MG/DL (ref 8–22)
CALCIUM SERPL-MCNC: 8.4 MG/DL (ref 8.5–10.5)
CHLORIDE SERPL-SCNC: 105 MMOL/L (ref 96–112)
CO2 SERPL-SCNC: 22 MMOL/L (ref 20–33)
CREAT SERPL-MCNC: 0.66 MG/DL (ref 0.5–1.4)
EKG IMPRESSION: NORMAL
EOSINOPHIL # BLD AUTO: 0.44 K/UL (ref 0–0.51)
EOSINOPHIL NFR BLD: 7.2 % (ref 0–6.9)
ERYTHROCYTE [DISTWIDTH] IN BLOOD BY AUTOMATED COUNT: 37.3 FL (ref 35.9–50)
GLOBULIN SER CALC-MCNC: 2 G/DL (ref 1.9–3.5)
GLUCOSE SERPL-MCNC: 80 MG/DL (ref 65–99)
HCT VFR BLD AUTO: 33.4 % (ref 37–47)
HGB BLD-MCNC: 10.4 G/DL (ref 12–16)
IMM GRANULOCYTES # BLD AUTO: 0.01 K/UL (ref 0–0.11)
IMM GRANULOCYTES NFR BLD AUTO: 0.2 % (ref 0–0.9)
LYMPHOCYTES # BLD AUTO: 2.15 K/UL (ref 1–4.8)
LYMPHOCYTES NFR BLD: 35.4 % (ref 22–41)
MCH RBC QN AUTO: 21.7 PG (ref 27–33)
MCHC RBC AUTO-ENTMCNC: 31.1 G/DL (ref 33.6–35)
MCV RBC AUTO: 69.6 FL (ref 81.4–97.8)
MONOCYTES # BLD AUTO: 0.26 K/UL (ref 0–0.85)
MONOCYTES NFR BLD AUTO: 4.3 % (ref 0–13.4)
NEUTROPHILS # BLD AUTO: 3.15 K/UL (ref 2–7.15)
NEUTROPHILS NFR BLD: 51.7 % (ref 44–72)
NRBC # BLD AUTO: 0 K/UL
NRBC BLD-RTO: 0 /100 WBC
PLATELET # BLD AUTO: 158 K/UL (ref 164–446)
PMV BLD AUTO: 10.1 FL (ref 9–12.9)
POTASSIUM SERPL-SCNC: 3.9 MMOL/L (ref 3.6–5.5)
PROT SERPL-MCNC: 6.3 G/DL (ref 6–8.2)
RBC # BLD AUTO: 4.8 M/UL (ref 4.2–5.4)
SODIUM SERPL-SCNC: 140 MMOL/L (ref 135–145)
TROPONIN T SERPL-MCNC: <6 NG/L (ref 6–19)
TSH SERPL DL<=0.005 MIU/L-ACNC: 47.5 UIU/ML (ref 0.38–5.33)
WBC # BLD AUTO: 6.1 K/UL (ref 4.8–10.8)

## 2020-03-23 PROCEDURE — 93005 ELECTROCARDIOGRAM TRACING: CPT

## 2020-03-23 PROCEDURE — 93005 ELECTROCARDIOGRAM TRACING: CPT | Performed by: EMERGENCY MEDICINE

## 2020-03-23 PROCEDURE — 80053 COMPREHEN METABOLIC PANEL: CPT

## 2020-03-23 PROCEDURE — 84484 ASSAY OF TROPONIN QUANT: CPT

## 2020-03-23 PROCEDURE — 71045 X-RAY EXAM CHEST 1 VIEW: CPT

## 2020-03-23 PROCEDURE — 85025 COMPLETE CBC W/AUTO DIFF WBC: CPT

## 2020-03-23 PROCEDURE — 84443 ASSAY THYROID STIM HORMONE: CPT

## 2020-03-23 PROCEDURE — 99284 EMERGENCY DEPT VISIT MOD MDM: CPT

## 2020-03-23 ASSESSMENT — FIBROSIS 4 INDEX: FIB4 SCORE: 1.32
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
## 2020-03-23 NOTE — ED TRIAGE NOTES
Pt called [facility] nurse line and told to come in. Brought in by EMS C/O chest pain on and off and tightness for a few weeks but was bad at around 3 am. Also c/o SOB, nausea, vomiting, diarrhea, and cough. EMS gave 4mg zofran SL. Hx of graves disease

## 2020-03-23 NOTE — DISCHARGE INSTRUCTIONS
We could not find a dangerous cause of your chest pain.  Return for worsening shortness of breath, leg swelling, or ill appearance.  Follow up with your doctor if not better Friday.  Follow-up with your endocrinologist for thyroid hormone replacement.

## 2021-02-24 ENCOUNTER — APPOINTMENT (OUTPATIENT)
Dept: RADIOLOGY | Facility: MEDICAL CENTER | Age: 46
DRG: 190 | End: 2021-02-24
Payer: MEDICAID

## 2021-02-24 ENCOUNTER — HOSPITAL ENCOUNTER (INPATIENT)
Facility: MEDICAL CENTER | Age: 46
LOS: 1 days | DRG: 190 | End: 2021-02-26
Attending: EMERGENCY MEDICINE | Admitting: HOSPITALIST
Payer: MEDICAID

## 2021-02-24 ENCOUNTER — APPOINTMENT (OUTPATIENT)
Dept: RADIOLOGY | Facility: MEDICAL CENTER | Age: 46
DRG: 190 | End: 2021-02-24
Attending: EMERGENCY MEDICINE
Payer: MEDICAID

## 2021-02-24 DIAGNOSIS — J44.9 CHRONIC OBSTRUCTIVE PULMONARY DISEASE, UNSPECIFIED COPD TYPE (HCC): ICD-10-CM

## 2021-02-24 PROBLEM — F12.10 CANNABIS USE DISORDER, MILD, ABUSE: Status: ACTIVE | Noted: 2021-02-24

## 2021-02-24 LAB
ALBUMIN SERPL BCP-MCNC: 3.9 G/DL (ref 3.2–4.9)
ALBUMIN/GLOB SERPL: 1.3 G/DL
ALP SERPL-CCNC: 107 U/L (ref 30–99)
ALT SERPL-CCNC: 6 U/L (ref 2–50)
ANION GAP SERPL CALC-SCNC: 10 MMOL/L (ref 7–16)
AST SERPL-CCNC: 20 U/L (ref 12–45)
BASOPHILS # BLD AUTO: 0.3 % (ref 0–1.8)
BASOPHILS # BLD: 0.02 K/UL (ref 0–0.12)
BILIRUB SERPL-MCNC: 0.5 MG/DL (ref 0.1–1.5)
BUN SERPL-MCNC: 13 MG/DL (ref 8–22)
CALCIUM SERPL-MCNC: 9.3 MG/DL (ref 8.5–10.5)
CHLORIDE SERPL-SCNC: 104 MMOL/L (ref 96–112)
CO2 SERPL-SCNC: 25 MMOL/L (ref 20–33)
CREAT SERPL-MCNC: 0.56 MG/DL (ref 0.5–1.4)
EKG IMPRESSION: NORMAL
EOSINOPHIL # BLD AUTO: 0.12 K/UL (ref 0–0.51)
EOSINOPHIL NFR BLD: 1.7 % (ref 0–6.9)
ERYTHROCYTE [DISTWIDTH] IN BLOOD BY AUTOMATED COUNT: 34.8 FL (ref 35.9–50)
FLUAV RNA SPEC QL NAA+PROBE: NEGATIVE
FLUBV RNA SPEC QL NAA+PROBE: NEGATIVE
GLOBULIN SER CALC-MCNC: 2.9 G/DL (ref 1.9–3.5)
GLUCOSE SERPL-MCNC: 94 MG/DL (ref 65–99)
HCT VFR BLD AUTO: 31.2 % (ref 37–47)
HGB BLD-MCNC: 10 G/DL (ref 12–16)
IMM GRANULOCYTES # BLD AUTO: 0.01 K/UL (ref 0–0.11)
IMM GRANULOCYTES NFR BLD AUTO: 0.1 % (ref 0–0.9)
LYMPHOCYTES # BLD AUTO: 1.34 K/UL (ref 1–4.8)
LYMPHOCYTES NFR BLD: 18.7 % (ref 22–41)
MCH RBC QN AUTO: 21.2 PG (ref 27–33)
MCHC RBC AUTO-ENTMCNC: 32.1 G/DL (ref 33.6–35)
MCV RBC AUTO: 66.1 FL (ref 81.4–97.8)
MONOCYTES # BLD AUTO: 0.22 K/UL (ref 0–0.85)
MONOCYTES NFR BLD AUTO: 3.1 % (ref 0–13.4)
NEUTROPHILS # BLD AUTO: 5.47 K/UL (ref 2–7.15)
NEUTROPHILS NFR BLD: 76.1 % (ref 44–72)
NRBC # BLD AUTO: 0 K/UL
NRBC BLD-RTO: 0 /100 WBC
NT-PROBNP SERPL IA-MCNC: 381 PG/ML (ref 0–125)
PLATELET # BLD AUTO: 173 K/UL (ref 164–446)
PMV BLD AUTO: 9.9 FL (ref 9–12.9)
POTASSIUM SERPL-SCNC: 4.1 MMOL/L (ref 3.6–5.5)
PROT SERPL-MCNC: 6.8 G/DL (ref 6–8.2)
RBC # BLD AUTO: 4.72 M/UL (ref 4.2–5.4)
RSV RNA SPEC QL NAA+PROBE: NEGATIVE
SARS-COV-2 RNA RESP QL NAA+PROBE: NOTDETECTED
SODIUM SERPL-SCNC: 139 MMOL/L (ref 135–145)
SPECIMEN SOURCE: NORMAL
WBC # BLD AUTO: 7.2 K/UL (ref 4.8–10.8)

## 2021-02-24 PROCEDURE — 71045 X-RAY EXAM CHEST 1 VIEW: CPT

## 2021-02-24 PROCEDURE — 96375 TX/PRO/DX INJ NEW DRUG ADDON: CPT

## 2021-02-24 PROCEDURE — 93005 ELECTROCARDIOGRAM TRACING: CPT

## 2021-02-24 PROCEDURE — 700102 HCHG RX REV CODE 250 W/ 637 OVERRIDE(OP): Performed by: STUDENT IN AN ORGANIZED HEALTH CARE EDUCATION/TRAINING PROGRAM

## 2021-02-24 PROCEDURE — 700102 HCHG RX REV CODE 250 W/ 637 OVERRIDE(OP): Performed by: EMERGENCY MEDICINE

## 2021-02-24 PROCEDURE — 700111 HCHG RX REV CODE 636 W/ 250 OVERRIDE (IP): Performed by: EMERGENCY MEDICINE

## 2021-02-24 PROCEDURE — 86738 MYCOPLASMA ANTIBODY: CPT

## 2021-02-24 PROCEDURE — G0378 HOSPITAL OBSERVATION PER HR: HCPCS

## 2021-02-24 PROCEDURE — 96372 THER/PROPH/DIAG INJ SC/IM: CPT

## 2021-02-24 PROCEDURE — 700111 HCHG RX REV CODE 636 W/ 250 OVERRIDE (IP): Performed by: STUDENT IN AN ORGANIZED HEALTH CARE EDUCATION/TRAINING PROGRAM

## 2021-02-24 PROCEDURE — 99218 PR INITIAL OBSERVATION CARE,LEVL I: CPT | Performed by: STUDENT IN AN ORGANIZED HEALTH CARE EDUCATION/TRAINING PROGRAM

## 2021-02-24 PROCEDURE — A9270 NON-COVERED ITEM OR SERVICE: HCPCS | Performed by: STUDENT IN AN ORGANIZED HEALTH CARE EDUCATION/TRAINING PROGRAM

## 2021-02-24 PROCEDURE — 83880 ASSAY OF NATRIURETIC PEPTIDE: CPT

## 2021-02-24 PROCEDURE — 93005 ELECTROCARDIOGRAM TRACING: CPT | Performed by: EMERGENCY MEDICINE

## 2021-02-24 PROCEDURE — 80053 COMPREHEN METABOLIC PANEL: CPT

## 2021-02-24 PROCEDURE — C9803 HOPD COVID-19 SPEC COLLECT: HCPCS | Performed by: EMERGENCY MEDICINE

## 2021-02-24 PROCEDURE — 94640 AIRWAY INHALATION TREATMENT: CPT

## 2021-02-24 PROCEDURE — A9270 NON-COVERED ITEM OR SERVICE: HCPCS | Performed by: EMERGENCY MEDICINE

## 2021-02-24 PROCEDURE — 85025 COMPLETE CBC W/AUTO DIFF WBC: CPT

## 2021-02-24 PROCEDURE — 96365 THER/PROPH/DIAG IV INF INIT: CPT

## 2021-02-24 PROCEDURE — 87040 BLOOD CULTURE FOR BACTERIA: CPT

## 2021-02-24 PROCEDURE — 99285 EMERGENCY DEPT VISIT HI MDM: CPT

## 2021-02-24 PROCEDURE — 0241U HCHG SARS-COV-2 COVID-19 NFCT DS RESP RNA 4 TRGT MIC: CPT

## 2021-02-24 RX ORDER — LEVOTHYROXINE SODIUM 0.12 MG/1
125 TABLET ORAL
Status: ON HOLD | COMMUNITY
End: 2021-03-01 | Stop reason: SDUPTHER

## 2021-02-24 RX ORDER — ALPRAZOLAM 1 MG/1
1 TABLET ORAL 3 TIMES DAILY PRN
Status: DISCONTINUED | OUTPATIENT
Start: 2021-02-24 | End: 2021-02-26 | Stop reason: HOSPADM

## 2021-02-24 RX ORDER — LEVOFLOXACIN 5 MG/ML
750 INJECTION, SOLUTION INTRAVENOUS ONCE
Status: COMPLETED | OUTPATIENT
Start: 2021-02-24 | End: 2021-02-24

## 2021-02-24 RX ORDER — MORPHINE SULFATE 4 MG/ML
4 INJECTION, SOLUTION INTRAMUSCULAR; INTRAVENOUS ONCE
Status: COMPLETED | OUTPATIENT
Start: 2021-02-24 | End: 2021-02-24

## 2021-02-24 RX ORDER — METHOCARBAMOL 500 MG/1
500 TABLET, FILM COATED ORAL ONCE
Status: DISPENSED | OUTPATIENT
Start: 2021-02-24 | End: 2021-02-25

## 2021-02-24 RX ORDER — ALBUTEROL SULFATE 90 UG/1
2 AEROSOL, METERED RESPIRATORY (INHALATION) EVERY 4 HOURS PRN
Status: DISCONTINUED | OUTPATIENT
Start: 2021-02-24 | End: 2021-02-26 | Stop reason: HOSPADM

## 2021-02-24 RX ORDER — ACETAMINOPHEN 325 MG/1
650 TABLET ORAL EVERY 6 HOURS PRN
Status: DISCONTINUED | OUTPATIENT
Start: 2021-02-24 | End: 2021-02-26 | Stop reason: HOSPADM

## 2021-02-24 RX ORDER — LEVOTHYROXINE SODIUM 0.12 MG/1
125 TABLET ORAL
Status: DISCONTINUED | OUTPATIENT
Start: 2021-02-25 | End: 2021-02-26 | Stop reason: HOSPADM

## 2021-02-24 RX ORDER — OXYCODONE HYDROCHLORIDE AND ACETAMINOPHEN 5; 325 MG/1; MG/1
1 TABLET ORAL EVERY 8 HOURS PRN
Status: DISCONTINUED | OUTPATIENT
Start: 2021-02-24 | End: 2021-02-25

## 2021-02-24 RX ORDER — LABETALOL HYDROCHLORIDE 5 MG/ML
10 INJECTION, SOLUTION INTRAVENOUS EVERY 4 HOURS PRN
Status: DISCONTINUED | OUTPATIENT
Start: 2021-02-24 | End: 2021-02-26 | Stop reason: HOSPADM

## 2021-02-24 RX ORDER — MORPHINE SULFATE 4 MG/ML
4 INJECTION, SOLUTION INTRAMUSCULAR; INTRAVENOUS EVERY 4 HOURS PRN
Status: DISCONTINUED | OUTPATIENT
Start: 2021-02-24 | End: 2021-02-26 | Stop reason: HOSPADM

## 2021-02-24 RX ORDER — AMOXICILLIN 250 MG
2 CAPSULE ORAL 2 TIMES DAILY
Status: DISCONTINUED | OUTPATIENT
Start: 2021-02-24 | End: 2021-02-26 | Stop reason: HOSPADM

## 2021-02-24 RX ORDER — LEVOFLOXACIN 5 MG/ML
750 INJECTION, SOLUTION INTRAVENOUS EVERY 24 HOURS
Status: DISCONTINUED | OUTPATIENT
Start: 2021-02-25 | End: 2021-02-25

## 2021-02-24 RX ORDER — ALBUTEROL SULFATE 90 UG/1
2 AEROSOL, METERED RESPIRATORY (INHALATION) ONCE
Status: COMPLETED | OUTPATIENT
Start: 2021-02-24 | End: 2021-02-24

## 2021-02-24 RX ORDER — HEPARIN SODIUM 5000 [USP'U]/ML
5000 INJECTION, SOLUTION INTRAVENOUS; SUBCUTANEOUS EVERY 8 HOURS
Status: DISCONTINUED | OUTPATIENT
Start: 2021-02-24 | End: 2021-02-26 | Stop reason: HOSPADM

## 2021-02-24 RX ORDER — LOPERAMIDE HYDROCHLORIDE 2 MG/1
2 CAPSULE ORAL 4 TIMES DAILY PRN
Status: DISCONTINUED | OUTPATIENT
Start: 2021-02-24 | End: 2021-02-26 | Stop reason: HOSPADM

## 2021-02-24 RX ORDER — POLYETHYLENE GLYCOL 3350 17 G/17G
1 POWDER, FOR SOLUTION ORAL
Status: DISCONTINUED | OUTPATIENT
Start: 2021-02-24 | End: 2021-02-26 | Stop reason: HOSPADM

## 2021-02-24 RX ORDER — TRAZODONE HYDROCHLORIDE 100 MG/1
100 TABLET ORAL
Status: DISCONTINUED | OUTPATIENT
Start: 2021-02-24 | End: 2021-02-26 | Stop reason: HOSPADM

## 2021-02-24 RX ORDER — DEXAMETHASONE SODIUM PHOSPHATE 10 MG/ML
6 INJECTION, SOLUTION INTRAMUSCULAR; INTRAVENOUS ONCE
Status: COMPLETED | OUTPATIENT
Start: 2021-02-24 | End: 2021-02-24

## 2021-02-24 RX ORDER — FERROUS SULFATE 325(65) MG
325 TABLET ORAL EVERY MORNING
Status: DISCONTINUED | OUTPATIENT
Start: 2021-02-24 | End: 2021-02-24

## 2021-02-24 RX ORDER — ALPRAZOLAM 0.25 MG/1
1 TABLET ORAL 3 TIMES DAILY PRN
Status: DISCONTINUED | OUTPATIENT
Start: 2021-02-24 | End: 2021-02-24

## 2021-02-24 RX ORDER — BISACODYL 10 MG
10 SUPPOSITORY, RECTAL RECTAL
Status: DISCONTINUED | OUTPATIENT
Start: 2021-02-24 | End: 2021-02-26 | Stop reason: HOSPADM

## 2021-02-24 RX ORDER — AZITHROMYCIN 500 MG/5ML
500 INJECTION, POWDER, LYOPHILIZED, FOR SOLUTION INTRAVENOUS EVERY 24 HOURS
Status: DISCONTINUED | OUTPATIENT
Start: 2021-02-24 | End: 2021-02-24

## 2021-02-24 RX ADMIN — OXYCODONE HYDROCHLORIDE AND ACETAMINOPHEN 1 TABLET: 5; 325 TABLET ORAL at 20:29

## 2021-02-24 RX ADMIN — LEVOFLOXACIN 750 MG: 750 INJECTION, SOLUTION INTRAVENOUS at 14:35

## 2021-02-24 RX ADMIN — HEPARIN SODIUM 5000 UNITS: 5000 INJECTION, SOLUTION INTRAVENOUS; SUBCUTANEOUS at 22:05

## 2021-02-24 RX ADMIN — DEXAMETHASONE SODIUM PHOSPHATE 6 MG: 10 INJECTION INTRAMUSCULAR; INTRAVENOUS at 14:35

## 2021-02-24 RX ADMIN — MORPHINE SULFATE 4 MG: 4 INJECTION INTRAVENOUS at 22:30

## 2021-02-24 RX ADMIN — ALBUTEROL SULFATE 2 PUFF: 90 AEROSOL, METERED RESPIRATORY (INHALATION) at 14:35

## 2021-02-24 RX ADMIN — ALBUTEROL SULFATE 2 PUFF: 90 AEROSOL, METERED RESPIRATORY (INHALATION) at 22:38

## 2021-02-24 RX ADMIN — MORPHINE SULFATE 4 MG: 4 INJECTION INTRAVENOUS at 14:35

## 2021-02-24 RX ADMIN — ALPRAZOLAM 1 MG: 1 TABLET ORAL at 19:26

## 2021-02-24 ASSESSMENT — LIFESTYLE VARIABLES
TOTAL SCORE: 0
HAVE YOU EVER FELT YOU SHOULD CUT DOWN ON YOUR DRINKING: NO
TOTAL SCORE: 0
CONSUMPTION TOTAL: NEGATIVE
ON A TYPICAL DAY WHEN YOU DRINK ALCOHOL HOW MANY DRINKS DO YOU HAVE: 0
HOW MANY TIMES IN THE PAST YEAR HAVE YOU HAD 5 OR MORE DRINKS IN A DAY: 0
EVER FELT BAD OR GUILTY ABOUT YOUR DRINKING: NO
DO YOU DRINK ALCOHOL: NO
HAVE PEOPLE ANNOYED YOU BY CRITICIZING YOUR DRINKING: NO
ALCOHOL_USE: NO
AVERAGE NUMBER OF DAYS PER WEEK YOU HAVE A DRINK CONTAINING ALCOHOL: 0
EVER HAD A DRINK FIRST THING IN THE MORNING TO STEADY YOUR NERVES TO GET RID OF A HANGOVER: NO
TOTAL SCORE: 0

## 2021-02-24 ASSESSMENT — ENCOUNTER SYMPTOMS
FEVER: 1
DEPRESSION: 0
WHEEZING: 1
SHORTNESS OF BREATH: 1
HEADACHES: 0
FALLS: 0
HEARTBURN: 0
PALPITATIONS: 0
COUGH: 1
SPUTUM PRODUCTION: 1
SPEECH CHANGE: 0
CHILLS: 1
WHEEZING: 0
ABDOMINAL PAIN: 0
BLURRED VISION: 0
MYALGIAS: 1
VOMITING: 0
FLANK PAIN: 0
FOCAL WEAKNESS: 0
DIZZINESS: 0
DOUBLE VISION: 0
BRUISES/BLEEDS EASILY: 0

## 2021-02-24 ASSESSMENT — FIBROSIS 4 INDEX: FIB4 SCORE: 1.25

## 2021-02-24 NOTE — ASSESSMENT & PLAN NOTE
With mild acute exacerbation.   - continue inhalers  - duoneb q6 hrs  - RT protocol  - smoking cessation education and counseling

## 2021-02-24 NOTE — ASSESSMENT & PLAN NOTE
Secondary to COPD exacerbation and viral vs atypical pneumonia. Not on oxygen at baseline. COVID, RSV, and influenza negative.   - RT protocol  - flutter valve, mucinex to help mobilize secretions  - levaquin as noted below

## 2021-02-24 NOTE — H&P
Hospital Medicine History & Physical Note    Date of Service  2/25/2021    Primary Care Physician  Pcp Not In Computer    Consultants  None    Code Status  Full Code    Chief Complaint  Chief Complaint   Patient presents with   • Congestion   • Shortness of Breath       History of Presenting Illness  45 y.o. female with history of Graves' disease, iron deficiency anemia, COPD/bronchitis and anxiety who presented 2/24/2021 with persistent subjective fever chills associated myalgia since Friday, 2/19/2021.  Patient reported having fever as high as 101F at home.  She denies recent Covid exposure noted her spouse has been hospitalized recently.  Patient also reported productive cough and shortness of breath.  Upon ER arrival-she was saturating in 70 to 80% but improved with supportive nasal cannula oxygen on 2 L.  She tested negative for Covid and influenza, CXR concerning for bilateral infiltrates.  Patient was given antibiotic, Decadron for suspected Covid initially by ER provider.  Admission was requested by ER.  Patient was admitted to medicine service for further evaluation and treatment.    Review of Systems  Review of Systems   Constitutional: Positive for chills, fever and malaise/fatigue.   HENT: Negative for hearing loss and tinnitus.    Eyes: Negative for blurred vision and double vision.   Respiratory: Positive for cough, sputum production, shortness of breath and wheezing.    Cardiovascular: Negative for chest pain and palpitations.   Gastrointestinal: Negative for abdominal pain, heartburn and vomiting.   Genitourinary: Negative for dysuria and flank pain.   Musculoskeletal: Positive for myalgias. Negative for falls.   Skin: Negative for itching and rash.   Neurological: Negative for dizziness, speech change, focal weakness and headaches.   Endo/Heme/Allergies: Negative for environmental allergies. Does not bruise/bleed easily.   Psychiatric/Behavioral: Negative for depression and suicidal ideas.   All  other systems reviewed and are negative.      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    Surgical History   has a past surgical history that includes open reduction; appendectomy; abdominal exploration; cholecystectomy; and tubal coagulation laparoscopic bilateral.     Family History  family history includes Alcohol/Drug in her mother; Cancer in her father; Hypertension in her mother; Psychiatric Illness in her mother.     Social History   reports that she has quit smoking. Her smoking use included cigarettes. She has a 6.25 pack-year smoking history. She has never used smokeless tobacco. She reports current drug use. Drugs: Marijuana and Inhaled. She reports that she does not drink alcohol.    Allergies  Allergies   Allergen Reactions   • Carbamazepine Rash and Swelling     .   • Cephalexin Rash and Swelling     Full body sweeling and rash    • Mushroom Extract Complex Swelling   • Penicillins Anaphylaxis     Reported that her throat closes   • Phenytoin Swelling     Swelling   • Rondec Rash and Swelling     .   • Sulfa Drugs Anaphylaxis     Reported that her throat closes   • Cefadroxil Unspecified     Pt is not sure, but knows that she has an allergy to this medication.       Medications  Prior to Admission Medications   Prescriptions Last Dose Informant Patient Reported? Taking?   Fluticasone Furoate-Vilanterol (BREO ELLIPTA) 100-25 MCG/INH AEROSOL  POWDER, BREATH ACTIVATED  Patient Yes No   Sig: Inhale 1 Puff by mouth 1 time daily as needed (shortness of breath).   acetaminophen (TYLENOL) 325 MG Tab   No No   Sig: Take 2 Tabs by mouth every 6 hours as needed (Mild Pain; (Pain scale 1-3); Temp greater than 100.5 F).   albuterol (PROVENTIL) 2.5mg/3ml Nebu Soln solution for nebulization  Patient Yes No   Si.5 mg by Nebulization route every four hours as needed for Shortness of Breath. Use as needed   albuterol (VENTOLIN HFA) 108 (90 Base) MCG/ACT Aero Soln inhalation aerosol  Patient No No   Sig: Inhale 2 Puffs by mouth every four hours as needed for Shortness of Breath.   alprazolam (XANAX) 1 MG TABS  Patient Yes No   Sig: Take 1 mg by mouth 3 times a day as needed. Indications: Feeling Anxious, Trouble Sleeping   aspirin EC (ECOTRIN) 81 MG Tablet Delayed Response  Patient Yes No   Sig: Take 81 mg by mouth every day.   ferrous sulfate 325 (65 Fe) MG tablet  Patient Yes No   Sig: Take 325 mg by mouth every morning.   ibuprofen (MOTRIN) 200 MG Tab  Patient Yes No   Sig: Take 800 mg by mouth every 8 hours as needed for Mild Pain or Headache.   levothyroxine (SYNTHROID) 150 MCG Tab   Yes Yes   Sig: Take 150 mcg by mouth Every morning on an empty stomach.      Facility-Administered Medications: None       Physical Exam  Temp:  [36.4 °C (97.6 °F)-37.1 °C (98.7 °F)] 36.4 °C (97.6 °F)  Pulse:  [] 80  Resp:  [15-20] 18  BP: (105-128)/(53-79) 105/53  SpO2:  [87 %-97 %] 93 %    Physical Exam  Vitals and nursing note reviewed.   Constitutional:       Appearance: Normal appearance.   HENT:      Head: Normocephalic and atraumatic.      Nose: Nose normal.      Mouth/Throat:      Mouth: Mucous membranes are dry.   Eyes:      General: No scleral icterus.     Extraocular Movements: Extraocular movements intact.   Cardiovascular:      Rate and Rhythm: Normal rate and regular rhythm.      Pulses: Normal pulses.   Pulmonary:      Effort: Pulmonary effort is normal.       Comments: Prolonged expiratory phase with mild and end-expiratory wheezing  Minimal Rales  Decreased bibasilar breath sounds  Musculoskeletal:      Cervical back: Normal range of motion and neck supple. No tenderness.   Neurological:      Mental Status: She is alert.         Laboratory:  Recent Labs     02/24/21  1330 02/25/21  0309   WBC 7.2 6.6   RBC 4.72 4.65   HEMOGLOBIN 10.0* 9.7*   HEMATOCRIT 31.2* 30.8*   MCV 66.1* 66.2*   MCH 21.2* 20.9*   MCHC 32.1* 31.5*   RDW 34.8* 34.2*   PLATELETCT 173 208   MPV 9.9 10.3     Recent Labs     02/24/21  1330 02/25/21  0309   SODIUM 139 140   POTASSIUM 4.1 4.1   CHLORIDE 104 102   CO2 25 26   GLUCOSE 94 132*   BUN 13 10   CREATININE 0.56 0.51   CALCIUM 9.3 9.5     Recent Labs     02/24/21  1330 02/25/21  0309   ALTSGPT 6  --    ASTSGOT 20  --    ALKPHOSPHAT 107*  --    TBILIRUBIN 0.5  --    GLUCOSE 94 132*         Recent Labs     02/24/21  1330   NTPROBNP 381*         No results for input(s): TROPONINT in the last 72 hours.    Imaging:  DX-CHEST-PORTABLE (1 VIEW)   Final Result      Bilateral perihilar infiltrates with diffuse interstitial prominence. These findings are consistent with pneumonia. Consideration should be given for Covid pneumonia.      IR-MIDLINE CATHETER INSERTION WO GUIDANCE > AGE 3    (Results Pending)         Assessment/Plan:  I anticipate this patient is appropriate for observation status at this time.    * Pneumonia- (present on admission)  Assessment & Plan  Probable community-acquired pneumonia  Endorse productive sputum, subjective fever  Negative Covid and influenza  CXR bilateral infiltrates  History of PCN allergy with anaphylaxis    Abx: Levaquin  F/u sputum Cx, PNA serology, BCx - de-escalate abx as appropriate    COPD (chronic obstructive pulmonary disease) ([facility])  Assessment & Plan  No any acute exacerbation, will avoid steroid treatment at this time  Continue home inhaler  PRN Duonebs  Smoking cessation advised    Anemia  Assessment &  Plan  Chronic, no gross bleeding, monitor  Continue iron supplement    Cannabis use disorder, mild, abuse  Assessment & Plan  Reported smoking recreationally  Cessation discussed    Tobacco abuse- (present on admission)  Assessment & Plan  Cessation discussed and education provided  Reported she has cut down from more than 1 pack/day to 2 cigarettes daily, haven't smoked for the past 2 weeks    Anxiety- (present on admission)  Assessment & Plan  Continue home PRN Xanax    Acute respiratory failure with hypoxia (HCC)  Assessment & Plan  Likely secondary to CAP with underlying COPD/bronchitis  abx noted pneumonia      Hypothyroidism- (present on admission)  Assessment & Plan  History of Graves' disease, not in acute crisis or thyroid storm  Continue home Synthroid    VTE ppx: heparin SQ  Diet: regular  Code: full  Dispo: admit

## 2021-02-24 NOTE — ASSESSMENT & PLAN NOTE
Chronic. Hemoglobin stable. No acute or active bleeding  - transfuse if hemoglobin < 7  - continue iron supplement

## 2021-02-24 NOTE — ED PROVIDER NOTES
ED Provider Note    CHIEF COMPLAINT  Chief Complaint   Patient presents with   • Congestion   • Shortness of Breath       HPI  Madelyn Robb is a 45 y.o. female who presents with complaint of shortness of breath cough and fever.  Patient reports that she has had symptoms for the last few days.  She also has a very mild sore throat.  Patient reports that her cough began as productive but is no longer productive.  Sputum originally was white.  Patient reports pain on deep breaths and cough.  Patient reports some associated body aches.  Patient reports fever of 101 but has been taking Tylenol Motrin every 6 hours to help subdue her fever.  Patient also reports diarrhea which is watery, she denies any associated blood or melena.  Patient is also vomited, nonbloody nonbilious emesis.  She denies any associated abdominal pain.  Patient has a history of Graves' disease and is on levothyroxine now.    REVIEW OF SYSTEMS  Review of Systems   Respiratory: Positive for cough, sputum production and shortness of breath. Negative for wheezing.        See HPI for further details. All other systems are negative.     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    SOCIAL HISTORY  Social History     Tobacco Use   • Smoking status: Former Smoker     Packs/day: 0.25     Years: 25.00     Pack years: 6.25     Types: Cigarettes   • Smokeless tobacco: Never Used   Substance and  Sexual Activity   • Alcohol use: Never     Comment: Denies any alcohol   • Drug use: Yes     Types: Marijuana, Inhaled     Comment: Yesicajuraphael   • Sexual activity: Yes     Comment: engaged       SURGICAL HISTORY   has a past surgical history that includes open reduction; appendectomy; abdominal exploration; cholecystectomy; and tubal coagulation laparoscopic bilateral.    CURRENT MEDICATIONS  Home Medications     Reviewed by Tamara Miller R.N. (Registered Nurse) on 02/24/21 at 1211  Med List Status: Partial   Medication Last Dose Status   acetaminophen (TYLENOL) 325 MG Tab  Active   albuterol (PROVENTIL) 2.5mg/3ml Nebu Soln solution for nebulization  Active   albuterol (VENTOLIN HFA) 108 (90 Base) MCG/ACT Aero Soln inhalation aerosol  Active   alprazolam (XANAX) 1 MG TABS  Active   aspirin EC (ECOTRIN) 81 MG Tablet Delayed Response  Active   ferrous sulfate 325 (65 Fe) MG tablet  Active   Fluticasone Furoate-Vilanterol (BREO ELLIPTA) 100-25 MCG/INH AEROSOL POWDER, BREATH ACTIVATED  Active   ibuprofen (MOTRIN) 200 MG Tab  Active   levothyroxine (SYNTHROID) 150 MCG Tab  Active                ALLERGIES  Allergies   Allergen Reactions   • Carbamazepine Rash and Swelling     .   • Cephalexin Rash and Swelling     Full body sweeling and rash    • Mushroom Extract Complex Swelling   • Penicillins Anaphylaxis     Reported that her throat closes   • Phenytoin Swelling     Swelling   • Rondec Rash and Swelling     .   • Sulfa Drugs Anaphylaxis     Reported that her throat closes   • Cefadroxil Unspecified     Pt is not sure, but knows that she has an allergy to this medication.       PHYSICAL EXAM  Vitals:    02/24/21 1158   BP: 107/55   Pulse: 100   Resp: 16   Temp: 37.1 °C (98.7 °F)   SpO2: 94%       Physical Exam   Constitutional: She is oriented to person, place, and time. She appears well-developed and well-nourished.   HENT:   Head: Normocephalic and atraumatic.   Eyes: Conjunctivae are normal.   Cardiovascular:  Regular rhythm.   Mildly tachycardic   Pulmonary/Chest: Breath sounds normal.   Minimal wheezing and rhonchi which are most pronounced in patient's bases   Abdominal: Soft. Bowel sounds are normal. She exhibits no distension. There is no abdominal tenderness. There is no rebound.   Musculoskeletal:      Cervical back: Normal range of motion and neck supple.   Neurological: She is alert and oriented to person, place, and time.   Skin: Skin is warm and dry. No rash noted.   Psychiatric: She has a normal mood and affect. Her behavior is normal.     Pulse oximetry reveals good waveform with an oxygenation of 87% on room air, on 3 L patient is at 95%    DIAGNOSTIC STUDIES / PROCEDURES    EKG  See below    LABS  Results for orders placed or performed during the hospital encounter of 02/24/21   CBC with Differential   Result Value Ref Range    WBC 7.2 4.8 - 10.8 K/uL    RBC 4.72 4.20 - 5.40 M/uL    Hemoglobin 10.0 (L) 12.0 - 16.0 g/dL    Hematocrit 31.2 (L) 37.0 - 47.0 %    MCV 66.1 (L) 81.4 - 97.8 fL    MCH 21.2 (L) 27.0 - 33.0 pg    MCHC 32.1 (L) 33.6 - 35.0 g/dL    RDW 34.8 (L) 35.9 - 50.0 fL    Platelet Count 173 164 - 446 K/uL    MPV 9.9 9.0 - 12.9 fL    Neutrophils-Polys 76.10 (H) 44.00 - 72.00 %    Lymphocytes 18.70 (L) 22.00 - 41.00 %    Monocytes 3.10 0.00 - 13.40 %    Eosinophils 1.70 0.00 - 6.90 %    Basophils 0.30 0.00 - 1.80 %    Immature Granulocytes 0.10 0.00 - 0.90 %    Nucleated RBC 0.00 /100 WBC    Neutrophils (Absolute) 5.47 2.00 - 7.15 K/uL    Lymphs (Absolute) 1.34 1.00 - 4.80 K/uL    Monos (Absolute) 0.22 0.00 - 0.85 K/uL    Eos (Absolute) 0.12 0.00 - 0.51 K/uL    Baso (Absolute) 0.02 0.00 - 0.12 K/uL    Immature Granulocytes (abs) 0.01 0.00 - 0.11 K/uL    NRBC (Absolute) 0.00 K/uL   Comp Metabolic Panel   Result Value Ref Range    Sodium 139 135 - 145 mmol/L    Potassium 4.1 3.6 - 5.5 mmol/L    Chloride 104 96 - 112 mmol/L    Co2 25 20 - 33 mmol/L    Anion Gap 10.0 7.0 - 16.0    Glucose 94 65 - 99  mg/dL    Bun 13 8 - 22 mg/dL    Creatinine 0.56 0.50 - 1.40 mg/dL    Calcium 9.3 8.5 - 10.5 mg/dL    AST(SGOT) 20 12 - 45 U/L    ALT(SGPT) 6 2 - 50 U/L    Alkaline Phosphatase 107 (H) 30 - 99 U/L    Total Bilirubin 0.5 0.1 - 1.5 mg/dL    Albumin 3.9 3.2 - 4.9 g/dL    Total Protein 6.8 6.0 - 8.2 g/dL    Globulin 2.9 1.9 - 3.5 g/dL    A-G Ratio 1.3 g/dL   COV-2, FLU A/B, AND RSV BY PCR (2-4 HOURS CEPHEID): Collect NP swab in VTM    Specimen: Respirate   Result Value Ref Range    Influenza virus A RNA Negative Negative    Influenza virus B, PCR Negative Negative    RSV, PCR Negative Negative    SARS-CoV-2 by PCR NotDetected     SARS-CoV-2 Source NP Swab    proBrain Natriuretic Peptide, NT   Result Value Ref Range    NT-proBNP 381 (H) 0 - 125 pg/mL   ESTIMATED GFR   Result Value Ref Range    GFR If African American >60 >60 mL/min/1.73 m 2    GFR If Non African American >60 >60 mL/min/1.73 m 2   EKG (NOW)   Result Value Ref Range    Report       [facility] Emergency Dept.    Test Date:  2021  Pt Name:    DENA CASILLAS              Department: ER  MRN:        [id number]                      Room:  Gender:     Female                       Technician: [id number]  :        1975                   Requested By:ER TRIAGE PROTOCOL  Order #:    [id number]                    Reading MD: Imtiaz Up MD    Measurements  Intervals                                Axis  Rate:       78                           P:          37  NE:         140                          QRS:        36  QRSD:       102                          T:          21  QT:         400  QTc:        456    Interpretive Statements  EKG is normal sinus rhythm, normal axis, normal intervals, no ST changes  consistent with acute regional ischemia  Electronically Signed On 2021 13:24:38 PST by Imtiaz Up MD           RADIOLOGY  DX-CHEST-PORTABLE (1 VIEW)   Final Result      Bilateral perihilar infiltrates with diffuse interstitial  prominence. These findings are consistent with pneumonia. Consideration should be given for Covid pneumonia.          COURSE & MEDICAL DECISION MAKING  Pertinent Labs & Imaging studies reviewed. (See chart for details)    Patient here with symptoms most consistent with COVID-19 pneumonia.  Questionable bacterial pneumonia.  X-ray had resulted prior to me seeing patient, shows bilateral infiltrates most consistent with pneumonia.  This in the setting of patient's fever and cough I believe this is the most likely diagnosis.  Primary cardiac etiology be considered less likely.  Patient basic labs are unremarkable, Covid test is pending.  Given the x-ray findings and questionable bacterial pneumonia I did decide to cover patient with ceftriaxone.  Blood cultures have been sent.  Patient be admitted for ongoing care.    After patient was admitted outside of my care the Covid test did result negative  I will interface with the hospitalist covering patient on the floor to see if they like to check a CT for further evaluation.      FINAL IMPRESSION  1.  Pneumonia, hypoxia         Electronically signed by: Jeovanny Kitchen M.D., 2/24/2021 1:26 PM

## 2021-02-24 NOTE — ASSESSMENT & PLAN NOTE
Continue to educate and  on the importance of cessation. Reports that she has cut down but does occassional smoke marijuana.   - nicotine replacement available if needed

## 2021-02-24 NOTE — ASSESSMENT & PLAN NOTE
Admitted for presumed CAP. She is not septic. No leukocytosis, procal is normal. COVID, RSV and influenza negative. Atypical pneumonia vs viral?  - follow up studies for atypical pneumonia; sputum and blood cultures  - RT protocol; wean O2 as able  - continue levaquin for atypical coverage given clinical symptoms and CXR with interstitial infiltrates

## 2021-02-24 NOTE — ED TRIAGE NOTES
"Chief Complaint   Patient presents with   • Congestion   • Shortness of Breath     Pt ambulatory to triage for above complaint. Pt reports symptoms for about a week. Pt reports n/v/d as well as dizziness. EKG done. Pt reports only significant history of graves disease and is concerned with the ongoing symptoms.  [facility]     /55   Pulse 100   Temp 37.1 °C (98.7 °F) (Temporal)   Resp 16   Ht 1.727 m (5' 8\")   Wt 73 kg (160 lb 15 oz)   LMP 01/17/2015   SpO2 94%   BMI 24.47 kg/m²     "

## 2021-02-25 ENCOUNTER — APPOINTMENT (OUTPATIENT)
Dept: RADIOLOGY | Facility: MEDICAL CENTER | Age: 46
DRG: 190 | End: 2021-02-25
Attending: HOSPITALIST
Payer: MEDICAID

## 2021-02-25 LAB
ALBUMIN SERPL BCP-MCNC: 3.9 G/DL (ref 3.2–4.9)
AMPHET UR QL SCN: NEGATIVE
BARBITURATES UR QL SCN: NEGATIVE
BASOPHILS # BLD AUTO: 0.2 % (ref 0–1.8)
BASOPHILS # BLD: 0.01 K/UL (ref 0–0.12)
BENZODIAZ UR QL SCN: POSITIVE
BUN SERPL-MCNC: 10 MG/DL (ref 8–22)
BZE UR QL SCN: NEGATIVE
CALCIUM SERPL-MCNC: 9.5 MG/DL (ref 8.5–10.5)
CANNABINOIDS UR QL SCN: POSITIVE
CHLORIDE SERPL-SCNC: 102 MMOL/L (ref 96–112)
CO2 SERPL-SCNC: 26 MMOL/L (ref 20–33)
CREAT SERPL-MCNC: 0.51 MG/DL (ref 0.5–1.4)
EOSINOPHIL # BLD AUTO: 0 K/UL (ref 0–0.51)
EOSINOPHIL NFR BLD: 0 % (ref 0–6.9)
ERYTHROCYTE [DISTWIDTH] IN BLOOD BY AUTOMATED COUNT: 34.2 FL (ref 35.9–50)
GLUCOSE SERPL-MCNC: 132 MG/DL (ref 65–99)
HCT VFR BLD AUTO: 30.8 % (ref 37–47)
HGB BLD-MCNC: 9.7 G/DL (ref 12–16)
IMM GRANULOCYTES # BLD AUTO: 0.03 K/UL (ref 0–0.11)
IMM GRANULOCYTES NFR BLD AUTO: 0.5 % (ref 0–0.9)
LYMPHOCYTES # BLD AUTO: 0.88 K/UL (ref 1–4.8)
LYMPHOCYTES NFR BLD: 13.3 % (ref 22–41)
MAGNESIUM SERPL-MCNC: 2.1 MG/DL (ref 1.5–2.5)
MCH RBC QN AUTO: 20.9 PG (ref 27–33)
MCHC RBC AUTO-ENTMCNC: 31.5 G/DL (ref 33.6–35)
MCV RBC AUTO: 66.2 FL (ref 81.4–97.8)
METHADONE UR QL SCN: NEGATIVE
MONOCYTES # BLD AUTO: 0.15 K/UL (ref 0–0.85)
MONOCYTES NFR BLD AUTO: 2.3 % (ref 0–13.4)
NEUTROPHILS # BLD AUTO: 5.55 K/UL (ref 2–7.15)
NEUTROPHILS NFR BLD: 83.7 % (ref 44–72)
NRBC # BLD AUTO: 0 K/UL
NRBC BLD-RTO: 0 /100 WBC
OPIATES UR QL SCN: POSITIVE
OXYCODONE UR QL SCN: POSITIVE
PCP UR QL SCN: NEGATIVE
PHOSPHATE SERPL-MCNC: 3.6 MG/DL (ref 2.5–4.5)
PLATELET # BLD AUTO: 208 K/UL (ref 164–446)
PMV BLD AUTO: 10.3 FL (ref 9–12.9)
POTASSIUM SERPL-SCNC: 4.1 MMOL/L (ref 3.6–5.5)
PROCALCITONIN SERPL-MCNC: 0.12 NG/ML
PROPOXYPH UR QL SCN: NEGATIVE
RBC # BLD AUTO: 4.65 M/UL (ref 4.2–5.4)
SODIUM SERPL-SCNC: 140 MMOL/L (ref 135–145)
WBC # BLD AUTO: 6.6 K/UL (ref 4.8–10.8)

## 2021-02-25 PROCEDURE — 83735 ASSAY OF MAGNESIUM: CPT

## 2021-02-25 PROCEDURE — 700111 HCHG RX REV CODE 636 W/ 250 OVERRIDE (IP): Performed by: HOSPITALIST

## 2021-02-25 PROCEDURE — 96372 THER/PROPH/DIAG INJ SC/IM: CPT

## 2021-02-25 PROCEDURE — 700102 HCHG RX REV CODE 250 W/ 637 OVERRIDE(OP): Performed by: STUDENT IN AN ORGANIZED HEALTH CARE EDUCATION/TRAINING PROGRAM

## 2021-02-25 PROCEDURE — 99232 SBSQ HOSP IP/OBS MODERATE 35: CPT | Performed by: HOSPITALIST

## 2021-02-25 PROCEDURE — 94640 AIRWAY INHALATION TREATMENT: CPT

## 2021-02-25 PROCEDURE — 700101 HCHG RX REV CODE 250: Performed by: HOSPITALIST

## 2021-02-25 PROCEDURE — 36415 COLL VENOUS BLD VENIPUNCTURE: CPT

## 2021-02-25 PROCEDURE — 700102 HCHG RX REV CODE 250 W/ 637 OVERRIDE(OP): Performed by: HOSPITALIST

## 2021-02-25 PROCEDURE — A9270 NON-COVERED ITEM OR SERVICE: HCPCS | Performed by: STUDENT IN AN ORGANIZED HEALTH CARE EDUCATION/TRAINING PROGRAM

## 2021-02-25 PROCEDURE — 84145 PROCALCITONIN (PCT): CPT

## 2021-02-25 PROCEDURE — 700111 HCHG RX REV CODE 636 W/ 250 OVERRIDE (IP): Performed by: STUDENT IN AN ORGANIZED HEALTH CARE EDUCATION/TRAINING PROGRAM

## 2021-02-25 PROCEDURE — 770001 HCHG ROOM/CARE - MED/SURG/GYN PRIV*

## 2021-02-25 PROCEDURE — 85025 COMPLETE CBC W/AUTO DIFF WBC: CPT

## 2021-02-25 PROCEDURE — 80307 DRUG TEST PRSMV CHEM ANLYZR: CPT

## 2021-02-25 PROCEDURE — 80069 RENAL FUNCTION PANEL: CPT

## 2021-02-25 PROCEDURE — A9270 NON-COVERED ITEM OR SERVICE: HCPCS | Performed by: HOSPITALIST

## 2021-02-25 PROCEDURE — 96376 TX/PRO/DX INJ SAME DRUG ADON: CPT

## 2021-02-25 PROCEDURE — 96367 TX/PROPH/DG ADDL SEQ IV INF: CPT

## 2021-02-25 PROCEDURE — 700101 HCHG RX REV CODE 250: Performed by: STUDENT IN AN ORGANIZED HEALTH CARE EDUCATION/TRAINING PROGRAM

## 2021-02-25 RX ORDER — PROCHLORPERAZINE EDISYLATE 5 MG/ML
10 INJECTION INTRAMUSCULAR; INTRAVENOUS EVERY 6 HOURS PRN
Status: DISCONTINUED | OUTPATIENT
Start: 2021-02-25 | End: 2021-02-26 | Stop reason: HOSPADM

## 2021-02-25 RX ORDER — GUAIFENESIN 600 MG/1
600 TABLET, EXTENDED RELEASE ORAL EVERY 12 HOURS
Status: DISCONTINUED | OUTPATIENT
Start: 2021-02-25 | End: 2021-02-26 | Stop reason: HOSPADM

## 2021-02-25 RX ORDER — LEVOFLOXACIN 750 MG/1
750 TABLET, FILM COATED ORAL DAILY
Status: DISCONTINUED | OUTPATIENT
Start: 2021-02-26 | End: 2021-02-25

## 2021-02-25 RX ORDER — HYDROCODONE BITARTRATE AND ACETAMINOPHEN 5; 325 MG/1; MG/1
1-2 TABLET ORAL EVERY 6 HOURS PRN
Status: DISCONTINUED | OUTPATIENT
Start: 2021-02-25 | End: 2021-02-26

## 2021-02-25 RX ORDER — ECHINACEA PURPUREA EXTRACT 125 MG
2 TABLET ORAL
Status: DISCONTINUED | OUTPATIENT
Start: 2021-02-25 | End: 2021-02-26 | Stop reason: HOSPADM

## 2021-02-25 RX ORDER — IPRATROPIUM BROMIDE AND ALBUTEROL SULFATE 2.5; .5 MG/3ML; MG/3ML
3 SOLUTION RESPIRATORY (INHALATION)
Status: DISCONTINUED | OUTPATIENT
Start: 2021-02-25 | End: 2021-02-26 | Stop reason: HOSPADM

## 2021-02-25 RX ORDER — BENZONATATE 100 MG/1
100 CAPSULE ORAL 3 TIMES DAILY PRN
Status: DISCONTINUED | OUTPATIENT
Start: 2021-02-25 | End: 2021-02-26 | Stop reason: HOSPADM

## 2021-02-25 RX ORDER — LEVOFLOXACIN 500 MG/1
500 TABLET, FILM COATED ORAL DAILY
Status: DISCONTINUED | OUTPATIENT
Start: 2021-02-26 | End: 2021-02-26

## 2021-02-25 RX ADMIN — MORPHINE SULFATE 4 MG: 4 INJECTION INTRAVENOUS at 21:09

## 2021-02-25 RX ADMIN — GUAIFENESIN 600 MG: 600 TABLET, EXTENDED RELEASE ORAL at 17:09

## 2021-02-25 RX ADMIN — PROCHLORPERAZINE EDISYLATE 10 MG: 5 INJECTION INTRAMUSCULAR; INTRAVENOUS at 17:01

## 2021-02-25 RX ADMIN — ALPRAZOLAM 1 MG: 1 TABLET ORAL at 02:12

## 2021-02-25 RX ADMIN — MORPHINE SULFATE 4 MG: 4 INJECTION INTRAVENOUS at 03:09

## 2021-02-25 RX ADMIN — ASPIRIN 81 MG: 81 TABLET, COATED ORAL at 06:07

## 2021-02-25 RX ADMIN — ALBUTEROL SULFATE 2.5 MG: 2.5 SOLUTION RESPIRATORY (INHALATION) at 02:41

## 2021-02-25 RX ADMIN — HEPARIN SODIUM 5000 UNITS: 5000 INJECTION, SOLUTION INTRAVENOUS; SUBCUTANEOUS at 06:07

## 2021-02-25 RX ADMIN — ALBUTEROL SULFATE 2 PUFF: 90 AEROSOL, METERED RESPIRATORY (INHALATION) at 22:30

## 2021-02-25 RX ADMIN — ALPRAZOLAM 1 MG: 1 TABLET ORAL at 09:24

## 2021-02-25 RX ADMIN — OXYCODONE HYDROCHLORIDE AND ACETAMINOPHEN 1 TABLET: 5; 325 TABLET ORAL at 06:06

## 2021-02-25 RX ADMIN — GUAIFENESIN 600 MG: 600 TABLET, EXTENDED RELEASE ORAL at 09:24

## 2021-02-25 RX ADMIN — ALBUTEROL SULFATE 2 PUFF: 90 AEROSOL, METERED RESPIRATORY (INHALATION) at 08:29

## 2021-02-25 RX ADMIN — IPRATROPIUM BROMIDE AND ALBUTEROL SULFATE 3 ML: .5; 3 SOLUTION RESPIRATORY (INHALATION) at 15:57

## 2021-02-25 RX ADMIN — TRAZODONE HYDROCHLORIDE 100 MG: 100 TABLET ORAL at 21:10

## 2021-02-25 RX ADMIN — HYDROCODONE BITARTRATE AND ACETAMINOPHEN 2 TABLET: 5; 325 TABLET ORAL at 09:48

## 2021-02-25 RX ADMIN — HEPARIN SODIUM 5000 UNITS: 5000 INJECTION, SOLUTION INTRAVENOUS; SUBCUTANEOUS at 21:09

## 2021-02-25 RX ADMIN — ALPRAZOLAM 1 MG: 1 TABLET ORAL at 17:01

## 2021-02-25 RX ADMIN — MORPHINE SULFATE 4 MG: 4 INJECTION INTRAVENOUS at 17:01

## 2021-02-25 RX ADMIN — MORPHINE SULFATE 4 MG: 4 INJECTION INTRAVENOUS at 08:29

## 2021-02-25 RX ADMIN — LEVOFLOXACIN 750 MG: 750 INJECTION, SOLUTION INTRAVENOUS at 06:07

## 2021-02-25 RX ADMIN — MORPHINE SULFATE 4 MG: 4 INJECTION INTRAVENOUS at 12:11

## 2021-02-25 RX ADMIN — BENZONATATE 100 MG: 100 CAPSULE ORAL at 06:06

## 2021-02-25 RX ADMIN — DOCUSATE SODIUM 50 MG AND SENNOSIDES 8.6 MG 2 TABLET: 8.6; 5 TABLET, FILM COATED ORAL at 06:07

## 2021-02-25 RX ADMIN — IPRATROPIUM BROMIDE AND ALBUTEROL SULFATE 3 ML: .5; 3 SOLUTION RESPIRATORY (INHALATION) at 18:48

## 2021-02-25 RX ADMIN — LEVOTHYROXINE SODIUM 125 MCG: 0.12 TABLET ORAL at 06:08

## 2021-02-25 RX ADMIN — HEPARIN SODIUM 5000 UNITS: 5000 INJECTION, SOLUTION INTRAVENOUS; SUBCUTANEOUS at 14:23

## 2021-02-25 ASSESSMENT — PAIN DESCRIPTION - PAIN TYPE
TYPE: CHRONIC PAIN

## 2021-02-25 ASSESSMENT — ENCOUNTER SYMPTOMS
COUGH: 1
FALLS: 0
ABDOMINAL PAIN: 0
CHILLS: 0
VOMITING: 0
MYALGIAS: 1
NAUSEA: 1
FLANK PAIN: 0
FEVER: 0
PALPITATIONS: 0
DIZZINESS: 0
HEADACHES: 1
SHORTNESS OF BREATH: 1
LOSS OF CONSCIOUSNESS: 0
SPUTUM PRODUCTION: 1
NERVOUS/ANXIOUS: 1

## 2021-02-25 NOTE — ED NOTES
Patient called on call light, requesting help reconnecting self to monitoring cords. Pt continues to complain and yell about what she is unhappy about from encounter below. This RN is unable to help the patient calm down and resituate herself. This RN has offered to talk with physician in regards to pain relief, bring her a new gown and reconnect her to the monitor. Patient is refusing to allow this RN to help. Another RN has stepped in to accomodate patient and this RN has left the room per patient request.

## 2021-02-25 NOTE — ED NOTES
Pharmacy Medication Reconciliation    Medication reconciliation has been completed by interviewing patient with consent to do so with family/visitors in the room.   Allergies were reviewed and updated   No ORAL antibiotics within the past 14 days  Pt home pharmacy:[facility]             Sent generic viagra, may or may not be cheaper.   Thanks

## 2021-02-25 NOTE — RESPIRATORY CARE
Received call from RN, patient requesting a breathing treatment, Patient assessed by this RT, breath sounds were clear diminished throughout, SVN given but also educated patient on why she was likely short of breath, as pt is having coughing spells causing her SOB, This RT spoke with S1 charge and suggested that pt gets cough suppressant, S1 charge said she would pass on to primary RN.    0255: Also educated pt on the use of Incentive Spirometer, pt currently pulling 2000mL, and was advised to used throughout the day.

## 2021-02-25 NOTE — PROGRESS NOTES
[facility] Medicine Daily Progress Note    Date of Service  2/25/2021    Chief Complaint  45 y.o. female admitted 2/24/2021 with worsening SOB.     Hospital Course  No notes on file    Interval Problem Update   at the bedside, both their questions answered. She is in no acute distress but does appear fatigued and with a productive cough. Requiring 4L NC to maintain her saturations.     Consultants/Specialty  None    Code Status  Full Code    Disposition  Anticipate home in ~2 days.     Review of Systems  Review of Systems   Constitutional: Positive for malaise/fatigue. Negative for chills and fever.   HENT: Positive for congestion.    Respiratory: Positive for cough, sputum production and shortness of breath.    Cardiovascular: Negative for chest pain, palpitations and leg swelling.   Gastrointestinal: Positive for nausea. Negative for abdominal pain and vomiting.   Genitourinary: Negative for dysuria and flank pain.   Musculoskeletal: Positive for myalgias. Negative for falls.   Neurological: Positive for headaches. Negative for dizziness and loss of consciousness.   Psychiatric/Behavioral: The patient is nervous/anxious.    All other systems reviewed and are negative.       Physical Exam  Temp:  [36.6 °C (97.8 °F)-37.1 °C (98.7 °F)] 36.6 °C (97.8 °F)  Pulse:  [] 76  Resp:  [15-20] 17  BP: (107-128)/(55-79) 108/70  SpO2:  [87 %-97 %] 90 %    Physical Exam  Vitals reviewed.   Constitutional:       General: She is not in acute distress.     Appearance: She is ill-appearing. She is not diaphoretic.      Interventions: Nasal cannula in place.   HENT:      Head: Normocephalic.      Mouth/Throat:      Mouth: Mucous membranes are dry.   Eyes:      General: No scleral icterus.     Extraocular Movements: Extraocular movements intact.   Cardiovascular:      Rate and Rhythm: Normal rate and regular rhythm.      Pulses: Normal pulses.   Pulmonary:      Effort: No respiratory distress.      Breath sounds: Transmitted  upper airway sounds present. No stridor. No wheezing.      Comments: Poor air flow throughout. No overt wheezing but with prolonged expiratory phase.  Abdominal:      General: There is no distension.      Palpations: Abdomen is soft.      Tenderness: There is no abdominal tenderness. There is no guarding.   Musculoskeletal:         General: No tenderness.      Cervical back: Normal range of motion. No rigidity. No muscular tenderness.      Right lower leg: No edema.      Left lower leg: No edema.   Skin:     General: Skin is warm and dry.      Coloration: Skin is not jaundiced.   Neurological:      General: No focal deficit present.      Mental Status: She is alert and oriented to person, place, and time.   Psychiatric:         Mood and Affect: Mood is anxious.         Speech: Speech normal.         Behavior: Behavior normal. Behavior is cooperative.         Fluids    Intake/Output Summary (Last 24 hours) at 2/25/2021 0724  Last data filed at 2/24/2021 1717  Gross per 24 hour   Intake 100 ml   Output --   Net 100 ml       Laboratory  Recent Labs     02/24/21  1330 02/25/21  0309   WBC 7.2 6.6   RBC 4.72 4.65   HEMOGLOBIN 10.0* 9.7*   HEMATOCRIT 31.2* 30.8*   MCV 66.1* 66.2*   MCH 21.2* 20.9*   MCHC 32.1* 31.5*   RDW 34.8* 34.2*   PLATELETCT 173 208   MPV 9.9 10.3     Recent Labs     02/24/21  1330 02/25/21  0309   SODIUM 139 140   POTASSIUM 4.1 4.1   CHLORIDE 104 102   CO2 25 26   GLUCOSE 94 132*   BUN 13 10   CREATININE 0.56 0.51   CALCIUM 9.3 9.5                   Imaging  DX-CHEST-PORTABLE (1 VIEW)   Final Result      Bilateral perihilar infiltrates with diffuse interstitial prominence. These findings are consistent with pneumonia. Consideration should be given for Covid pneumonia.      IR-US GUIDED PIV    (Results Pending)        Assessment/Plan  * Acute respiratory failure with hypoxia (HCC)- (present on admission)  Assessment & Plan  Secondary to COPD exacerbation and viral vs atypical pneumonia. Not on oxygen  at baseline. COVID, RSV, and influenza negative.   - RT protocol  - flutter valve, mucinex to help mobilize secretions  - levaquin as noted below    COPD (chronic obstructive pulmonary disease) (HCC)- (present on admission)  Assessment & Plan  With mild acute exacerbation.   - continue inhalers  - duoneb q6 hrs  - RT protocol  - smoking cessation education and counseling     Pneumonia- (present on admission)  Assessment & Plan  Admitted for presumed CAP. She is not septic. No leukocytosis, procal is normal. COVID, RSV and influenza negative. Atypical pneumonia vs viral?  - follow up studies for atypical pneumonia; sputum and blood cultures  - RT protocol; wean O2 as able  - continue levaquin for atypical coverage given clinical symptoms and CXR with interstitial infiltrates    Anemia- (present on admission)  Assessment & Plan  Chronic. Hemoglobin stable. No acute or active bleeding  - transfuse if hemoglobin < 7  - continue iron supplement    Anxiety- (present on admission)  Assessment & Plan  - Continue home PRN Xanax    Tobacco abuse- (present on admission)  Assessment & Plan  Continue to educate and  on the importance of cessation. Reports that she has cut down but does occassional smoke marijuana.   - nicotine replacement available if needed    Hypothyroidism- (present on admission)  Assessment & Plan  History of Graves' disease, not in acute crisis or thyroid storm  - Continue home Synthroid       VTE prophylaxis: heparin.

## 2021-02-25 NOTE — ED NOTES
Assist RN: Responded to call light. Pt very upset about care received. Apologies and comfort measures provided. Pt c/o pain, refuses robaxin and tylenol. Hospitalist paged for orders.

## 2021-02-25 NOTE — ED NOTES
"Rounded on patient. Patient is sitting on edge of bed, using cell phone. Pt got off cell phone and started yelling at RN in room. Pt continues to list all the things she needs from the nursing staff, but will not allow RN to help her. Pt is frustrated that \"all these people have come in to help me, but no one is listening to me.\" This RN stood at pt bedside, listening to all pt needs. Pt states she needs pain medication and a bedside commode. Pt informed that order for Robaxin has been placed for her muscular pain, although patient refusing to take medication. Pt states \"that is not going to help me. I need morphine. The doctor earlier gave me morphine for my pain and diarrhea. I cannot walk into the bathroom because I am dizzy and no one will help me.\" Patient was informed of PRN medications on her chart from admitting physican, including Tylenol, which patient declined because she has a \"disorderd that I cannot take Tylenol\". Pt then continued to yell at RN. Pt was asked to quit yelling and informed that this RN was available to help make her more comfortable. Pt refusing to allow RN to help or speak with RN in a reasonable manner about her complaints. Pt then disconnected herself from the monitoring lines and walked into the bathroom, inside her room, slamming door. Pt appears to have walked into the bathroom without difficulty. Will notify primary RN and attempt to help patient again.   "

## 2021-02-26 ENCOUNTER — APPOINTMENT (OUTPATIENT)
Dept: RADIOLOGY | Facility: MEDICAL CENTER | Age: 46
DRG: 190 | End: 2021-02-26
Attending: HOSPITALIST
Payer: MEDICAID

## 2021-02-26 VITALS
TEMPERATURE: 96.9 F | WEIGHT: 160.94 LBS | OXYGEN SATURATION: 92 % | DIASTOLIC BLOOD PRESSURE: 108 MMHG | SYSTOLIC BLOOD PRESSURE: 141 MMHG | BODY MASS INDEX: 24.39 KG/M2 | RESPIRATION RATE: 18 BRPM | HEART RATE: 72 BPM | HEIGHT: 68 IN

## 2021-02-26 LAB
ANION GAP SERPL CALC-SCNC: 10 MMOL/L (ref 7–16)
BASOPHILS # BLD AUTO: 0.5 % (ref 0–1.8)
BASOPHILS # BLD: 0.03 K/UL (ref 0–0.12)
BUN SERPL-MCNC: 8 MG/DL (ref 8–22)
CALCIUM SERPL-MCNC: 8.9 MG/DL (ref 8.5–10.5)
CHLORIDE SERPL-SCNC: 98 MMOL/L (ref 96–112)
CO2 SERPL-SCNC: 27 MMOL/L (ref 20–33)
CREAT SERPL-MCNC: 0.55 MG/DL (ref 0.5–1.4)
EOSINOPHIL # BLD AUTO: 0.22 K/UL (ref 0–0.51)
EOSINOPHIL NFR BLD: 3.3 % (ref 0–6.9)
ERYTHROCYTE [DISTWIDTH] IN BLOOD BY AUTOMATED COUNT: 34.3 FL (ref 35.9–50)
GLUCOSE SERPL-MCNC: 98 MG/DL (ref 65–99)
HCT VFR BLD AUTO: 30 % (ref 37–47)
HGB BLD-MCNC: 9.4 G/DL (ref 12–16)
IMM GRANULOCYTES # BLD AUTO: 0.03 K/UL (ref 0–0.11)
IMM GRANULOCYTES NFR BLD AUTO: 0.5 % (ref 0–0.9)
LYMPHOCYTES # BLD AUTO: 2.28 K/UL (ref 1–4.8)
LYMPHOCYTES NFR BLD: 34.5 % (ref 22–41)
M PNEUMO IGM SER IA-ACNC: 0 U/L
MCH RBC QN AUTO: 20.9 PG (ref 27–33)
MCHC RBC AUTO-ENTMCNC: 31.3 G/DL (ref 33.6–35)
MCV RBC AUTO: 66.8 FL (ref 81.4–97.8)
MONOCYTES # BLD AUTO: 0.28 K/UL (ref 0–0.85)
MONOCYTES NFR BLD AUTO: 4.2 % (ref 0–13.4)
NEUTROPHILS # BLD AUTO: 3.77 K/UL (ref 2–7.15)
NEUTROPHILS NFR BLD: 57 % (ref 44–72)
NRBC # BLD AUTO: 0 K/UL
NRBC BLD-RTO: 0 /100 WBC
PLATELET # BLD AUTO: 219 K/UL (ref 164–446)
PMV BLD AUTO: 10.3 FL (ref 9–12.9)
POTASSIUM SERPL-SCNC: 3.4 MMOL/L (ref 3.6–5.5)
PROCALCITONIN SERPL-MCNC: <0.05 NG/ML
RBC # BLD AUTO: 4.49 M/UL (ref 4.2–5.4)
SODIUM SERPL-SCNC: 135 MMOL/L (ref 135–145)
WBC # BLD AUTO: 6.6 K/UL (ref 4.8–10.8)

## 2021-02-26 PROCEDURE — 94640 AIRWAY INHALATION TREATMENT: CPT

## 2021-02-26 PROCEDURE — A9270 NON-COVERED ITEM OR SERVICE: HCPCS | Performed by: STUDENT IN AN ORGANIZED HEALTH CARE EDUCATION/TRAINING PROGRAM

## 2021-02-26 PROCEDURE — 80048 BASIC METABOLIC PNL TOTAL CA: CPT

## 2021-02-26 PROCEDURE — 700111 HCHG RX REV CODE 636 W/ 250 OVERRIDE (IP): Performed by: STUDENT IN AN ORGANIZED HEALTH CARE EDUCATION/TRAINING PROGRAM

## 2021-02-26 PROCEDURE — 99406 BEHAV CHNG SMOKING 3-10 MIN: CPT

## 2021-02-26 PROCEDURE — 700111 HCHG RX REV CODE 636 W/ 250 OVERRIDE (IP): Performed by: HOSPITALIST

## 2021-02-26 PROCEDURE — 700102 HCHG RX REV CODE 250 W/ 637 OVERRIDE(OP): Performed by: STUDENT IN AN ORGANIZED HEALTH CARE EDUCATION/TRAINING PROGRAM

## 2021-02-26 PROCEDURE — 36415 COLL VENOUS BLD VENIPUNCTURE: CPT

## 2021-02-26 PROCEDURE — 85025 COMPLETE CBC W/AUTO DIFF WBC: CPT

## 2021-02-26 PROCEDURE — 84145 PROCALCITONIN (PCT): CPT

## 2021-02-26 PROCEDURE — 700101 HCHG RX REV CODE 250: Performed by: HOSPITALIST

## 2021-02-26 PROCEDURE — 700102 HCHG RX REV CODE 250 W/ 637 OVERRIDE(OP): Performed by: HOSPITALIST

## 2021-02-26 PROCEDURE — A9270 NON-COVERED ITEM OR SERVICE: HCPCS | Performed by: HOSPITALIST

## 2021-02-26 PROCEDURE — 94664 DEMO&/EVAL PT USE INHALER: CPT

## 2021-02-26 RX ORDER — POTASSIUM CHLORIDE 20 MEQ/1
20 TABLET, EXTENDED RELEASE ORAL ONCE
Status: COMPLETED | OUTPATIENT
Start: 2021-02-26 | End: 2021-02-26

## 2021-02-26 RX ORDER — GUAIFENESIN 600 MG/1
600 TABLET, EXTENDED RELEASE ORAL EVERY 12 HOURS
Qty: 28 TABLET | Refills: 0 | Status: SHIPPED | OUTPATIENT
Start: 2021-02-26 | End: 2024-02-15

## 2021-02-26 RX ORDER — HYDROCODONE BITARTRATE AND ACETAMINOPHEN 5; 325 MG/1; MG/1
1-2 TABLET ORAL EVERY 4 HOURS PRN
Status: DISCONTINUED | OUTPATIENT
Start: 2021-02-26 | End: 2021-02-26 | Stop reason: HOSPADM

## 2021-02-26 RX ORDER — PREDNISONE 20 MG/1
40 TABLET ORAL DAILY
Status: DISCONTINUED | OUTPATIENT
Start: 2021-02-26 | End: 2021-02-26 | Stop reason: HOSPADM

## 2021-02-26 RX ORDER — ALBUTEROL SULFATE 90 UG/1
2 AEROSOL, METERED RESPIRATORY (INHALATION) EVERY 4 HOURS PRN
Qty: 1 EACH | Refills: 0 | Status: SHIPPED | OUTPATIENT
Start: 2021-02-26

## 2021-02-26 RX ORDER — ALBUTEROL SULFATE 2.5 MG/3ML
2.5 SOLUTION RESPIRATORY (INHALATION) EVERY 4 HOURS PRN
Qty: 30 BULLET | Refills: 0 | Status: SHIPPED | OUTPATIENT
Start: 2021-02-26

## 2021-02-26 RX ORDER — PREDNISONE 20 MG/1
40 TABLET ORAL DAILY
Qty: 4 TABLET | Refills: 0 | Status: ON HOLD | OUTPATIENT
Start: 2021-02-26 | End: 2021-03-01

## 2021-02-26 RX ADMIN — POTASSIUM CHLORIDE 20 MEQ: 1500 TABLET, EXTENDED RELEASE ORAL at 08:18

## 2021-02-26 RX ADMIN — ALBUTEROL SULFATE 2 PUFF: 90 AEROSOL, METERED RESPIRATORY (INHALATION) at 03:37

## 2021-02-26 RX ADMIN — IPRATROPIUM BROMIDE AND ALBUTEROL SULFATE 3 ML: .5; 3 SOLUTION RESPIRATORY (INHALATION) at 07:18

## 2021-02-26 RX ADMIN — PREDNISONE 40 MG: 20 TABLET ORAL at 08:18

## 2021-02-26 RX ADMIN — DOCUSATE SODIUM 50 MG AND SENNOSIDES 8.6 MG 2 TABLET: 8.6; 5 TABLET, FILM COATED ORAL at 06:33

## 2021-02-26 RX ADMIN — ASPIRIN 81 MG: 81 TABLET, COATED ORAL at 06:33

## 2021-02-26 RX ADMIN — GUAIFENESIN 600 MG: 600 TABLET, EXTENDED RELEASE ORAL at 16:01

## 2021-02-26 RX ADMIN — IPRATROPIUM BROMIDE AND ALBUTEROL SULFATE 3 ML: .5; 3 SOLUTION RESPIRATORY (INHALATION) at 15:07

## 2021-02-26 RX ADMIN — LEVOFLOXACIN 500 MG: 500 TABLET, FILM COATED ORAL at 06:33

## 2021-02-26 RX ADMIN — HYDROCODONE BITARTRATE AND ACETAMINOPHEN 2 TABLET: 5; 325 TABLET ORAL at 08:18

## 2021-02-26 RX ADMIN — BENZONATATE 100 MG: 100 CAPSULE ORAL at 03:48

## 2021-02-26 RX ADMIN — GUAIFENESIN 600 MG: 600 TABLET, EXTENDED RELEASE ORAL at 06:33

## 2021-02-26 RX ADMIN — MORPHINE SULFATE 4 MG: 4 INJECTION INTRAVENOUS at 03:33

## 2021-02-26 RX ADMIN — LEVOTHYROXINE SODIUM 125 MCG: 0.12 TABLET ORAL at 06:34

## 2021-02-26 RX ADMIN — MORPHINE SULFATE 4 MG: 4 INJECTION INTRAVENOUS at 16:01

## 2021-02-26 RX ADMIN — ALPRAZOLAM 1 MG: 1 TABLET ORAL at 12:16

## 2021-02-26 RX ADMIN — HEPARIN SODIUM 5000 UNITS: 5000 INJECTION, SOLUTION INTRAVENOUS; SUBCUTANEOUS at 06:33

## 2021-02-26 RX ADMIN — ALPRAZOLAM 1 MG: 1 TABLET ORAL at 03:48

## 2021-02-26 NOTE — CARE PLAN
Problem: Psychosocial Needs:  Goal: Level of anxiety will decrease  Outcome: NOT MET     Problem: Safety  Goal: Will remain free from injury  Outcome: PROGRESSING AS EXPECTED     Problem: Pain Management  Goal: Pain level will decrease to patient's comfort goal  Outcome: PROGRESSING AS EXPECTED     Problem: Respiratory:  Goal: Respiratory status will improve  Outcome: PROGRESSING AS EXPECTED    Pt is very anxious and demanding for entirety of shift. Pt has constant pain in back and chest r/t coughing and is requesting PRN morphine Q4H. Strong non productive cough noted. Pt is up independently to bathroom and voided overnight. Safety maintained with nonskid socks on, bed locked in lowest position and call light in reach. Frequent rounding performed. Pt oxygen needs increase around 0400 when pt began to have a coughing fit. Pt was satting 85% still 5 minutes later. 02 was turned up to 8L NC at this point and pt is now satting >90%. Will reassess later to see if 02 can be decreased again. Pt has voiced frustrations over PIVs overnight multiple times asking for them to be removed. Pt was educated that it would be best to keep them in for now incase of emergency or if the other one was to stop working, as she is a hard stick. Care to continue and will notify Hospitalist of any acute changes.

## 2021-02-26 NOTE — FACE TO FACE
"Face to Face Note  -  Durable Medical Equipment    Aileen Leavitt M.D. - NPI: [id number]  I certify that this patient is under my care and that they had a durable medical equipment(DME)face to face encounter by myself that meets the physician DME face-to-face encounter requirements with this patient on:    Date of encounter:   Patient:                    MRN:                       YOB: 2020  Madelyn Robb  [id number]  1975     The encounter with the patient was in whole, or in part, for the following medical condition, which is the primary reason for durable medical equipment:  COPD    I certify that, based on my findings, the following durable medical equipment is medically necessary:  Oxygen.    HOME O2 Saturation Measurements:(Values must be present for Home Oxygen orders)  Room air sat at rest: 94  Room air sat with amb: 86  With liters of O2: 3, O2 sat at rest with O2: 96  With Liters of O2: 3, O2 sat with amb with O2 : 95  Is the patient mobile?: Yes    My Clinical findings support the need for the above equipment due to:  Hypoxia    Supporting Symptoms: The patient requires supplemental oxygen, as the following interventions have been tried with limited or no improvement: \"Oral and/or IV steroids, \"Ambulation with oximetry and \"Incentive spirometry    If patient feels more short of breath, they can go up to 6 liters per minute and contact healthcare provider.  "

## 2021-02-26 NOTE — DISCHARGE PLANNING
Care Transition Team Assessment      This RN CM spoke to pt via phone, verified facesheet and obtained the following information. Pt lives in a 2 story house with her spouse and friend. Pt denies use of home oxygen or other DMEs.  Pt was independent with ADLs and IADLs prior to hospitalization. Pt has insurance coverage. Pt has a PCP, JOSE MIGUEL Petit at [facility]. Pt's preferred pharmacy is [facility].             Information Source  Information Given By: Patient  Informant's Name: Madelyn Robb       Elopement Risk  Legal Hold: No  Ambulatory or Self Mobile in Wheelchair: Yes  Disoriented: No  Psychiatric Symptoms: None  History of Wandering: No  Elopement this Admit: No  Vocalizing Wanting to Leave: No  Displays Behaviors, Body Language Wanting to Leave: No-Not at Risk for Elopement  Elopement Risk: Not at Risk for Elopement    Interdisciplinary Discharge Planning  Primary Care Physician: Emy CLAYTON APN  Lives with - Patient's Self Care Capacity: Spouse, Friends  Patient or legal guardian wants to designate a caregiver: No  Support Systems: Spouse / Significant Other, Friends / Neighbors  Housing / Facility: 2 Story House  Prior Services: None  Durable Medical Equipment: Not Applicable    Discharge Preparedness  What is your plan after discharge?: Uncertain - pending medical team collaboration  What are your discharge supports?: Spouse  Prior Functional Level: Independent with Activities of Daily Living    Functional Assesment  Prior Functional Level: Independent with Activities of Daily Living    Finances  Financial Barriers to Discharge: No  Prescription Coverage: Yes    Vision / Hearing Impairment  Vision Impairment : No  Hearing Impairment : No         Advance Directive  Advance Directive?: None  Advance Directive offered?: AD Booklet refused    Domestic Abuse  Have you ever been the victim of abuse or violence?: No  Physical Abuse or Sexual Abuse: No  Verbal Abuse or  Emotional Abuse: No  Possible Abuse/Neglect Reported to:: Not Applicable         Discharge Risks or Barriers  Discharge risks or barriers?: Complex medical needs  Patient risk factors: Complex medical needs    Anticipated Discharge Information  Discharge Disposition: Still a Patient (30)

## 2021-02-26 NOTE — RESPIRATORY CARE
"  COPD EDUCATION by COPD CLINICAL EDUCATOR  2/26/2021  at  10:37 AM by Renetta Freed, RRT     Patient interviewed by COPD education team.  Patient unable to participate in full program.  Short intervention has been conducted.  A comprehensive packet including information about COPD and home treatment with spacer instruct, nebulizer cleaning. Patient states her nebulizer is broken. Notified MD for a replacement order.       Patient states she quit smoking last Friday; Smoking Cessation Intervention and education completed, 4 minutes spent on smoking cessation education with patient.    Provided smoking cessation packet with \"Tips to Quit\" and brochure for \"Free Smoking Cessation Classes\".         COPD Assessment  COPD Clinical Specialists ONLY  COPD Education Initiated: Yes--Short Intervention  DQ Reason:: COPD education performed. request for nebulizer. patient quit smoking 1 week ago. smoking cessation provided.  Physician Name: contacted W  Pulmonologist Name: does not qualify for [facility] pulmonary  Referrals Initiated: Initiated  Smoking Cessation: Yes  $ Smoking Cessation 3-10 Minutes: Symptomatic  Home Health Care: (contacted MD)  [facility] Outreach: Yes  [facility]: (patient interested, awaiting for deposition)  Is this a COPD exacerbation patient?: Yes  $ Demo/Eval of SVN's, MDI's and Aerosols: Yes     MY COPD ACTION PLAN     It is recommended that patients and physicians /healthcare providers complete this action plan together. This plan should be discussed at each physician visit and updated as needed.    The green, yellow and red zones show groups of symptoms of COPD. This list of symptoms is not comprehensive, and you may experience other symptoms. In the \"Actions\" column, your healthcare provider has recommended actions for you to take based on your symptoms.    Patient Name: Madelyn Robb   YOB: 1975   Last Updated on:     Green Zone:  I am doing well today " "Actions   •  Usual activitiy and exercise level •  Take daily medications   •  Usual amounts of cough and phlegm/mucus •  Use oxygen as prescribed   •  Sleep well at night •  Continue regular exercise/diet plan   •  Appetite is good •  At all times avoid cigarette smoke, inhaled irritants     Daily Medications (these medications are taken every day):                Yellow Zone:  I am having a bad day or a COPD flare Actions   •  More breathless than usual •  Continue daily medications   •  I have less energy for my daily activities •  Use quick relief inhaler as ordered   •  Increased or thicker phlegm/mucus •  Use oxygen as prescribed   •  Using quick relief inhaler/nebulizer more often •  Get plenty of rest   •  Swelling of ankles more than usual •  Use pursed lip breathing   •  More coughing than usual •  At all times avoid cigarette smoke, inhaled irritants   •  I feel like I have a \"chest cold\"   •  Poor sleep and my symptoms woke me up   •  My appetite is not good   •  My medicine is not helping    •  Call provider immediately if symptoms don’t improve     Continue daily medications, add rescue medications:   Albuterol  Albuterol 2.5mg via nebulizer  2 Puffs Every 4 hours PRN  Every 4 hours PRN       Medications to be used during a flare up, (as Discussed with Provider):           Additional Information:  Clean nebulizer after use per manufacturers recommendation and use albuterol inhaler with spacer    Red Zone:  I need urgent medical care Actions   •  Severe shortness of breath even at rest •  Call 911 or seek medical care immediately   •  Not able to do any activity because of breathing    •  Fever or shaking chills    •  Feeling confused or very drowsy     •  Chest pains    •  Coughing up blood              "

## 2021-02-26 NOTE — FACE TO FACE
Face to Face Supporting Documentation - Home Health    The encounter with this patient was in whole or in part the primary reason for home health admission.    Date of encounter:   Patient:                    MRN:                       YOB: 2020  Madelyn Robb  [id number]  1975     Home health to see patient for:  Skilled Nursing care for assessment, interventions & education, Physical Therapy evaluation and treatment and Occupational therapy evaluation and treatment    Skilled need for:  Recent Deterioration of Health Status COPD exacerbation    Skilled nursing interventions to include:  Comment: medication/nebulizer education    Homebound status evidenced by:  Needs the assistance of another person in order to leave the home. Leaving home requires a considerable and taxing effort. There is a normal inability to leave the home.    Community Physician to provide follow up care: Pcp Not In Computer     Optional Interventions? No      I certify the face to face encounter for this home health care referral meets the CMS requirements and the encounter/clinical assessment with the patient was, in whole, or in part, for the medical condition(s) listed above, which is the primary reason for home health care. Based on my clinical findings: the service(s) are medically necessary, support the need for home health care, and the homebound criteria are met.  I certify that this patient has had a face to face encounter by myself.  Aileen Leavitt M.D. - NPI: [id number]

## 2021-02-26 NOTE — DISCHARGE PLANNING
Received Choice form at 1452  Agency/Facility Name: HH - [facility]) [facility], 2) [facility]  Referral sent per Choice form @ 1501     Received Choice form at 1451  Agency/Facility Name: DME - [facility]) [facility], 2) [facility]  Referral sent per Choice form @ 1502

## 2021-02-26 NOTE — PROGRESS NOTES
At 0630 this RN went to administer patients morning medications. At this time IV morphine was to be administered. At this time RN noticed both IV's to be infilitrated and unable to use. Notified patient that I would pass on to dayshift RN to reach out to primary team about plan for IV access. Pt was understanding of this. Report given to Feng ROMERO about this issue and plan. Care released at this time.

## 2021-02-26 NOTE — DISCHARGE PLANNING
Anticipated Discharge Disposition:   Home with home health, home oxygen    Action:   Discussed discharge planning needs during rounds.   Per bedside RN, pt wanting to leave AMA.    RN AL spoke to pt via phone. Pt said she is putting her clothes and leaving, verbalized frustration about care received. Per pt, she has been waiting for ICU RN to insert an IV. Charge RN informed via Voalte.    Informed pt about HH and DME home oxygen order. Received HH choice: 1- [facility], 2- [facility]; DME choice: 1- [facility], 2- [facility]. Choice form faxed to [facility]. Informed pt about referral process and waiting for acceptance.     Barriers to Discharge:   Medical clearance  HH acceptance  DME acceptance    Plan:   Follow up with [facility].  Hospital Care Management will continue to follow and assist with discharge planning needs.

## 2021-02-26 NOTE — DISCHARGE PLANNING
Anticipated Discharge Disposition:   TBD    Action:   Discussed discharge planning needs during rounds. Per chart review, pt on 5 LPM oxygen. Per MD, not medically cleared at this time.    Barriers to Discharge:   Medical clearance    Plan:   Hospital Care Management will continue to follow and assist with discharge planning needs.

## 2021-02-26 NOTE — DISCHARGE SUMMARY
Discharge Summary    CHIEF COMPLAINT ON ADMISSION  Chief Complaint   Patient presents with   • Congestion   • Shortness of Breath       Reason for Admission  Shortness of Breath     Admission Date  2/24/2021    CODE STATUS  Full Code    HPI & HOSPITAL COURSE  This is a 45 y.o. female former smoker with COPD and Graves disease here with worsening shortness of breath. She was initially admitted for hypoxia secondary to presumed pneumonia. She was started on antibiotics but her procal was low so they were discontinued. On evaluation she was in an acute COPD exacerbation so she met with the COPD educator, RT protocol with scheduled duonebs, prednisone and supplemental O2. Home nebulizer was ordered as her's was broken. Her oxygen requirement ranged from 4-8L NC.     Unfortunately, she decided to leave AMA when a midline would not be placed so she could receive IV morphine. Her PIV had infiltrated but since she no longer required antibiotics, there was no indication for IV access. She had no indication for a midline and the risk of infection was explained to her. She then requested IM morphine. Home oxygen was set up and she waited for this to arrive, then she left AMA.     Therefore, she is discharged in guarded and stable condition against medcial advice.    The patient met 2-midnight criteria for an inpatient stay at the time of discharge.    Discharge Date  2/26/2021    FOLLOW UP ITEMS POST DISCHARGE  Please follow up with your PCP.     DISCHARGE DIAGNOSES  Principal Problem:    Acute respiratory failure with hypoxia (HCC) POA: Yes  Active Problems:    COPD (chronic obstructive pulmonary disease) (HCC) POA: Yes    Anemia POA: Yes    Anxiety POA: Yes    Hypothyroidism POA: Yes    Tobacco abuse POA: Yes  Resolved Problems:    * No resolved hospital problems. *      FOLLOW UP  No future appointments.  No follow-up provider specified.    MEDICATIONS ON DISCHARGE     Medication List      START taking these medications       Instructions   guaiFENesin  MG Tb12  Commonly known as: MUCINEX   Take 1 tablet by mouth every 12 hours.  Dose: 600 mg     predniSONE 20 MG Tabs  Commonly known as: DELTASONE   Take 2 Tablets by mouth every day.  Dose: 40 mg        CONTINUE taking these medications      Instructions   acetaminophen 325 MG Tabs  Commonly known as: Tylenol   Take 2 Tabs by mouth every 6 hours as needed (Mild Pain; (Pain scale 1-3); Temp greater than 100.5 F).  Dose: 650 mg     * albuterol 108 (90 Base) MCG/ACT Aers inhalation aerosol  Commonly known as: Ventolin HFA   Inhale 2 Puffs every four hours as needed for Shortness of Breath.  Dose: 2 Puff     * albuterol 2.5mg/3ml Nebu solution for nebulization  Commonly known as: PROVENTIL   Take 3 mL by nebulization every four hours as needed for Shortness of Breath. Use as needed  Dose: 2.5 mg     ALPRAZolam 1 MG Tabs  Commonly known as: XANAX   Take 1 mg by mouth 2 (two) times a day. TID PRN  Indications: Feeling Anxious, Trouble Sleeping  Dose: 1 mg     aspirin EC 81 MG Tbec  Commonly known as: ECOTRIN   Take 81 mg by mouth every day.  Dose: 81 mg     DAYQUIL PO   Take 2 Capsules by mouth every four hours as needed (allergy).  Dose: 2 capsule     ibuprofen 200 MG Tabs  Commonly known as: MOTRIN   Take 600 mg by mouth 2 (two) times a day.  Dose: 600 mg     levothyroxine 125 MCG Tabs  Commonly known as: SYNTHROID   Take 125 mcg by mouth Every morning on an empty stomach.  Dose: 125 mcg         * This list has 2 medication(s) that are the same as other medications prescribed for you. Read the directions carefully, and ask your doctor or other care provider to review them with you.                Allergies  Allergies   Allergen Reactions   • Carbamazepine Rash and Swelling     .   • Cephalexin Rash and Swelling     Full body sweeling and rash    • Mushroom Extract Complex Swelling   • Penicillins Anaphylaxis     Reported that her throat closes   • Phenytoin Swelling     Swelling   •  Arturoc Rash and Swelling     .   • Sulfa Drugs Anaphylaxis     Reported that her throat closes   • Cefadroxil Unspecified     Pt is not sure, but knows that she has an allergy to this medication.       DIET  Orders Placed This Encounter   Procedures   • Diet Order Diet: Regular (no melons and no grapefruit per patient request)     Standing Status:   Standing     Number of Occurrences:   1     Order Specific Question:   Diet:     Answer:   Regular [1]     Comments:   no melons and no grapefruit per patient request       ACTIVITY  As tolerated.  Weight bearing as tolerated    CONSULTATIONS  None    PROCEDURES  IR-US GUIDED PIV   Final Result    Ultrasound-guided PERIPHERAL IV INSERTION performed by    qualified nursing staff as above.      DX-CHEST-PORTABLE (1 VIEW)   Final Result      Bilateral perihilar infiltrates with diffuse interstitial prominence. These findings are consistent with pneumonia. Consideration should be given for Covid pneumonia.            LABORATORY  Lab Results   Component Value Date    SODIUM 135 02/26/2021    POTASSIUM 3.4 (L) 02/26/2021    CHLORIDE 98 02/26/2021    CO2 27 02/26/2021    GLUCOSE 98 02/26/2021    BUN 8 02/26/2021    CREATININE 0.55 02/26/2021        Lab Results   Component Value Date    WBC 6.6 02/26/2021    HEMOGLOBIN 9.4 (L) 02/26/2021    HEMATOCRIT 30.0 (L) 02/26/2021    PLATELETCT 219 02/26/2021        Total time of the discharge process exceeds 43 minutes.

## 2021-02-27 ENCOUNTER — APPOINTMENT (OUTPATIENT)
Dept: RADIOLOGY | Facility: MEDICAL CENTER | Age: 46
DRG: 189 | End: 2021-02-27
Attending: STUDENT IN AN ORGANIZED HEALTH CARE EDUCATION/TRAINING PROGRAM
Payer: MEDICAID

## 2021-02-27 ENCOUNTER — APPOINTMENT (OUTPATIENT)
Dept: RADIOLOGY | Facility: MEDICAL CENTER | Age: 46
DRG: 189 | End: 2021-02-27
Attending: EMERGENCY MEDICINE
Payer: MEDICAID

## 2021-02-27 ENCOUNTER — HOSPITAL ENCOUNTER (INPATIENT)
Facility: MEDICAL CENTER | Age: 46
LOS: 2 days | DRG: 189 | End: 2021-03-01
Attending: EMERGENCY MEDICINE | Admitting: STUDENT IN AN ORGANIZED HEALTH CARE EDUCATION/TRAINING PROGRAM
Payer: MEDICAID

## 2021-02-27 DIAGNOSIS — J18.9 PNEUMONIA OF BOTH LUNGS DUE TO INFECTIOUS ORGANISM, UNSPECIFIED PART OF LUNG: ICD-10-CM

## 2021-02-27 DIAGNOSIS — J44.1 COPD WITH ACUTE EXACERBATION (HCC): ICD-10-CM

## 2021-02-27 PROBLEM — R07.81 PLEURITIC CHEST PAIN: Status: ACTIVE | Noted: 2018-11-03

## 2021-02-27 LAB
ALBUMIN SERPL BCP-MCNC: 3.7 G/DL (ref 3.2–4.9)
ALBUMIN/GLOB SERPL: 1.3 G/DL
ALP SERPL-CCNC: 91 U/L (ref 30–99)
ALT SERPL-CCNC: 9 U/L (ref 2–50)
ANION GAP SERPL CALC-SCNC: 10 MMOL/L (ref 7–16)
AST SERPL-CCNC: 13 U/L (ref 12–45)
BASOPHILS # BLD AUTO: 0.2 % (ref 0–1.8)
BASOPHILS # BLD: 0.01 K/UL (ref 0–0.12)
BILIRUB SERPL-MCNC: 0.3 MG/DL (ref 0.1–1.5)
BUN SERPL-MCNC: 18 MG/DL (ref 8–22)
CALCIUM SERPL-MCNC: 8.9 MG/DL (ref 8.5–10.5)
CHLORIDE SERPL-SCNC: 100 MMOL/L (ref 96–112)
CO2 SERPL-SCNC: 22 MMOL/L (ref 20–33)
CREAT SERPL-MCNC: 0.52 MG/DL (ref 0.5–1.4)
CRP SERPL HS-MCNC: 8.01 MG/DL (ref 0–0.75)
D DIMER PPP IA.FEU-MCNC: 0.85 UG/ML (FEU) (ref 0–0.5)
EKG IMPRESSION: NORMAL
EOSINOPHIL # BLD AUTO: 0 K/UL (ref 0–0.51)
EOSINOPHIL NFR BLD: 0 % (ref 0–6.9)
ERYTHROCYTE [DISTWIDTH] IN BLOOD BY AUTOMATED COUNT: 33.2 FL (ref 35.9–50)
GLOBULIN SER CALC-MCNC: 2.8 G/DL (ref 1.9–3.5)
GLUCOSE SERPL-MCNC: 135 MG/DL (ref 65–99)
HCT VFR BLD AUTO: 28.3 % (ref 37–47)
HGB BLD-MCNC: 9.1 G/DL (ref 12–16)
IMM GRANULOCYTES # BLD AUTO: 0.05 K/UL (ref 0–0.11)
IMM GRANULOCYTES NFR BLD AUTO: 0.9 % (ref 0–0.9)
LYMPHOCYTES # BLD AUTO: 0.92 K/UL (ref 1–4.8)
LYMPHOCYTES NFR BLD: 16 % (ref 22–41)
MCH RBC QN AUTO: 21 PG (ref 27–33)
MCHC RBC AUTO-ENTMCNC: 32.2 G/DL (ref 33.6–35)
MCV RBC AUTO: 65.2 FL (ref 81.4–97.8)
MONOCYTES # BLD AUTO: 0.1 K/UL (ref 0–0.85)
MONOCYTES NFR BLD AUTO: 1.7 % (ref 0–13.4)
NEUTROPHILS # BLD AUTO: 4.67 K/UL (ref 2–7.15)
NEUTROPHILS NFR BLD: 81.2 % (ref 44–72)
NRBC # BLD AUTO: 0 K/UL
NRBC BLD-RTO: 0 /100 WBC
NT-PROBNP SERPL IA-MCNC: 197 PG/ML (ref 0–125)
PLATELET # BLD AUTO: 253 K/UL (ref 164–446)
PMV BLD AUTO: 9.9 FL (ref 9–12.9)
POTASSIUM SERPL-SCNC: 4.2 MMOL/L (ref 3.6–5.5)
PROCALCITONIN SERPL-MCNC: <0.05 NG/ML
PROT SERPL-MCNC: 6.5 G/DL (ref 6–8.2)
RBC # BLD AUTO: 4.34 M/UL (ref 4.2–5.4)
SODIUM SERPL-SCNC: 132 MMOL/L (ref 135–145)
TROPONIN T SERPL-MCNC: <6 NG/L (ref 6–19)
WBC # BLD AUTO: 5.8 K/UL (ref 4.8–10.8)

## 2021-02-27 PROCEDURE — 700111 HCHG RX REV CODE 636 W/ 250 OVERRIDE (IP): Performed by: STUDENT IN AN ORGANIZED HEALTH CARE EDUCATION/TRAINING PROGRAM

## 2021-02-27 PROCEDURE — 99222 1ST HOSP IP/OBS MODERATE 55: CPT | Performed by: STUDENT IN AN ORGANIZED HEALTH CARE EDUCATION/TRAINING PROGRAM

## 2021-02-27 PROCEDURE — 700101 HCHG RX REV CODE 250: Performed by: EMERGENCY MEDICINE

## 2021-02-27 PROCEDURE — 80053 COMPREHEN METABOLIC PANEL: CPT

## 2021-02-27 PROCEDURE — 84439 ASSAY OF FREE THYROXINE: CPT

## 2021-02-27 PROCEDURE — 96374 THER/PROPH/DIAG INJ IV PUSH: CPT

## 2021-02-27 PROCEDURE — 96375 TX/PRO/DX INJ NEW DRUG ADDON: CPT

## 2021-02-27 PROCEDURE — 80307 DRUG TEST PRSMV CHEM ANLYZR: CPT

## 2021-02-27 PROCEDURE — U0005 INFEC AGEN DETEC AMPLI PROBE: HCPCS

## 2021-02-27 PROCEDURE — 84145 PROCALCITONIN (PCT): CPT

## 2021-02-27 PROCEDURE — U0003 INFECTIOUS AGENT DETECTION BY NUCLEIC ACID (DNA OR RNA); SEVERE ACUTE RESPIRATORY SYNDROME CORONAVIRUS 2 (SARS-COV-2) (CORONAVIRUS DISEASE [COVID-19]), AMPLIFIED PROBE TECHNIQUE, MAKING USE OF HIGH THROUGHPUT TECHNOLOGIES AS DESCRIBED BY CMS-2020-01-R: HCPCS

## 2021-02-27 PROCEDURE — 99285 EMERGENCY DEPT VISIT HI MDM: CPT

## 2021-02-27 PROCEDURE — 86140 C-REACTIVE PROTEIN: CPT

## 2021-02-27 PROCEDURE — 770020 HCHG ROOM/CARE - TELE (206)

## 2021-02-27 PROCEDURE — 85025 COMPLETE CBC W/AUTO DIFF WBC: CPT

## 2021-02-27 PROCEDURE — 700111 HCHG RX REV CODE 636 W/ 250 OVERRIDE (IP): Performed by: EMERGENCY MEDICINE

## 2021-02-27 PROCEDURE — 93005 ELECTROCARDIOGRAM TRACING: CPT | Performed by: STUDENT IN AN ORGANIZED HEALTH CARE EDUCATION/TRAINING PROGRAM

## 2021-02-27 PROCEDURE — 700102 HCHG RX REV CODE 250 W/ 637 OVERRIDE(OP): Performed by: EMERGENCY MEDICINE

## 2021-02-27 PROCEDURE — 84484 ASSAY OF TROPONIN QUANT: CPT

## 2021-02-27 PROCEDURE — 85379 FIBRIN DEGRADATION QUANT: CPT

## 2021-02-27 PROCEDURE — 83880 ASSAY OF NATRIURETIC PEPTIDE: CPT

## 2021-02-27 PROCEDURE — 94640 AIRWAY INHALATION TREATMENT: CPT

## 2021-02-27 PROCEDURE — 71045 X-RAY EXAM CHEST 1 VIEW: CPT

## 2021-02-27 PROCEDURE — A9270 NON-COVERED ITEM OR SERVICE: HCPCS | Performed by: EMERGENCY MEDICINE

## 2021-02-27 PROCEDURE — 84443 ASSAY THYROID STIM HORMONE: CPT

## 2021-02-27 RX ORDER — ACETAMINOPHEN 325 MG/1
650 TABLET ORAL EVERY 6 HOURS PRN
Status: DISCONTINUED | OUTPATIENT
Start: 2021-02-27 | End: 2021-02-28

## 2021-02-27 RX ORDER — ONDANSETRON 4 MG/1
4 TABLET, ORALLY DISINTEGRATING ORAL EVERY 4 HOURS PRN
Status: DISCONTINUED | OUTPATIENT
Start: 2021-02-27 | End: 2021-03-01 | Stop reason: HOSPADM

## 2021-02-27 RX ORDER — PREDNISONE 20 MG/1
60 TABLET ORAL ONCE
Status: COMPLETED | OUTPATIENT
Start: 2021-02-27 | End: 2021-02-27

## 2021-02-27 RX ORDER — LEVOTHYROXINE SODIUM 0.12 MG/1
125 TABLET ORAL
Status: DISCONTINUED | OUTPATIENT
Start: 2021-02-28 | End: 2021-02-28

## 2021-02-27 RX ORDER — LABETALOL HYDROCHLORIDE 5 MG/ML
10 INJECTION, SOLUTION INTRAVENOUS EVERY 4 HOURS PRN
Status: DISCONTINUED | OUTPATIENT
Start: 2021-02-27 | End: 2021-03-01 | Stop reason: HOSPADM

## 2021-02-27 RX ORDER — POLYETHYLENE GLYCOL 3350 17 G/17G
1 POWDER, FOR SOLUTION ORAL
Status: DISCONTINUED | OUTPATIENT
Start: 2021-02-27 | End: 2021-03-01 | Stop reason: HOSPADM

## 2021-02-27 RX ORDER — ALPRAZOLAM 1 MG/1
1 TABLET ORAL 3 TIMES DAILY PRN
Status: DISCONTINUED | OUTPATIENT
Start: 2021-02-27 | End: 2021-03-01 | Stop reason: HOSPADM

## 2021-02-27 RX ORDER — BISACODYL 10 MG
10 SUPPOSITORY, RECTAL RECTAL
Status: DISCONTINUED | OUTPATIENT
Start: 2021-02-27 | End: 2021-03-01 | Stop reason: HOSPADM

## 2021-02-27 RX ORDER — ALBUTEROL SULFATE 90 UG/1
2 AEROSOL, METERED RESPIRATORY (INHALATION) EVERY 4 HOURS PRN
Status: DISCONTINUED | OUTPATIENT
Start: 2021-02-27 | End: 2021-03-01 | Stop reason: HOSPADM

## 2021-02-27 RX ORDER — KETOROLAC TROMETHAMINE 30 MG/ML
30 INJECTION, SOLUTION INTRAMUSCULAR; INTRAVENOUS EVERY 6 HOURS PRN
Status: DISCONTINUED | OUTPATIENT
Start: 2021-02-27 | End: 2021-03-01 | Stop reason: HOSPADM

## 2021-02-27 RX ORDER — MORPHINE SULFATE 4 MG/ML
1 INJECTION, SOLUTION INTRAMUSCULAR; INTRAVENOUS
Status: DISCONTINUED | OUTPATIENT
Start: 2021-02-27 | End: 2021-02-28

## 2021-02-27 RX ORDER — AMOXICILLIN 250 MG
2 CAPSULE ORAL 2 TIMES DAILY
Status: DISCONTINUED | OUTPATIENT
Start: 2021-02-27 | End: 2021-03-01 | Stop reason: HOSPADM

## 2021-02-27 RX ORDER — ONDANSETRON 2 MG/ML
4 INJECTION INTRAMUSCULAR; INTRAVENOUS EVERY 4 HOURS PRN
Status: DISCONTINUED | OUTPATIENT
Start: 2021-02-27 | End: 2021-03-01 | Stop reason: HOSPADM

## 2021-02-27 RX ORDER — ATORVASTATIN CALCIUM 40 MG/1
40 TABLET, FILM COATED ORAL EVERY EVENING
Status: DISCONTINUED | OUTPATIENT
Start: 2021-02-27 | End: 2021-03-01 | Stop reason: HOSPADM

## 2021-02-27 RX ORDER — HEPARIN SODIUM 5000 [USP'U]/ML
5000 INJECTION, SOLUTION INTRAVENOUS; SUBCUTANEOUS EVERY 8 HOURS
Status: DISCONTINUED | OUTPATIENT
Start: 2021-02-27 | End: 2021-03-01 | Stop reason: HOSPADM

## 2021-02-27 RX ORDER — ONDANSETRON 4 MG/1
4 TABLET, ORALLY DISINTEGRATING ORAL EVERY 6 HOURS PRN
COMMUNITY

## 2021-02-27 RX ORDER — DEXAMETHASONE 4 MG/1
6 TABLET ORAL DAILY
Status: DISCONTINUED | OUTPATIENT
Start: 2021-02-28 | End: 2021-03-01 | Stop reason: HOSPADM

## 2021-02-27 RX ORDER — GUAIFENESIN 600 MG/1
600 TABLET, EXTENDED RELEASE ORAL EVERY 12 HOURS
Status: DISCONTINUED | OUTPATIENT
Start: 2021-02-27 | End: 2021-03-01 | Stop reason: HOSPADM

## 2021-02-27 RX ORDER — IPRATROPIUM BROMIDE AND ALBUTEROL SULFATE 2.5; .5 MG/3ML; MG/3ML
3 SOLUTION RESPIRATORY (INHALATION)
Status: DISCONTINUED | OUTPATIENT
Start: 2021-02-27 | End: 2021-03-01

## 2021-02-27 RX ORDER — ONDANSETRON 4 MG/1
4 TABLET, ORALLY DISINTEGRATING ORAL EVERY 6 HOURS PRN
Status: DISCONTINUED | OUTPATIENT
Start: 2021-02-27 | End: 2021-02-27

## 2021-02-27 RX ORDER — OXYCODONE HYDROCHLORIDE AND ACETAMINOPHEN 5; 325 MG/1; MG/1
1 TABLET ORAL ONCE
Status: COMPLETED | OUTPATIENT
Start: 2021-02-27 | End: 2021-02-27

## 2021-02-27 RX ORDER — PROMETHAZINE HYDROCHLORIDE 25 MG/1
12.5-25 SUPPOSITORY RECTAL EVERY 4 HOURS PRN
Status: DISCONTINUED | OUTPATIENT
Start: 2021-02-27 | End: 2021-03-01 | Stop reason: HOSPADM

## 2021-02-27 RX ORDER — AZITHROMYCIN 250 MG/1
500 TABLET, FILM COATED ORAL ONCE
Status: COMPLETED | OUTPATIENT
Start: 2021-02-27 | End: 2021-02-27

## 2021-02-27 RX ORDER — POTASSIUM CHLORIDE 1500 MG/1
20 TABLET, EXTENDED RELEASE ORAL ONCE
Status: ON HOLD | COMMUNITY
End: 2021-03-01

## 2021-02-27 RX ORDER — ACETAMINOPHEN 500 MG
1000 TABLET ORAL EVERY 6 HOURS PRN
COMMUNITY
End: 2024-02-15

## 2021-02-27 RX ADMIN — OXYCODONE HYDROCHLORIDE AND ACETAMINOPHEN 1 TABLET: 5; 325 TABLET ORAL at 21:39

## 2021-02-27 RX ADMIN — ONDANSETRON 4 MG: 2 INJECTION INTRAMUSCULAR; INTRAVENOUS at 23:49

## 2021-02-27 RX ADMIN — PREDNISONE 60 MG: 20 TABLET ORAL at 21:08

## 2021-02-27 RX ADMIN — ALBUTEROL SULFATE 2.5 MG: 2.5 SOLUTION RESPIRATORY (INHALATION) at 21:03

## 2021-02-27 RX ADMIN — KETOROLAC TROMETHAMINE 30 MG: 30 INJECTION, SOLUTION INTRAMUSCULAR; INTRAVENOUS at 23:49

## 2021-02-27 RX ADMIN — AZITHROMYCIN MONOHYDRATE 500 MG: 250 TABLET ORAL at 21:38

## 2021-02-27 ASSESSMENT — FIBROSIS 4 INDEX: FIB4 SCORE: 1.64

## 2021-02-27 ASSESSMENT — PAIN DESCRIPTION - PAIN TYPE: TYPE: CHRONIC PAIN

## 2021-02-27 NOTE — PROGRESS NOTES
Patient claims she was not receiving treatment that she needs. Patient refused having PIV on BUE even with US, EJ unable to be placed. PO pain meds available but patient refused, prefers IV pain meds. Patient received breathing treatment this morning and claims that she never gets her breathing treatment. Patient threatened to leave AMA but agreed to wait for oxygen to get here.  At this point, RN that is able to place an EJ came but her neck vein is too small for an IV. IV placed on patient's RLE instead, patient medicated per MAR and is willing to stay at least another night.  Home oxygen delivered at bedside at 1645. Patient expressed feeling of wanting to go home. Patient educated that she will be leaving AMA even with the home oxygen. Home health still pending. All belongings returned, IV pulled out. Patient left AMA at 1800

## 2021-02-28 ENCOUNTER — APPOINTMENT (OUTPATIENT)
Dept: RADIOLOGY | Facility: MEDICAL CENTER | Age: 46
DRG: 189 | End: 2021-02-28
Attending: STUDENT IN AN ORGANIZED HEALTH CARE EDUCATION/TRAINING PROGRAM
Payer: MEDICAID

## 2021-02-28 ENCOUNTER — APPOINTMENT (OUTPATIENT)
Dept: CARDIOLOGY | Facility: MEDICAL CENTER | Age: 46
DRG: 189 | End: 2021-02-28
Attending: STUDENT IN AN ORGANIZED HEALTH CARE EDUCATION/TRAINING PROGRAM
Payer: MEDICAID

## 2021-02-28 PROBLEM — Z79.899 LONG-TERM CURRENT USE OF BENZODIAZEPINE: Status: ACTIVE | Noted: 2021-02-28

## 2021-02-28 PROBLEM — J44.1 COPD WITH ACUTE EXACERBATION (HCC): Status: ACTIVE | Noted: 2021-02-24

## 2021-02-28 LAB
ALBUMIN SERPL BCP-MCNC: 4.2 G/DL (ref 3.2–4.9)
ALBUMIN/GLOB SERPL: 1.3 G/DL
ALP SERPL-CCNC: 102 U/L (ref 30–99)
ALT SERPL-CCNC: 10 U/L (ref 2–50)
AMPHET UR QL SCN: NEGATIVE
ANION GAP SERPL CALC-SCNC: 13 MMOL/L (ref 7–16)
ANION GAP SERPL CALC-SCNC: 14 MMOL/L (ref 7–16)
AST SERPL-CCNC: 12 U/L (ref 12–45)
BARBITURATES UR QL SCN: NEGATIVE
BENZODIAZ UR QL SCN: POSITIVE
BILIRUB SERPL-MCNC: 0.3 MG/DL (ref 0.1–1.5)
BUN SERPL-MCNC: 17 MG/DL (ref 8–22)
BUN SERPL-MCNC: 19 MG/DL (ref 8–22)
BZE UR QL SCN: NEGATIVE
CALCIUM SERPL-MCNC: 9.3 MG/DL (ref 8.5–10.5)
CALCIUM SERPL-MCNC: 9.3 MG/DL (ref 8.5–10.5)
CANNABINOIDS UR QL SCN: POSITIVE
CHLORIDE SERPL-SCNC: 100 MMOL/L (ref 96–112)
CHLORIDE SERPL-SCNC: 98 MMOL/L (ref 96–112)
CHOLEST SERPL-MCNC: 115 MG/DL (ref 100–199)
CO2 SERPL-SCNC: 21 MMOL/L (ref 20–33)
CO2 SERPL-SCNC: 23 MMOL/L (ref 20–33)
CREAT SERPL-MCNC: 0.62 MG/DL (ref 0.5–1.4)
CREAT SERPL-MCNC: 0.65 MG/DL (ref 0.5–1.4)
EKG IMPRESSION: NORMAL
ERYTHROCYTE [DISTWIDTH] IN BLOOD BY AUTOMATED COUNT: 33.6 FL (ref 35.9–50)
EST. AVERAGE GLUCOSE BLD GHB EST-MCNC: 82 MG/DL
GLOBULIN SER CALC-MCNC: 3.2 G/DL (ref 1.9–3.5)
GLUCOSE SERPL-MCNC: 101 MG/DL (ref 65–99)
GLUCOSE SERPL-MCNC: 152 MG/DL (ref 65–99)
HBA1C MFR BLD: 4.5 % (ref 4–5.6)
HCT VFR BLD AUTO: 35.3 % (ref 37–47)
HDLC SERPL-MCNC: 38 MG/DL
HGB BLD-MCNC: 10.9 G/DL (ref 12–16)
LDLC SERPL CALC-MCNC: 51 MG/DL
LV EJECT FRACT  99904: 70
LV EJECT FRACT MOD 2C 99903: 58.13
LV EJECT FRACT MOD 4C 99902: 77.93
LV EJECT FRACT MOD BP 99901: 72.3
MAGNESIUM SERPL-MCNC: 2 MG/DL (ref 1.5–2.5)
MCH RBC QN AUTO: 20.5 PG (ref 27–33)
MCHC RBC AUTO-ENTMCNC: 30.9 G/DL (ref 33.6–35)
MCV RBC AUTO: 66.2 FL (ref 81.4–97.8)
METHADONE UR QL SCN: NEGATIVE
NT-PROBNP SERPL IA-MCNC: 303 PG/ML (ref 0–125)
OPIATES UR QL SCN: POSITIVE
OXYCODONE UR QL SCN: POSITIVE
PCP UR QL SCN: NEGATIVE
PLATELET # BLD AUTO: 225 K/UL (ref 164–446)
PMV BLD AUTO: 10 FL (ref 9–12.9)
POTASSIUM SERPL-SCNC: 4.2 MMOL/L (ref 3.6–5.5)
POTASSIUM SERPL-SCNC: 4.5 MMOL/L (ref 3.6–5.5)
PROPOXYPH UR QL SCN: NEGATIVE
PROT SERPL-MCNC: 7.4 G/DL (ref 6–8.2)
RBC # BLD AUTO: 5.33 M/UL (ref 4.2–5.4)
SARS-COV-2 RNA RESP QL NAA+PROBE: NOTDETECTED
SODIUM SERPL-SCNC: 134 MMOL/L (ref 135–145)
SODIUM SERPL-SCNC: 135 MMOL/L (ref 135–145)
SPECIMEN SOURCE: NORMAL
T4 FREE SERPL-MCNC: 1.71 NG/DL (ref 0.93–1.7)
TRIGL SERPL-MCNC: 130 MG/DL (ref 0–149)
TSH SERPL DL<=0.005 MIU/L-ACNC: 0.02 UIU/ML (ref 0.38–5.33)
WBC # BLD AUTO: 4.8 K/UL (ref 4.8–10.8)

## 2021-02-28 PROCEDURE — 770020 HCHG ROOM/CARE - TELE (206)

## 2021-02-28 PROCEDURE — A9270 NON-COVERED ITEM OR SERVICE: HCPCS | Performed by: STUDENT IN AN ORGANIZED HEALTH CARE EDUCATION/TRAINING PROGRAM

## 2021-02-28 PROCEDURE — 94640 AIRWAY INHALATION TREATMENT: CPT

## 2021-02-28 PROCEDURE — 80061 LIPID PANEL: CPT

## 2021-02-28 PROCEDURE — 71275 CT ANGIOGRAPHY CHEST: CPT

## 2021-02-28 PROCEDURE — 83880 ASSAY OF NATRIURETIC PEPTIDE: CPT

## 2021-02-28 PROCEDURE — 99233 SBSQ HOSP IP/OBS HIGH 50: CPT | Mod: GC | Performed by: INTERNAL MEDICINE

## 2021-02-28 PROCEDURE — 94760 N-INVAS EAR/PLS OXIMETRY 1: CPT

## 2021-02-28 PROCEDURE — 36415 COLL VENOUS BLD VENIPUNCTURE: CPT

## 2021-02-28 PROCEDURE — 83735 ASSAY OF MAGNESIUM: CPT

## 2021-02-28 PROCEDURE — 93005 ELECTROCARDIOGRAM TRACING: CPT | Performed by: STUDENT IN AN ORGANIZED HEALTH CARE EDUCATION/TRAINING PROGRAM

## 2021-02-28 PROCEDURE — 99406 BEHAV CHNG SMOKING 3-10 MIN: CPT

## 2021-02-28 PROCEDURE — 05HY33Z INSERTION OF INFUSION DEVICE INTO UPPER VEIN, PERCUTANEOUS APPROACH: ICD-10-PCS | Performed by: INTERNAL MEDICINE

## 2021-02-28 PROCEDURE — 80053 COMPREHEN METABOLIC PANEL: CPT

## 2021-02-28 PROCEDURE — 700117 HCHG RX CONTRAST REV CODE 255: Performed by: STUDENT IN AN ORGANIZED HEALTH CARE EDUCATION/TRAINING PROGRAM

## 2021-02-28 PROCEDURE — 85027 COMPLETE CBC AUTOMATED: CPT

## 2021-02-28 PROCEDURE — 700102 HCHG RX REV CODE 250 W/ 637 OVERRIDE(OP): Performed by: STUDENT IN AN ORGANIZED HEALTH CARE EDUCATION/TRAINING PROGRAM

## 2021-02-28 PROCEDURE — 76937 US GUIDE VASCULAR ACCESS: CPT

## 2021-02-28 PROCEDURE — 83036 HEMOGLOBIN GLYCOSYLATED A1C: CPT

## 2021-02-28 PROCEDURE — 94664 DEMO&/EVAL PT USE INHALER: CPT

## 2021-02-28 PROCEDURE — B54MZZA ULTRASONOGRAPHY OF RIGHT UPPER EXTREMITY VEINS, GUIDANCE: ICD-10-PCS | Performed by: INTERNAL MEDICINE

## 2021-02-28 PROCEDURE — 93306 TTE W/DOPPLER COMPLETE: CPT

## 2021-02-28 PROCEDURE — 700111 HCHG RX REV CODE 636 W/ 250 OVERRIDE (IP): Performed by: STUDENT IN AN ORGANIZED HEALTH CARE EDUCATION/TRAINING PROGRAM

## 2021-02-28 PROCEDURE — 93010 ELECTROCARDIOGRAM REPORT: CPT | Performed by: INTERNAL MEDICINE

## 2021-02-28 PROCEDURE — 93306 TTE W/DOPPLER COMPLETE: CPT | Mod: 26 | Performed by: INTERNAL MEDICINE

## 2021-02-28 PROCEDURE — 700101 HCHG RX REV CODE 250: Performed by: STUDENT IN AN ORGANIZED HEALTH CARE EDUCATION/TRAINING PROGRAM

## 2021-02-28 PROCEDURE — 80048 BASIC METABOLIC PNL TOTAL CA: CPT

## 2021-02-28 RX ORDER — CHOLECALCIFEROL (VITAMIN D3) 125 MCG
5 CAPSULE ORAL NIGHTLY
Status: DISCONTINUED | OUTPATIENT
Start: 2021-02-28 | End: 2021-03-01 | Stop reason: HOSPADM

## 2021-02-28 RX ORDER — DIPHENHYDRAMINE HCL 25 MG
25 TABLET ORAL NIGHTLY PRN
Status: DISCONTINUED | OUTPATIENT
Start: 2021-02-28 | End: 2021-03-01 | Stop reason: HOSPADM

## 2021-02-28 RX ORDER — OXYCODONE HYDROCHLORIDE AND ACETAMINOPHEN 5; 325 MG/1; MG/1
1 TABLET ORAL EVERY 6 HOURS PRN
Status: DISCONTINUED | OUTPATIENT
Start: 2021-02-28 | End: 2021-03-01 | Stop reason: HOSPADM

## 2021-02-28 RX ORDER — AZITHROMYCIN 250 MG/1
500 TABLET, FILM COATED ORAL DAILY
Status: COMPLETED | OUTPATIENT
Start: 2021-02-28 | End: 2021-03-01

## 2021-02-28 RX ORDER — ACETAMINOPHEN 500 MG
1000 TABLET ORAL DAILY
Status: DISCONTINUED | OUTPATIENT
Start: 2021-02-28 | End: 2021-03-01 | Stop reason: HOSPADM

## 2021-02-28 RX ORDER — LEVOTHYROXINE SODIUM 0.1 MG/1
100 TABLET ORAL
Status: DISCONTINUED | OUTPATIENT
Start: 2021-03-01 | End: 2021-03-01 | Stop reason: HOSPADM

## 2021-02-28 RX ADMIN — LEVOTHYROXINE SODIUM 125 MCG: 0.12 TABLET ORAL at 06:59

## 2021-02-28 RX ADMIN — GUAIFENESIN 600 MG: 600 TABLET, EXTENDED RELEASE ORAL at 17:32

## 2021-02-28 RX ADMIN — OXYCODONE HYDROCHLORIDE AND ACETAMINOPHEN 1 TABLET: 5; 325 TABLET ORAL at 19:36

## 2021-02-28 RX ADMIN — MORPHINE SULFATE 1 MG: 4 INJECTION INTRAVENOUS at 10:37

## 2021-02-28 RX ADMIN — DEXAMETHASONE 6 MG: 4 TABLET ORAL at 05:17

## 2021-02-28 RX ADMIN — OXYCODONE HYDROCHLORIDE AND ACETAMINOPHEN 1 TABLET: 5; 325 TABLET ORAL at 13:22

## 2021-02-28 RX ADMIN — ALPRAZOLAM 0.5 MG: 1 TABLET ORAL at 05:01

## 2021-02-28 RX ADMIN — KETOROLAC TROMETHAMINE 30 MG: 30 INJECTION, SOLUTION INTRAMUSCULAR; INTRAVENOUS at 22:11

## 2021-02-28 RX ADMIN — IPRATROPIUM BROMIDE AND ALBUTEROL SULFATE 3 ML: .5; 3 SOLUTION RESPIRATORY (INHALATION) at 07:01

## 2021-02-28 RX ADMIN — IOHEXOL 50 ML: 350 INJECTION, SOLUTION INTRAVENOUS at 16:21

## 2021-02-28 RX ADMIN — ASPIRIN 81 MG: 81 TABLET, COATED ORAL at 05:17

## 2021-02-28 RX ADMIN — HEPARIN SODIUM 5000 UNITS: 5000 INJECTION, SOLUTION INTRAVENOUS; SUBCUTANEOUS at 17:31

## 2021-02-28 RX ADMIN — ONDANSETRON 4 MG: 2 INJECTION INTRAMUSCULAR; INTRAVENOUS at 10:42

## 2021-02-28 RX ADMIN — ALPRAZOLAM 1 MG: 1 TABLET ORAL at 21:05

## 2021-02-28 RX ADMIN — KETOROLAC TROMETHAMINE 30 MG: 30 INJECTION, SOLUTION INTRAMUSCULAR; INTRAVENOUS at 15:30

## 2021-02-28 RX ADMIN — GUAIFENESIN 600 MG: 600 TABLET, EXTENDED RELEASE ORAL at 05:17

## 2021-02-28 RX ADMIN — GUAIFENESIN 600 MG: 600 TABLET, EXTENDED RELEASE ORAL at 01:17

## 2021-02-28 RX ADMIN — ATORVASTATIN CALCIUM 40 MG: 40 TABLET, FILM COATED ORAL at 17:31

## 2021-02-28 RX ADMIN — AZITHROMYCIN MONOHYDRATE 500 MG: 250 TABLET ORAL at 13:22

## 2021-02-28 RX ADMIN — DOCUSATE SODIUM 50 MG AND SENNOSIDES 8.6 MG 2 TABLET: 8.6; 5 TABLET, FILM COATED ORAL at 17:32

## 2021-02-28 RX ADMIN — HEPARIN SODIUM 5000 UNITS: 5000 INJECTION, SOLUTION INTRAVENOUS; SUBCUTANEOUS at 01:18

## 2021-02-28 RX ADMIN — HEPARIN SODIUM 5000 UNITS: 5000 INJECTION, SOLUTION INTRAVENOUS; SUBCUTANEOUS at 08:45

## 2021-02-28 ASSESSMENT — LIFESTYLE VARIABLES
ALCOHOL_USE: NO
TOTAL SCORE: 0
EVER FELT BAD OR GUILTY ABOUT YOUR DRINKING: NO
HOW MANY TIMES IN THE PAST YEAR HAVE YOU HAD 5 OR MORE DRINKS IN A DAY: 0
CONSUMPTION TOTAL: NEGATIVE
DOES PATIENT WANT TO STOP DRINKING: NO
TOTAL SCORE: 0
EVER HAD A DRINK FIRST THING IN THE MORNING TO STEADY YOUR NERVES TO GET RID OF A HANGOVER: NO
TOTAL SCORE: 0
HAVE PEOPLE ANNOYED YOU BY CRITICIZING YOUR DRINKING: NO
ON A TYPICAL DAY WHEN YOU DRINK ALCOHOL HOW MANY DRINKS DO YOU HAVE: 0
HAVE YOU EVER FELT YOU SHOULD CUT DOWN ON YOUR DRINKING: NO
AVERAGE NUMBER OF DAYS PER WEEK YOU HAVE A DRINK CONTAINING ALCOHOL: 0
PACK_YEARS: 30

## 2021-02-28 ASSESSMENT — COGNITIVE AND FUNCTIONAL STATUS - GENERAL
DAILY ACTIVITIY SCORE: 24
SUGGESTED CMS G CODE MODIFIER MOBILITY: CH
SUGGESTED CMS G CODE MODIFIER DAILY ACTIVITY: CH
MOBILITY SCORE: 24

## 2021-02-28 ASSESSMENT — ENCOUNTER SYMPTOMS
VOMITING: 0
COUGH: 1
HEADACHES: 0
FEVER: 1
DOUBLE VISION: 0
DIZZINESS: 0
BLURRED VISION: 0
NAUSEA: 0
MYALGIAS: 1
SHORTNESS OF BREATH: 1
CHILLS: 0
PALPITATIONS: 0
WHEEZING: 1

## 2021-02-28 ASSESSMENT — PAIN DESCRIPTION - PAIN TYPE
TYPE: ACUTE PAIN

## 2021-02-28 ASSESSMENT — PAIN SCALES - WONG BAKER: WONGBAKER_NUMERICALRESPONSE: HURTS A LITTLE MORE

## 2021-02-28 ASSESSMENT — PATIENT HEALTH QUESTIONNAIRE - PHQ9
SUM OF ALL RESPONSES TO PHQ9 QUESTIONS 1 AND 2: 0
2. FEELING DOWN, DEPRESSED, IRRITABLE, OR HOPELESS: NOT AT ALL
1. LITTLE INTEREST OR PLEASURE IN DOING THINGS: NOT AT ALL

## 2021-02-28 ASSESSMENT — FIBROSIS 4 INDEX: FIB4 SCORE: 0.75

## 2021-02-28 NOTE — PROGRESS NOTES
2 RN skin check complete with RN Maryann.  Devices in place: Tele monitor and Nasal Cannula.  Skin assess under devices: Intact.  Confirmed pressure ulcers found on: None.  New potential pressure ulcers noted on: None.   Wound consult placed and wound reported: n/a.    Skin issues are as follows: Old scar left lower leg with chronic swelling,Generalized old scars. All bony prominences intact.    The following interventions in place: Extra pillows for support, encourage patient to turn frequently.
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
## 2021-02-28 NOTE — ASSESSMENT & PLAN NOTE
RESOLVED Acute on chronic respiratory failure in setting of COPD, initially require 1-3 L supplemental O2, IMPROVED  Differential include but not limited to COPD exacerbation, PE, PNA, heart failure, asthma  Requiring 4-6 L oxygen on admission   Dimer was mildly elevated, CTPE negative for PE  Troponin was negative, however, EKG  patient did show T wave inversion AVR, V1, V2  CXR Multifocal patchy infiltrates, no edema  BNP slightly elevated in 100's.  S/p Azithromycin for anti-inflammatory affect and possible atypical pneumonia  PLAN:  Steroid taper for possible interstitial lung disease, will continue decadron 4mg daily x 1 week followed by Decadron 2 mg daily x1 week  Continue albuterol prn and Albuterol neb prn  spiriva once daily prescribed  outpatient PFTs  Outpatient pulmonology consulted

## 2021-02-28 NOTE — CARE PLAN
Problem: Communication  Goal: The ability to communicate needs accurately and effectively will improve  Outcome: PROGRESSING AS EXPECTED  Note: Pt stated she had a rough night last night and explained it was because no PIV access could be established to do her CT or give her any IV pain medications. Pt reports 9/10 spinal pain from low back up to cervical spine, although appears w/o distress during conversation - pt states she believes its 2/2 position from freq coughing. This RN explained pain meds have to be used cautiously when she has a low HR as she was bradycardic overnight as well - pt states understanding. Hr in 60s now and awaiting possible PICC line for stronger pain meds and CT - MD informed of pt concerns     Problem: Safety  Goal: Will remain free from injury  Outcome: PROGRESSING AS EXPECTED  Note: AAOx4, VSS - can get thanh to 40s, independent OOB  Bed low/locked, non-skid socks on, call bell in reach     Problem: Infection  Goal: Will remain free from infection  Outcome: PROGRESSING AS EXPECTED  Note: CXR = multifocal PNA - zithromax qd added this afternoon  Afebrile and WBC = WNL     Problem: Venous Thromboembolism (VTW)/Deep Vein Thrombosis (DVT) Prevention:  Goal: Patient will participate in Venous Thrombosis (VTE)/Deep Vein Thrombosis (DVT)Prevention Measures  Outcome: PROGRESSING AS EXPECTED  Flowsheets (Taken 2/28/2021 0837)  SCDs, Sequential Compression Device: Refused  Pharmacologic Prophylaxis Used: Unfractionated Heparin  Note: D-dimer = 0.85  +pleuritic CP  Awaiting possible CTPE - need IV access     Problem: Discharge Barriers/Planning  Goal: Patient's continuum of care needs will be met  Outcome: PROGRESSING AS EXPECTED  Note: Txing for COPD exac  Awaiting ?PICC sine very poor IV access - per report, pt needs CTPE     Problem: Psychosocial Needs:  Goal: Level of anxiety will decrease  Outcome: PROGRESSING AS EXPECTED  Note: Multiple drugs on Utox - pt says she only uses marijuana and  that the benzos, opiates, and oxy must be from meds on recent admission. Discussed avoiding smoking marijuana 2/2 COPD exacerbation

## 2021-02-28 NOTE — PROGRESS NOTES
Pt. Asking for pain medication, Per Dr. Brown can give her 5mg oxycodone PO and tylenol. Pt.'s HR has been sustaining in the 40's.  However, Dr. Brown wants RT to come and administer a Duoneb treatment to help increase her heart rate before the pain medication is given.  RT was contacted to administer DuoNeb Tx.

## 2021-02-28 NOTE — PROGRESS NOTES
Assumed care of patient and bedside report received from Soledad AVILA RN. Patient educated on importance of calling, bed controls on, bed locked and in lowest position, call light with patient. Appropriate fall precautions in place. Belongs and bedside table in reach. No needs at this time.

## 2021-02-28 NOTE — PROGRESS NOTES
Both of the patient's IV infiltrated. Pt has no IV access, Dr. Brown aware. PICC line insertion ordered.

## 2021-02-28 NOTE — PROGRESS NOTES
Tested both the patients IV's and both were not working. Did a test injection on the left bicep iv and it is also infiltrated. Patient sent back to the floor. NICK Holly informed. Patient tearful but understanding.

## 2021-02-28 NOTE — PROCEDURES
Vascular Access Team    Date of Insertion: 2/28/2021  Arm Circumference: n/a  Line Length: 10cm  Line Size: 18g  Vein Occupancy %: 29  Reason for Midline: access  Labs: WBC 4.8, , BUN 17, Cr 0.62, GFR >60, INR n/a    Orders confirmed, vessel patency confirmed with ultrasound. Risks and benefits of procedure explained to patient and education regarding line associated bloodstream infections provided. Questions answered.     PowerGlide Midline placed in RUE per licensed provider order with ultrasound guidance. 18g, 10 cm line placed in brachial vein after 1 attempt(s).  Catheter inserted with brisk blood return. Secured with 0cm external from insertion site.  Line flushed without resistance with 10 mL 0.9% normal saline.  Midline secured with Biopatch and Tegaderm.     Midline placement is confirmed by nurse using ultrasound and ability to flush and draw blood. Midline is appropriate for use at this time.  No X-ray is needed for placement confirmation. Pt tolerated procedure well.  Patient condition relayed to unit RN or ordering physician via this post procedure note in the EMR.    Ultrasound images uploaded to PACS and viewable in the EMR - yes  Ultrasound imaged printed and placed in paper chart - no      BARD PowerGlide Midline ref # [id number], Lot # [id number], Expiration Date 9/30/2021

## 2021-02-28 NOTE — ASSESSMENT & PLAN NOTE
"Much improved.  COPD exacerbation, initally required oxygen supplementation and respiratory therapy.  Also possible that she has interstitial lung disease given that she had mild groundglass opacities in CT chest from 2 years ago and that she has more significant and diffuse groundglass opacities in this admission's CT chest  Given Decadron in ED  Plan  Continue Home meds  Continue Steroids.  See also plan in \"acute on chronic respiratory failure with hypoxia\"  "

## 2021-02-28 NOTE — ASSESSMENT & PLAN NOTE
Resolved.  Patient describe a 4-5 day hx of chest heaviness, relating to onset of his COPD.   Chest pain etiology unclear, differential includes COPD, cardiac causes;   Troponin in past admission and this admission are negative  Review of EKG from 2/24 showed ST depression and there is some T wave inversion  Toradol was ordered for pleuritic chest pain  Prior stress test in 2018 which was within normal limits  CT PE negative for PE and TTE normal.  PLAN:  -Consider seeing cardiology outpatient if chest pain should recur

## 2021-02-28 NOTE — DISCHARGE SUMMARY
Discharge Summary    Date of Admission: 2/27/2021  Date of Discharge: 3/1/2021 12:40 PM  Discharging Attending: Dg JIMENEZ   Discharging Senior Resident: Ninfa JIMENEZ  Discharging Intern: Lung MD    CHIEF COMPLAINT ON ADMISSION  Chief Complaint   Patient presents with   • Pneumonia     Pt BIB EMS for fever, SOB, and nausea. Pt was previously diagnosed with pneumonia on wednesday and admitted to hospital. Pt was discharged last night from hospital and encouraged to return to ER for persistent desaturation noted by pt's home health aid.       Reason for Admission  SOB    Admission Date  2/27/2021    CODE STATUS  Full Code    HPI & HOSPITAL COURSE  This is a 45 y.o. female with self report history of Grave disease (reported untreated and on levothyroxine), documented history of COPD ( no prior PFT), recent hospitalization for possible COPD exacerbation where patient left AMA who returns to [facility] 2/27/20 admitted for acute respiratory failure 2/2 COPD exacerbation, and ACS r/o    Patient was admitted 2/24-2/26, during that admission she had increase oxygen requirement and was admitted for acute respiratory failure with hypoxia due to presume pneumonia. She was started on RT protocol, duoneb, and supplemental O2. Patient left AMA and report her oxygen concentration at home was broken. She report continued shortness of breath and chest pressure and decide to return to [facility] for treatment. .     On presentation (2/27/20), she was noted to be very SCHULZ, but hemodynamically stable. She was treated with albuterol, duoneb and given course of methylprednisone with drastic improvement. CTPE was negative. Work up for infective process have been negative, included normal WBC, normal procal, no fevers. She was given short course of azithromycin to cover atypical pneumonia and for anti-inflammatory properties.     Patient also complain of constant chest pressure that started since her breathing symptoms start 5 days ago. She report good  exercise tolerance walking 2 miles a day without issue. Looking at her EKG on 02/24/21 there were some concern for possible ST depression and EKG on 02/27/21 also shows T wave inversion and flattenings ( which are new). A decision was made to evaluate cardiac function via echocardiogram and consider cardiac stress test if abnormal. Echocardiogram was performed showed cardiac function unchanged from 2018. At day of discharge patient report her chest pressure symptom has improve/ resolved since her breathing has improved.     Of note UDS 02/25, 2/27 was positive for benzodiazepine and oxycodone and opiates, and cannabinoid.  aware was verified and patient is receiving prescription of alprazolam 1mg TID PRN. It is unclear why patient tested positive for oxy, opiate.     # COPD ( no PFT on file)  # Acute Respiratory Failure with hypoxia  - While patient was admitted and treated for COPD with significant improvement. Her CTA Chest ( to r/o PE) showed bilateral ground glass opacity in upper lobe concerning infective vs inflammatory process. This finding is also present on her old CT in 08/9/2019 when she was admitted for SOB, SCHULZ.   - We recommended patient to follow up with pulmonologist for review of her CT and evaluation of her SOB and or PFT    # ?Graves disease ( reportedly untreated, on levothyroxine)  - report she was diagnosed and given course of steroidsand was placed on levothyroxine 125mcg  - Inpatient work up showed TSH was low 0.20, fT4 was 1.7 (high), suggested supratherapeutic dose of levothyroxine  - levothyroxine dose was decreased to 75mcg ( from 125mcg), patient was recommended to follow up with her PCP     # Chest pain  - Given chronicity and description of her chest pain, this is likely atypical chest pain or noncardiac chest pain related to her symptoms of COPD.   - Work up this admission: echocardiogram showed LVEF 70% and not significantly changed 2018  - prior work up includes a normal MPI  11/5/2018  - Patient is already on ASA and Atorvastatin for possible CAD  - If her chest pain reoccurs would recommend out patient follow up    At time of discharge, patient was seen ambulating down the [place] at brisk pace without requiring oxygen.        Therefore, she is discharged in good and stable condition to home with close outpatient follow-up.    The patient met 2-midnight criteria for an inpatient stay at the time of discharge.    PHYSICAL EXAM ON DISCHARGE  Temp:  [35.7 °C (96.2 °F)-36.1 °C (96.9 °F)] 35.7 °C (96.2 °F)  Pulse:  [53-68] 62  Resp:  [16-18] 18  BP: ()/(46-67) 99/60  SpO2:  [96 %-97 %] 96 %    Physical Exam  Constitutional:       Appearance: Normal appearance.   HENT:      Head: Normocephalic and atraumatic.      Mouth/Throat:      Mouth: Mucous membranes are moist.   Eyes:      General: No scleral icterus.     Conjunctiva/sclera: Conjunctivae normal.   Cardiovascular:      Rate and Rhythm: Normal rate and regular rhythm.   Pulmonary:      Effort: Pulmonary effort is normal.      Breath sounds: Wheezing present. No rales.   Abdominal:      General: Abdomen is flat. Bowel sounds are normal.      Palpations: Abdomen is soft.      Tenderness: There is no abdominal tenderness.   Musculoskeletal:      Cervical back: Normal range of motion.   Skin:     General: Skin is warm and dry.   Neurological:      General: No focal deficit present.      Mental Status: She is alert and oriented to person, place, and time. Mental status is at baseline.   Psychiatric:         Mood and Affect: Mood normal.         Behavior: Behavior normal.         Discharge Date  3/1/2021    FOLLOW UP ITEMS POST DISCHARGE  - Follow up with PCP for post hospital discharge, PFT for work up COPD, and further evaluation of ?Graves disease/ hypothyroidism ( dosing of levothyroxin)  - Follow up with outpatient pulmonologist for evaluation of inflammatory causes of SOB vs PFT    DISCHARGE DIAGNOSES  Principal Problem:    Acute on  chronic respiratory failure with hypoxia (HCC) POA: Yes  Active Problems:    Anxiety POA: Yes    Chest pain POA: Yes    COPD with acute exacerbation (HCC) POA: Yes    Graves disease POA: Yes    Cannabis use disorder, mild, abuse POA: Yes    Long-term current use of benzodiazepine POA: Unknown    Anemia POA: Yes  Resolved Problems:    * No resolved hospital problems. *      FOLLOW UP  No future appointments.  [address]  [place] [address] [phone]  Schedule an appointment as soon as possible for a visit        MEDICATIONS ON DISCHARGE     Medication List      START taking these medications      Instructions   atorvastatin 40 MG Tabs  Commonly known as: LIPITOR   Take 1 tablet by mouth every evening.  Dose: 40 mg     * dexamethasone 4 MG Tabs  Start taking on: March 2, 2021  Commonly known as: DECADRON   Doctor's comments: Take dexamethasone 4mg daily for 1 week, then after that take dexamethasone 2mg daily for 1 week.  Take 1 tab daily for 1 week, then after that take 1/2 tab daily for 1 week.  Dose: 4 mg     * dexamethasone 2 MG tablet  Start taking on: March 10, 2021  Commonly known as: DECADRON   Take 1 tablet by mouth every day for 6 days.  Dose: 2 mg     melatonin 5 mg Tabs   Take 1 tablet by mouth every day.  Dose: 5 mg     Spiriva HandiHaler 18 MCG Caps  Generic drug: tiotropium   Place 1 capsule into inhaler and inhale every day.  Dose: 18 mcg         * This list has 2 medication(s) that are the same as other medications prescribed for you. Read the directions carefully, and ask your doctor or other care provider to review them with you.            CHANGE how you take these medications      Instructions   levothyroxine 75 MCG Tabs  What changed:   · medication strength  · how much to take  Commonly known as: SYNTHROID   Take 1 tablet by mouth Every morning on an empty stomach.  Dose: 75 mcg        CONTINUE taking these medications      Instructions   *  acetaminophen 500 MG Tabs  Commonly known as: TYLENOL   Take 1,000 mg by mouth every 6 hours as needed.  Dose: 1,000 mg     * acetaminophen 325 MG Tabs  Commonly known as: Tylenol   Take 2 Tabs by mouth every 6 hours as needed (Mild Pain; (Pain scale 1-3); Temp greater than 100.5 F).  Dose: 650 mg     * albuterol 108 (90 Base) MCG/ACT Aers inhalation aerosol  Commonly known as: Ventolin HFA   Inhale 2 Puffs every four hours as needed for Shortness of Breath.  Dose: 2 Puff     * albuterol 2.5mg/3ml Nebu solution for nebulization  Commonly known as: PROVENTIL   Take 3 mL by nebulization every four hours as needed for Shortness of Breath. Use as needed  Dose: 2.5 mg     ALPRAZolam 1 MG Tabs  Commonly known as: XANAX   Take 1 mg by mouth 3 times a day as needed for Anxiety. Indications: Feeling Anxious, Trouble Sleeping  Dose: 1 mg     aspirin EC 81 MG Tbec  Commonly known as: ECOTRIN   Take 1 tablet by mouth every day.  Dose: 81 mg     DAYQUIL PO   Take 2 Capsules by mouth every four hours as needed (allergy).  Dose: 2 capsule     guaiFENesin  MG Tb12  Commonly known as: MUCINEX   Take 1 tablet by mouth every 12 hours.  Dose: 600 mg     ibuprofen 200 MG Tabs  Commonly known as: MOTRIN   Take 800 mg by mouth every 8 hours as needed for Mild Pain or Headache.  Dose: 800 mg     ondansetron 4 MG Tbdp  Commonly known as: ZOFRAN ODT   Take 4 mg by mouth every 6 hours as needed for Nausea.  Dose: 4 mg         * This list has 4 medication(s) that are the same as other medications prescribed for you. Read the directions carefully, and ask your doctor or other care provider to review them with you.            STOP taking these medications    potassium Chloride ER 20 MEQ Tbcr tablet  Commonly known as: K-TAB     predniSONE 20 MG Tabs  Commonly known as: DELTASONE            Allergies  Allergies   Allergen Reactions   • Carbamazepine Rash and Swelling     .   • Cephalexin Rash and Swelling     Full body sweeling and rash     • Mushroom Extract Complex Swelling   • Penicillins Anaphylaxis     Reported that her throat closes   • Phenytoin Swelling     Swelling   • Rondec Rash and Swelling     .   • Sulfa Drugs Anaphylaxis     Reported that her throat closes   • Cefadroxil Unspecified     Pt is not sure, but knows that she has an allergy to this medication.       DIET  Orders Placed This Encounter   Procedures   • Diet Order Diet: Cardiac     Standing Status:   Standing     Number of Occurrences:   1     Order Specific Question:   Diet:     Answer:   Cardiac [6]       ACTIVITY  As tolerated.  Weight bearing as tolerated    CONSULTATIONS  NA    PROCEDURES  NA
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
## 2021-02-28 NOTE — PROGRESS NOTES
Home health agency for pt called to state they delivered O2 compressor for pt on Friday. This RN informed agency that the compressor apparently wasn't working.     Home health agency spoke with pt at this time and agrees to exchange the compressor out around noon today while pt  home to accept the exchange.

## 2021-02-28 NOTE — CARE PLAN
Problem: Safety  Goal: Will remain free from falls  Outcome: PROGRESSING AS EXPECTED  Intervention: Implement fall precautions  Flowsheets (Taken 2/28/2021 0055)  Environmental Precautions:   Treaded Slipper Socks on Patient   Personal Belongings, Wastebasket, Call Bell etc. in Easy Reach   Transferred to Stronger Side   Report Given to Other Health Care Providers Regarding Fall Risk   Bed in Low Position   Communication Sign for Patients & Families   Mobility Assessed & Appropriate Sign Placed     Problem: Infection  Goal: Will remain free from infection  Outcome: PROGRESSING AS EXPECTED  Intervention: Implement standard precautions and perform hand washing before and after patient contact  Note: Hand hygiene performed pre and post assessment

## 2021-02-28 NOTE — PROGRESS NOTES
"Pt c/o chest pain \"9\" 0-10. Stat EKG ordered and Dr. Brown notified. Dr. Brown stated he will come to bedside. Rapid response RN here at bedside.  "

## 2021-02-28 NOTE — RESPIRATORY CARE
"COPD EDUCATION by COPD CLINICAL EDUCATOR  2/28/2021  at  9:52 AM by Dalia Pavon, RRT     Patient interviewed by COPD education team.  Patient unable to participate in full program.  Short intervention has been conducted.  A comprehensive packet including information about COPD, treatments, and smoking cessation provided and reviewed at bedside. Encouraged asking PCP for PFT studies. She states she quit smoking cigarettes x 1 week. Admits to Ecig/vapes and Marihuana inhaled. Smoking Cessation Intervention and education completed, 10 minutes spent on smoking cessation education with patient.    COPD Assessment  COPD Clinical Specialists ONLY  COPD Education Initiated: Yes--Short Intervention  DQ Reason: readmit (left AMA); has Albuterol at home and new nebulizer; O2 concentrator being exchanged today per notes/calls with [facility].   Patient gives variable history.; quit smoking x 1 week admitss to other Ecigs/MJ-cessation discussed.Spacer with demo given  Physician Follow Up Appointment: (declined when asked)  Pulmonary Follow Up Appointment: (declined assistance/reluctant to commit)  Referrals Initiated: Initiated  Smoking Cessation: Yes  $ Smoking Cessation 3-10 Minutes: Symptomatic  Smoking Pack Years: 30  Private In-Home Care Agency: (states she has a health aide at home)  Is this a COPD exacerbation patient?: Yes(Not clarified with admit here prior but left AMA)  $ Demo/Eval of SVN's, MDI's and Aerosols: Yes     MY COPD ACTION PLAN     It is recommended that patients and physicians /healthcare providers complete this action plan together. This plan should be discussed at each physician visit and updated as needed.    The green, yellow and red zones show groups of symptoms of COPD. This list of symptoms is not comprehensive, and you may experience other symptoms. In the \"Actions\" column, your healthcare provider has recommended actions for you to take based on your symptoms.    Patient Name: Madelyn Breaux " "Clarisse   YOB: 1975   Last Updated on:     Green Zone:  I am doing well today Actions   •  Usual activitiy and exercise level •  Take daily medications   •  Usual amounts of cough and phlegm/mucus •  Use oxygen as prescribed   •  Sleep well at night •  Continue regular exercise/diet plan   •  Appetite is good •  At all times avoid cigarette smoke, inhaled irritants     Daily Medications (these medications are taken every day):                Yellow Zone:  I am having a bad day or a COPD flare Actions   •  More breathless than usual •  Continue daily medications   •  I have less energy for my daily activities •  Use quick relief inhaler as ordered   •  Increased or thicker phlegm/mucus •  Use oxygen as prescribed   •  Using quick relief inhaler/nebulizer more often •  Get plenty of rest   •  Swelling of ankles more than usual •  Use pursed lip breathing   •  More coughing than usual •  At all times avoid cigarette smoke, inhaled irritants   •  I feel like I have a \"chest cold\"   •  Poor sleep and my symptoms woke me up   •  My appetite is not good   •  My medicine is not helping    •  Call provider immediately if symptoms don’t improve     Continue daily medications, add rescue medications:   Albuterol  Albuterol 2.5mg via nebulizer  2 Puffs Every 4 hours PRN  Every 4 hours PRN       Medications to be used during a flare up, (as Discussed with Provider):           Additional Information:  Clean nebulizer after use per manufacturers recommendation and use albuterol inhaler with spacer    Red Zone:  I need urgent medical care Actions   •  Severe shortness of breath even at rest •  Call 911 or seek medical care immediately   •  Not able to do any activity because of breathing    •  Fever or shaking chills    •  Feeling confused or very drowsy     •  Chest pains    •  Coughing up blood              "

## 2021-02-28 NOTE — ASSESSMENT & PLAN NOTE
TSH 0.020, free T 4 1.71  Patient home medication levothyroxin 125mcg  PLAN:  -Patient appears to be on weight base dosing, however given lab suggestive of over treating will decrease dose from 125mcg to 75mcg. (Dose by weight 1.6mcg x 70 = 112 mcg )  -Follow up outpatient PCP

## 2021-02-28 NOTE — ED TRIAGE NOTES
Chief Complaint   Patient presents with   • Pneumonia     Pt BIB EMS for fever, SOB, and nausea. Pt was previously diagnosed with pneumonia on wednesday and admitted to hospital. Pt was discharged last night from hospital and encouraged to return to ER for persistent desaturation noted by pt's home health aid.

## 2021-02-28 NOTE — ASSESSMENT & PLAN NOTE
positive for cannabinoid on the 25th as well as benzodiazepines and oxycodone and opiates.  Counseled on decreasing use

## 2021-03-01 ENCOUNTER — TELEPHONE (OUTPATIENT)
Dept: SLEEP MEDICINE | Facility: MEDICAL CENTER | Age: 46
End: 2021-03-01

## 2021-03-01 VITALS
HEART RATE: 62 BPM | DIASTOLIC BLOOD PRESSURE: 60 MMHG | OXYGEN SATURATION: 96 % | SYSTOLIC BLOOD PRESSURE: 99 MMHG | TEMPERATURE: 96.2 F | HEIGHT: 68 IN | RESPIRATION RATE: 18 BRPM | BODY MASS INDEX: 24.36 KG/M2 | WEIGHT: 160.72 LBS

## 2021-03-01 LAB
ANION GAP SERPL CALC-SCNC: 10 MMOL/L (ref 7–16)
BACTERIA BLD CULT: NORMAL
BACTERIA BLD CULT: NORMAL
BASOPHILS # BLD AUTO: 0.1 % (ref 0–1.8)
BASOPHILS # BLD: 0.01 K/UL (ref 0–0.12)
BUN SERPL-MCNC: 20 MG/DL (ref 8–22)
CALCIUM SERPL-MCNC: 8.6 MG/DL (ref 8.5–10.5)
CHLORIDE SERPL-SCNC: 101 MMOL/L (ref 96–112)
CO2 SERPL-SCNC: 25 MMOL/L (ref 20–33)
CREAT SERPL-MCNC: 0.61 MG/DL (ref 0.5–1.4)
EOSINOPHIL # BLD AUTO: 0.09 K/UL (ref 0–0.51)
EOSINOPHIL NFR BLD: 1.2 % (ref 0–6.9)
ERYTHROCYTE [DISTWIDTH] IN BLOOD BY AUTOMATED COUNT: 33.2 FL (ref 35.9–50)
GLUCOSE SERPL-MCNC: 102 MG/DL (ref 65–99)
HCT VFR BLD AUTO: 26.5 % (ref 37–47)
HGB BLD-MCNC: 8.5 G/DL (ref 12–16)
IMM GRANULOCYTES # BLD AUTO: 0.12 K/UL (ref 0–0.11)
IMM GRANULOCYTES NFR BLD AUTO: 1.6 % (ref 0–0.9)
LYMPHOCYTES # BLD AUTO: 3.43 K/UL (ref 1–4.8)
LYMPHOCYTES NFR BLD: 45.3 % (ref 22–41)
MCH RBC QN AUTO: 20.6 PG (ref 27–33)
MCHC RBC AUTO-ENTMCNC: 31.5 G/DL (ref 33.6–35)
MCV RBC AUTO: 65.4 FL (ref 81.4–97.8)
MONOCYTES # BLD AUTO: 0.46 K/UL (ref 0–0.85)
MONOCYTES NFR BLD AUTO: 6.1 % (ref 0–13.4)
NEUTROPHILS # BLD AUTO: 3.47 K/UL (ref 2–7.15)
NEUTROPHILS NFR BLD: 45.7 % (ref 44–72)
NRBC # BLD AUTO: 0.02 K/UL
NRBC BLD-RTO: 0.3 /100 WBC
PLATELET # BLD AUTO: 271 K/UL (ref 164–446)
PMV BLD AUTO: 9.9 FL (ref 9–12.9)
POTASSIUM SERPL-SCNC: 3.3 MMOL/L (ref 3.6–5.5)
RBC # BLD AUTO: 4.08 M/UL (ref 4.2–5.4)
SIGNIFICANT IND 70042: NORMAL
SIGNIFICANT IND 70042: NORMAL
SITE SITE: NORMAL
SITE SITE: NORMAL
SODIUM SERPL-SCNC: 136 MMOL/L (ref 135–145)
SOURCE SOURCE: NORMAL
SOURCE SOURCE: NORMAL
WBC # BLD AUTO: 7.6 K/UL (ref 4.8–10.8)

## 2021-03-01 PROCEDURE — 90686 IIV4 VACC NO PRSV 0.5 ML IM: CPT | Performed by: INTERNAL MEDICINE

## 2021-03-01 PROCEDURE — A9270 NON-COVERED ITEM OR SERVICE: HCPCS | Performed by: STUDENT IN AN ORGANIZED HEALTH CARE EDUCATION/TRAINING PROGRAM

## 2021-03-01 PROCEDURE — 700111 HCHG RX REV CODE 636 W/ 250 OVERRIDE (IP): Performed by: INTERNAL MEDICINE

## 2021-03-01 PROCEDURE — 3E02340 INTRODUCTION OF INFLUENZA VACCINE INTO MUSCLE, PERCUTANEOUS APPROACH: ICD-10-PCS | Performed by: INTERNAL MEDICINE

## 2021-03-01 PROCEDURE — 80048 BASIC METABOLIC PNL TOTAL CA: CPT

## 2021-03-01 PROCEDURE — 700102 HCHG RX REV CODE 250 W/ 637 OVERRIDE(OP): Performed by: STUDENT IN AN ORGANIZED HEALTH CARE EDUCATION/TRAINING PROGRAM

## 2021-03-01 PROCEDURE — 85025 COMPLETE CBC W/AUTO DIFF WBC: CPT

## 2021-03-01 PROCEDURE — 90471 IMMUNIZATION ADMIN: CPT

## 2021-03-01 PROCEDURE — 94669 MECHANICAL CHEST WALL OSCILL: CPT

## 2021-03-01 PROCEDURE — 99239 HOSP IP/OBS DSCHRG MGMT >30: CPT | Mod: GC | Performed by: INTERNAL MEDICINE

## 2021-03-01 PROCEDURE — 94760 N-INVAS EAR/PLS OXIMETRY 1: CPT

## 2021-03-01 PROCEDURE — 700111 HCHG RX REV CODE 636 W/ 250 OVERRIDE (IP): Performed by: STUDENT IN AN ORGANIZED HEALTH CARE EDUCATION/TRAINING PROGRAM

## 2021-03-01 RX ORDER — POTASSIUM CHLORIDE 20 MEQ/1
40 TABLET, EXTENDED RELEASE ORAL ONCE
Status: COMPLETED | OUTPATIENT
Start: 2021-03-01 | End: 2021-03-01

## 2021-03-01 RX ORDER — DEXAMETHASONE 0.5 MG/1
0.5 TABLET ORAL 2 TIMES DAILY
Refills: 0 | Status: CANCELLED | OUTPATIENT
Start: 2021-03-01

## 2021-03-01 RX ORDER — TIOTROPIUM BROMIDE 18 UG/1
18 CAPSULE ORAL; RESPIRATORY (INHALATION) DAILY
Qty: 30 CAPSULE | Refills: 1 | Status: SHIPPED | OUTPATIENT
Start: 2021-03-01 | End: 2024-02-15

## 2021-03-01 RX ORDER — ATORVASTATIN CALCIUM 40 MG/1
40 TABLET, FILM COATED ORAL EVERY EVENING
Qty: 30 TABLET | Refills: 1 | Status: SHIPPED | OUTPATIENT
Start: 2021-03-01 | End: 2024-02-15

## 2021-03-01 RX ORDER — LEVOTHYROXINE SODIUM 0.07 MG/1
75 TABLET ORAL
Qty: 30 TABLET | Refills: 1 | Status: SHIPPED | OUTPATIENT
Start: 2021-03-01 | End: 2024-02-15

## 2021-03-01 RX ORDER — DEXAMETHASONE 2 MG/1
2 TABLET ORAL DAILY
Qty: 6 TABLET | Refills: 0 | Status: SHIPPED | OUTPATIENT
Start: 2021-03-10 | End: 2021-03-16

## 2021-03-01 RX ORDER — DEXAMETHASONE 4 MG/1
4 TABLET ORAL DAILY
Qty: 7 TABLET | Refills: 0 | Status: SHIPPED | OUTPATIENT
Start: 2021-03-02 | End: 2021-03-09

## 2021-03-01 RX ORDER — CHOLECALCIFEROL (VITAMIN D3) 125 MCG
5 CAPSULE ORAL DAILY
Qty: 30 TABLET | Refills: 1 | Status: SHIPPED | OUTPATIENT
Start: 2021-03-01 | End: 2024-02-15

## 2021-03-01 RX ORDER — IPRATROPIUM BROMIDE AND ALBUTEROL SULFATE 2.5; .5 MG/3ML; MG/3ML
3 SOLUTION RESPIRATORY (INHALATION)
Status: DISCONTINUED | OUTPATIENT
Start: 2021-03-01 | End: 2021-03-01 | Stop reason: HOSPADM

## 2021-03-01 RX ADMIN — GUAIFENESIN 600 MG: 600 TABLET, EXTENDED RELEASE ORAL at 04:56

## 2021-03-01 RX ADMIN — AZITHROMYCIN MONOHYDRATE 500 MG: 250 TABLET ORAL at 04:56

## 2021-03-01 RX ADMIN — ACETAMINOPHEN 1000 MG: 500 TABLET ORAL at 04:56

## 2021-03-01 RX ADMIN — POTASSIUM CHLORIDE 40 MEQ: 1500 TABLET, EXTENDED RELEASE ORAL at 06:31

## 2021-03-01 RX ADMIN — DEXAMETHASONE 6 MG: 4 TABLET ORAL at 04:56

## 2021-03-01 RX ADMIN — HEPARIN SODIUM 5000 UNITS: 5000 INJECTION, SOLUTION INTRAVENOUS; SUBCUTANEOUS at 07:36

## 2021-03-01 RX ADMIN — ASPIRIN 81 MG: 81 TABLET, COATED ORAL at 04:56

## 2021-03-01 RX ADMIN — INFLUENZA A VIRUS A/GUANGDONG-MAONAN/SWL1536/2019 CNIC-1909 (H1N1) ANTIGEN (FORMALDEHYDE INACTIVATED), INFLUENZA A VIRUS A/HONG KONG/2671/2019 (H3N2) ANTIGEN (FORMALDEHYDE INACTIVATED), INFLUENZA B VIRUS B/PHUKET/3073/2013 ANTIGEN (FORMALDEHYDE INACTIVATED), AND INFLUENZA B VIRUS B/WASHINGTON/02/2019 ANTIGEN (FORMALDEHYDE INACTIVATED) 0.5 ML: 15; 15; 15; 15 INJECTION, SUSPENSION INTRAMUSCULAR at 12:00

## 2021-03-01 RX ADMIN — ALBUTEROL SULFATE 2 PUFF: 90 AEROSOL, METERED RESPIRATORY (INHALATION) at 09:23

## 2021-03-01 RX ADMIN — LEVOTHYROXINE SODIUM 100 MCG: 0.1 TABLET ORAL at 05:05

## 2021-03-01 RX ADMIN — KETOROLAC TROMETHAMINE 30 MG: 30 INJECTION, SOLUTION INTRAMUSCULAR; INTRAVENOUS at 04:55

## 2021-03-01 RX ADMIN — KETOROLAC TROMETHAMINE 30 MG: 30 INJECTION, SOLUTION INTRAMUSCULAR; INTRAVENOUS at 11:00

## 2021-03-01 RX ADMIN — OXYCODONE HYDROCHLORIDE AND ACETAMINOPHEN 1 TABLET: 5; 325 TABLET ORAL at 08:58

## 2021-03-01 RX ADMIN — DOCUSATE SODIUM 50 MG AND SENNOSIDES 8.6 MG 2 TABLET: 8.6; 5 TABLET, FILM COATED ORAL at 04:56

## 2021-03-01 RX ADMIN — ALPRAZOLAM 1 MG: 1 TABLET ORAL at 07:38

## 2021-03-01 RX ADMIN — ALBUTEROL SULFATE 2 PUFF: 90 AEROSOL, METERED RESPIRATORY (INHALATION) at 03:21

## 2021-03-01 RX ADMIN — ONDANSETRON 4 MG: 2 INJECTION INTRAMUSCULAR; INTRAVENOUS at 08:58

## 2021-03-01 ASSESSMENT — PAIN DESCRIPTION - PAIN TYPE
TYPE: CHRONIC PAIN

## 2021-03-01 ASSESSMENT — ENCOUNTER SYMPTOMS
HEADACHES: 0
DOUBLE VISION: 0
NAUSEA: 0
CHILLS: 0
PALPITATIONS: 0
FEVER: 0
SHORTNESS OF BREATH: 0
COUGH: 0
DIZZINESS: 0
BLURRED VISION: 0
WHEEZING: 0
VOMITING: 0

## 2021-03-01 ASSESSMENT — COPD QUESTIONNAIRES
COPD SCREENING SCORE: 5
HAVE YOU SMOKED AT LEAST 100 CIGARETTES IN YOUR ENTIRE LIFE: YES
DO YOU EVER COUGH UP ANY MUCUS OR PHLEGM?: YES, A FEW DAYS A WEEK OR MONTH
DURING THE PAST 4 WEEKS HOW MUCH DID YOU FEEL SHORT OF BREATH: SOME OF THE TIME

## 2021-03-01 ASSESSMENT — LIFESTYLE VARIABLES: EVER_SMOKED: YES

## 2021-03-01 NOTE — FLOWSHEET NOTE
RN stated patient requesting neb. Patient sleeping at this time. No respiratory distress noted, currently on ra with an spo2 greater than 94%, rr 16. Will re-assess if patient wakes and requests new neb tx.

## 2021-03-01 NOTE — PROGRESS NOTES
Assumed care of pt @ 0715, bedside report received from NICK Raymond.  Pt A&OX4 and has pain in her leg and back. Bed locked and lowered with call light within reach and questions answered during present time.

## 2021-03-01 NOTE — RESPIRATORY CARE
Called by RN for prn neb tx. No Respiratory distress noted. No changes to pt vital signs or breath sounds at time from previous assessment. Charge RN stated PRN is wanted to increased in heart rate. informed that is not an indication for a prn neb treatment. Charge RT informed

## 2021-03-01 NOTE — CARE PLAN
Problem: Communication  Goal: The ability to communicate needs accurately and effectively will improve  Outcome: PROGRESSING AS EXPECTED  Intervention: [place] patient and significant other/support system to call light to alert staff of needs  Note: Complete   Intervention: Reorient patient to environment as needed  Note: Complete      Problem: Safety  Goal: Will remain free from falls  Outcome: PROGRESSING AS EXPECTED  Intervention: Assess risk factors for falls  Flowsheets (Taken 3/1/2021 1039)  Mobility Status Assessment: 0-Ambulates & Transfers Independently. No Assistance Required  Note: Low fall risk   Intervention: Implement fall precautions  Flowsheets  Taken 3/1/2021 1039  Bed Alarm: Alarm Not On  Taken 3/1/2021 0730  Environmental Precautions:   Treaded Slipper Socks on Patient   Personal Belongings, Wastebasket, Call Bell etc. in Easy Reach   Transferred to Stronger Side   Report Given to Other Health Care Providers Regarding Fall Risk   Bed in Low Position   Communication Sign for Patients & Families   Mobility Assessed & Appropriate Sign Placed  Note: Patient educated on fall risk

## 2021-03-01 NOTE — PROGRESS NOTES
Pt requesting since beginning of shift for breathing treatments. RT Preston contacted 3x, by this RN. Rt also contacted by charge RN as pt persistent for breathing treatment and RT stating pt was sleeping when entering to assess for breathing treatment. RT charge to bedside. Pt educated on indications for breathing treatments.   Pt asked about getting an inhaler. RT administered albuterol inhaler.     Paged [facility] regarding other options to help break up upper congestion pt is experiencing.  Spoke with MD Soliman. Orders for A capella and continue mucinex as scheduled.
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
## 2021-03-01 NOTE — PROGRESS NOTES
Bedside report received from NICK Rowe. POC discussed with pt; all questions answered at this time. VSS. Pt asking for breathing treatment prior to bedtime. Will contact RT. Fall precautions in place. Bed locked in lowest position. Bed alarm on. Call light within reach.

## 2021-03-01 NOTE — PROGRESS NOTES
Pt transported of the unit with RN. Pt went of the unit with Wheelchair and . Pt verbally acknowledges all discharge instructions, medications, and medications regimen. Pt gathered all personal belongings, Tele box and IV have been removed. Pt home medications reviewed. All questions and needs have been met at this time.

## 2021-03-01 NOTE — ASSESSMENT & PLAN NOTE
aware evaluated  Patient has been getting xanax 1mg TID prn regularly  Opiate, and oxycodone were not observed on
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
## 2021-03-08 ENCOUNTER — PATIENT OUTREACH (OUTPATIENT)
Dept: HEALTH INFORMATION MANAGEMENT | Facility: OTHER | Age: 46
End: 2021-03-08

## 2021-03-09 ENCOUNTER — APPOINTMENT (OUTPATIENT)
Dept: RADIOLOGY | Facility: MEDICAL CENTER | Age: 46
End: 2021-03-09
Attending: EMERGENCY MEDICINE
Payer: MEDICAID

## 2021-03-09 ENCOUNTER — HOSPITAL ENCOUNTER (EMERGENCY)
Facility: MEDICAL CENTER | Age: 46
End: 2021-03-09
Attending: EMERGENCY MEDICINE
Payer: MEDICAID

## 2021-03-09 VITALS
SYSTOLIC BLOOD PRESSURE: 99 MMHG | HEART RATE: 68 BPM | HEIGHT: 68 IN | TEMPERATURE: 97.6 F | BODY MASS INDEX: 25.06 KG/M2 | WEIGHT: 165.34 LBS | OXYGEN SATURATION: 98 % | RESPIRATION RATE: 16 BRPM | DIASTOLIC BLOOD PRESSURE: 54 MMHG

## 2021-03-09 DIAGNOSIS — K21.9 GASTROESOPHAGEAL REFLUX DISEASE, UNSPECIFIED WHETHER ESOPHAGITIS PRESENT: ICD-10-CM

## 2021-03-09 DIAGNOSIS — J44.1 ACUTE EXACERBATION OF CHRONIC OBSTRUCTIVE PULMONARY DISEASE (COPD) (HCC): ICD-10-CM

## 2021-03-09 LAB
APPEARANCE UR: CLEAR
BILIRUB UR QL STRIP.AUTO: NEGATIVE
COLOR UR: YELLOW
EKG IMPRESSION: NORMAL
GLUCOSE UR STRIP.AUTO-MCNC: NEGATIVE MG/DL
KETONES UR STRIP.AUTO-MCNC: NEGATIVE MG/DL
LEUKOCYTE ESTERASE UR QL STRIP.AUTO: NEGATIVE
MICRO URNS: NORMAL
NITRITE UR QL STRIP.AUTO: NEGATIVE
PH UR STRIP.AUTO: 6.5 [PH] (ref 5–8)
PROT UR QL STRIP: NEGATIVE MG/DL
RBC UR QL AUTO: NEGATIVE
SP GR UR STRIP.AUTO: 1.02
UROBILINOGEN UR STRIP.AUTO-MCNC: 1 MG/DL

## 2021-03-09 PROCEDURE — 93005 ELECTROCARDIOGRAM TRACING: CPT

## 2021-03-09 PROCEDURE — 71045 X-RAY EXAM CHEST 1 VIEW: CPT

## 2021-03-09 PROCEDURE — A9270 NON-COVERED ITEM OR SERVICE: HCPCS | Performed by: EMERGENCY MEDICINE

## 2021-03-09 PROCEDURE — 700102 HCHG RX REV CODE 250 W/ 637 OVERRIDE(OP): Performed by: EMERGENCY MEDICINE

## 2021-03-09 PROCEDURE — 96372 THER/PROPH/DIAG INJ SC/IM: CPT

## 2021-03-09 PROCEDURE — 700111 HCHG RX REV CODE 636 W/ 250 OVERRIDE (IP): Performed by: EMERGENCY MEDICINE

## 2021-03-09 PROCEDURE — 81003 URINALYSIS AUTO W/O SCOPE: CPT

## 2021-03-09 PROCEDURE — 93005 ELECTROCARDIOGRAM TRACING: CPT | Performed by: EMERGENCY MEDICINE

## 2021-03-09 PROCEDURE — 99284 EMERGENCY DEPT VISIT MOD MDM: CPT

## 2021-03-09 RX ORDER — KETOROLAC TROMETHAMINE 30 MG/ML
30 INJECTION, SOLUTION INTRAMUSCULAR; INTRAVENOUS ONCE
Status: COMPLETED | OUTPATIENT
Start: 2021-03-09 | End: 2021-03-09

## 2021-03-09 RX ADMIN — KETOROLAC TROMETHAMINE 30 MG: 30 INJECTION, SOLUTION INTRAMUSCULAR; INTRAVENOUS at 04:43

## 2021-03-09 RX ADMIN — LIDOCAINE HYDROCHLORIDE 30 ML: 20 SOLUTION OROPHARYNGEAL at 05:20

## 2021-03-09 ASSESSMENT — FIBROSIS 4 INDEX: FIB4 SCORE: 0.62

## 2021-03-09 NOTE — DISCHARGE INSTRUCTIONS
Follow-up with pulmonology and cardiology as scheduled this afternoon for reevaluation to establish care, for medication management and further workup is indicated.  Encourage follow-up with G.I. specialist as well.  Follow-up with her primary care doctor as scheduled later this month unless an earlier appointment can be made.    Continue medications as previously indicated.    Return the emergency department for persistent or worsening difficulty breathing, wheezing, chest pain, syncope, intractable vomiting, abdominal pain, fever or other new concerns.

## 2021-03-09 NOTE — ED PROVIDER NOTES
"ED Provider Note    CHIEF COMPLAINT  Chief Complaint   Patient presents with   • Shortness of Breath     x 4 days. Patient was recently hospitalized for bilateral pneumonia. For the last several days she has been calling EMS to her home for an \"exacerbation.\" Patient states she has COPD.    • Vomiting     x4 days. Patient states \"I'm choking on my vomit every night.\"    • Chest Pain     Started yesterday, burning pain and tightness \"to both upper lobes and my sternum\"   • Back Pain     started yesterday, \"like someone punched me in the back.\"        HPI  Madelyn Robb is a 45 y.o. female who presents to the emergency department by ambulance or shortness of breath. Patient describes history of COPD, actually has appointment with cardiology, pulmonology later this afternoon after recent hospitalizations for COPD exacerbation and possible pneumonia. States she continues to have intermittent shortness of breath, she woke up with shortness of breath overnight but used her inhaler without relief, then nebulizer and felt much better before EMS arrival. Patient states she also had vomiting, which contributed to her waking. She states this is happening every night. She has been treated for Gerd, previously with omeprazole, more recently Nexium. She plans to follow up with G.I. doctor as well. She is trying to increase calories medial sizes due to Graves' disease, this may be contributing to her Gerd.    Shortness of breath, improved prior to arrival with home medications, albuterol. No new fever chills. Chronic cough, no sputum production. Patient is complaining of some right-sided mid thoracic back pain, muscle pain, worse with cough. Request\" Toradol.\" Denies chest pain, palpitations or syncope. Denies abdominal pain or diarrhea.    REVIEW OF SYSTEMS  See HPI for further details. All other systems are negative.     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    SOCIAL HISTORY  Social History     Tobacco Use   • Smoking status: Former Smoker     Packs/day: 0.25     Years: 25.00     Pack years: 6.25     Types: Cigarettes   • Smokeless tobacco: Never Used   Substance and Sexual Activity   • Alcohol use: Never     Comment: Denies any alcohol   • Drug use: Yes     Types: Marijuana, Inhaled     Comment: Marijuanna   • Sexual activity: Yes     Comment: engaged       SURGICAL HISTORY   has a past surgical history that includes open reduction; appendectomy; abdominal exploration; cholecystectomy; and tubal coagulation laparoscopic bilateral.    CURRENT MEDICATIONS  Home Medications     Reviewed by Nohemy Coombs R.N. (Registered Nurse) on 03/09/21 at 0338  Med List Status: Not Addressed   Medication Last Dose Status   acetaminophen (TYLENOL) 325 MG Tab  Active   acetaminophen (TYLENOL) 500 MG Tab  Active   albuterol (PROVENTIL) 2.5mg/3ml Nebu Soln solution for nebulization  Active   albuterol (VENTOLIN HFA) 108 (90 Base) MCG/ACT Aero Soln inhalation aerosol  Active   alprazolam (XANAX) 1 MG TABS  Active   aspirin EC (ECOTRIN) 81 MG Tablet Delayed Response  Active   atorvastatin (LIPITOR) 40 MG Tab  Active   dexamethasone (DECADRON) 2 MG tablet  Active   dexamethasone (DECADRON) 4 MG Tab  Active   guaiFENesin ER (MUCINEX) 600 MG TABLET SR 12 HR  Active   ibuprofen (MOTRIN) 200 MG Tab  Active   levothyroxine (SYNTHROID) 75 MCG Tab  Active   melatonin 5 mg Tab  Active  "  ondansetron (ZOFRAN ODT) 4 MG TABLET DISPERSIBLE  Active   Pseudoephedrine-APAP-DM (DAYQUIL PO)  Active   tiotropium (SPIRIVA HANDIHALER) 18 MCG Cap  Active                ALLERGIES  Allergies   Allergen Reactions   • Carbamazepine Rash and Swelling     .   • Cephalexin Rash and Swelling     Full body sweeling and rash    • Mushroom Extract Complex Swelling   • Penicillins Anaphylaxis     Reported that her throat closes   • Phenytoin Swelling     Swelling   • Rondec Rash and Swelling     .   • Sulfa Drugs Anaphylaxis     Reported that her throat closes   • Cefadroxil Unspecified     Pt is not sure, but knows that she has an allergy to this medication.       PHYSICAL EXAM  VITAL SIGNS: BP (!) 98/54   Pulse 66   Temp 36.6 °C (97.8 °F) (Temporal)   Resp 20   Ht 1.727 m (5' 8\")   Wt 75 kg (165 lb 5.5 oz)   LMP 01/17/2015   SpO2 97%   BMI 25.14 kg/m²   Pulse ox interpretation: I interpret this pulse ox as normal.  Constitutional: Alert in no apparent distress.  HENT: Normocephalic, atraumatic. Bilateral external ears normal, Nose normal. Moist mucous membranes.    Eyes: Pupils are equal and reactive, Conjunctiva normal.   Neck: Normal range of motion, Supple. No Strider dyspnea. New TBD.  Lymphatic: No lymphadenopathy noted.   Cardiovascular: Regular rate and rhythm, no murmurs. Distal pulses intact.  No peripheral edema.  Thorax & Lungs: Normal breath sounds.  No wheezing/rales/ronchi. No increased work of breathing, clipped speech or retractions.   Abdomen: Soft, non-distended, non-tender to palpation.   Skin: Warm, Dry, No erythema, No rash.   Musculoskeletal: Good range of motion in all major joints. Some reproducible right thoracic paraspinal discomfort with palpation. No midline pain. No cutaneous changes.  Neurologic: Alert and oriented times four. Speech clinical history. Most were extremity spontaneously.  Psychiatric: anxious otherwise affect normal, Judgment normal, Mood normal.       DIAGNOSTIC " "STUDIES / PROCEDURES    RADIOLOGY  DX-CHEST-PORTABLE (1 VIEW)   Final Result         1.  Interstitial infiltrates, decreased since prior study.          COURSE & MEDICAL DECISION MAKING  Nursing notes and vital signs were reviewed. (See chart for details)  The patients records were reviewed, history was obtained from the patient;     Medical record review: hospitalizations for COPD exacerbation, possible pneumonia in the past month. Initially treated for atypical or viral pneumonia, antibiotics discontinued after further evaluation. CT negative for PE. Echocardiogram unchanged from 2018 and within normal limits. Encouraged follow-up with pulmonology for PFTs.    Evaluation for shortness of breath likely secondary to exacerbation of her COPD. Symptomatology improved with her home regimen before EMS arrival. She is comfortable hearing the emergency department, lung auscultation is unremarkable, clear without wheezing, increased work of breathing, hypoxia. She is on home oxygen intermittently, but has appropriate situations on room air at this time. History of quote vomiting\" although this does sound like acid reflux/GERD and she is being treated for this as well. Patient request a quote Toradol\" and has improved thoracic musculoskeletal pain in her back, suspect muscle strain, possibly secondary to cough. No other chest pain, syncope. Symptomatology is atypical for ACS. No clinical radiographic evidence for pneumonia, pneumothorax or thoracic oratory section. Interstitial infiltrates have improved from previous study and are otherwise chronic. No clinical evidence or pneumonia or sepsis. She also requested quote G.I. cocktail\" rather than no denser on for the quote burning\" in her stomach. This helps as well. She is tolerating oral fluids without difficulty. No recurrent vomiting. Abdominal exam is benign. Less likely viral G.I. illness or other acute interest abdominal pathology. No indication for further workup " today. Patient does have a point with cardiology and pulmonology to establish care this afternoon.    Patient is stable for discharge at this time, anticipatory guidance provided, close follow-up is encouraged, and strict ED return instructions have been detailed. Patient is agreeable to the disposition and plan.    Patient's blood pressure was elevated in the emergency department, and has been referred to primary care for close monitoring.    FINAL IMPRESSION  (J44.1) Acute exacerbation of chronic obstructive pulmonary disease (COPD) ([facility])  (K21.9) Gastroesophageal reflux disease, unspecified whether esophagitis present      Electronically signed by: Tammy Medina D.O., 3/9/2021 4:53 AM      This dictation was created using voice recognition software. The accuracy of the dictation is limited to the abilities of the software. I expect there may be some errors of grammar and possibly content. The nursing notes were reviewed and certain aspects of this information were incorporated into this note.

## 2021-03-16 SDOH — ECONOMIC STABILITY: FOOD INSECURITY: WITHIN THE PAST 12 MONTHS, YOU WORRIED THAT YOUR FOOD WOULD RUN OUT BEFORE YOU GOT MONEY TO BUY MORE.: NEVER TRUE

## 2021-03-16 SDOH — ECONOMIC STABILITY: TRANSPORTATION INSECURITY
IN THE PAST 12 MONTHS, HAS THE LACK OF TRANSPORTATION KEPT YOU FROM MEDICAL APPOINTMENTS OR FROM GETTING MEDICATIONS?: YES

## 2021-03-16 SDOH — ECONOMIC STABILITY: TRANSPORTATION INSECURITY
IN THE PAST 12 MONTHS, HAS LACK OF TRANSPORTATION KEPT YOU FROM MEETINGS, WORK, OR FROM GETTING THINGS NEEDED FOR DAILY LIVING?: NO

## 2021-03-16 SDOH — ECONOMIC STABILITY: FOOD INSECURITY: WITHIN THE PAST 12 MONTHS, THE FOOD YOU BOUGHT JUST DIDN'T LAST AND YOU DIDN'T HAVE MONEY TO GET MORE.: NEVER TRUE

## 2021-03-16 ASSESSMENT — SOCIAL DETERMINANTS OF HEALTH (SDOH): HOW HARD IS IT FOR YOU TO PAY FOR THE VERY BASICS LIKE FOOD, HOUSING, MEDICAL CARE, AND HEATING?: SOMEWHAT HARD

## 2021-03-16 NOTE — PROGRESS NOTES
Community Health Worker Intake  • Social determinates of health intake Complete.   • Identified barriers to transportation.  • Contact information provided to Madelyn Robb   • Has PCP appointment scheduled   • Outpatient assessment completed.  • Did the patient receive medications post discharge: Yes    CHW Joy called pt and spoke with pts spouse to introduce CCM program. Per Alex pt could use some assistance with transportation, CHW will mail resources. Alex identifies no other barriers to resources and expressed no other needs at this time.     Plan: CHW  Will d/c pt from CCM team.

## 2023-06-14 ENCOUNTER — APPOINTMENT (OUTPATIENT)
Dept: OPHTHALMOLOGY | Facility: MEDICAL CENTER | Age: 48
End: 2023-06-14
Payer: MEDICAID

## 2023-07-25 ENCOUNTER — OFFICE VISIT (OUTPATIENT)
Dept: OPHTHALMOLOGY | Facility: MEDICAL CENTER | Age: 48
End: 2023-07-25
Payer: COMMERCIAL

## 2023-07-25 DIAGNOSIS — H05.20 PROPTOSIS: ICD-10-CM

## 2023-07-25 DIAGNOSIS — H52.13 MYOPIA OF BOTH EYES: ICD-10-CM

## 2023-07-25 DIAGNOSIS — H05.89 THYROID ORBITOPATHY: ICD-10-CM

## 2023-07-25 DIAGNOSIS — H46.8 OPTIC NEUROPATHY, COMPRESSIVE: ICD-10-CM

## 2023-07-25 DIAGNOSIS — S02.85XS CLOSED FRACTURE OF ORBIT, SEQUELA (HCC): ICD-10-CM

## 2023-07-25 DIAGNOSIS — H49.9 OPHTHALMOPLEGIA: ICD-10-CM

## 2023-07-25 DIAGNOSIS — E07.9 THYROID ORBITOPATHY: ICD-10-CM

## 2023-07-25 PROBLEM — S02.85XA ORBITAL FRACTURE (HCC): Status: ACTIVE | Noted: 2023-07-25

## 2023-07-25 PROBLEM — H47.099: Status: ACTIVE | Noted: 2023-07-25

## 2023-07-25 PROCEDURE — 92250 FUNDUS PHOTOGRAPHY W/I&R: CPT | Performed by: OPHTHALMOLOGY

## 2023-07-25 PROCEDURE — 92015 DETERMINE REFRACTIVE STATE: CPT | Performed by: OPHTHALMOLOGY

## 2023-07-25 PROCEDURE — 99205 OFFICE O/P NEW HI 60 MIN: CPT | Mod: 25 | Performed by: OPHTHALMOLOGY

## 2023-07-25 PROCEDURE — 92060 SENSORIMOTOR EXAMINATION: CPT | Performed by: OPHTHALMOLOGY

## 2023-07-25 RX ORDER — LEVOTHYROXINE SODIUM 0.12 MG/1
125 TABLET ORAL
COMMUNITY
Start: 2023-07-03

## 2023-07-25 RX ORDER — PANTOPRAZOLE SODIUM 40 MG/1
40 TABLET, DELAYED RELEASE ORAL EVERY MORNING
COMMUNITY
Start: 2023-07-12

## 2023-07-25 RX ORDER — MELOXICAM 15 MG/1
15 TABLET ORAL EVERY MORNING
COMMUNITY
Start: 2023-07-14

## 2023-07-25 RX ORDER — TIZANIDINE 2 MG/1
TABLET ORAL
COMMUNITY
Start: 2023-06-29

## 2023-07-25 RX ORDER — HYDROXYZINE 50 MG/1
50 TABLET, FILM COATED ORAL
COMMUNITY
Start: 2023-07-06

## 2023-07-25 ASSESSMENT — VISUAL ACUITY
OD_PH_SC+: -1
OD_SC: 20/50
OS_PH_SC+: -2
OS_SC: 20/50
OD_SC+: -1
OS_PH_SC: 20/25
METHOD: SNELLEN - LINEAR
OS_SC+: -1
OD_PH_SC: 20/20

## 2023-07-25 ASSESSMENT — CONF VISUAL FIELD
OD_SUPERIOR_TEMPORAL_RESTRICTION: 0
OS_NORMAL: 1
OD_SUPERIOR_NASAL_RESTRICTION: 0
OS_SUPERIOR_TEMPORAL_RESTRICTION: 0
OS_INFERIOR_NASAL_RESTRICTION: 0
OD_NORMAL: 1
OD_INFERIOR_NASAL_RESTRICTION: 0
OD_INFERIOR_TEMPORAL_RESTRICTION: 0
OS_INFERIOR_TEMPORAL_RESTRICTION: 0
OS_SUPERIOR_NASAL_RESTRICTION: 0

## 2023-07-25 ASSESSMENT — CUP TO DISC RATIO
OD_RATIO: 0.4
OS_RATIO: 0.4

## 2023-07-25 ASSESSMENT — ENCOUNTER SYMPTOMS
EYE PAIN: 1
EYE DISCHARGE: 1
HEADACHES: 1
BLURRED VISION: 1

## 2023-07-25 ASSESSMENT — REFRACTION_MANIFEST
OD_AXIS: 108
OD_SPHERE: -0.75
OS_AXIS: 078
OD_CYLINDER: +2.00
OS_SPHERE: -1.00
METHOD_AUTOREFRACTION: 1
OS_CYLINDER: +1.75

## 2023-07-25 ASSESSMENT — EXTERNAL EXAM - RIGHT EYE: OD_EXAM: 2+ PROPTOSIS

## 2023-07-25 ASSESSMENT — TONOMETRY
IOP_METHOD: I-CARE
OS_IOP_MMHG: 15
OD_IOP_MMHG: 13

## 2023-07-25 ASSESSMENT — SLIT LAMP EXAM - LIDS
COMMENTS: NORMAL
COMMENTS: NORMAL

## 2023-07-25 ASSESSMENT — EXTERNAL EXAM - LEFT EYE: OS_EXAM: 2+ PROPTOSIS
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
## 2023-07-26 NOTE — ASSESSMENT & PLAN NOTE
7/25/2023 - significnat bilater abduction deficits secondary to combination of thyroid orbitopathy plus possible restrictive from prior orbital fracture. Might require repeat CT orbits

## 2023-07-26 NOTE — ASSESSMENT & PLAN NOTE
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 Has evidence of some residual damage with area of segmental atrophy left nerve and OCT NFL thickness 87 OD and 76 OS

## 2023-07-26 NOTE — ASSESSMENT & PLAN NOTE
7/25/2023- Significant thyroid orbitopathy. % hears ago had severe chemosis, proptosis, worsening ophthalmoplegia and compressive orbitopathy. Was treated with high does intravenous then oral steroids. This improved the chemosis and only some of the proptosis, but still significant EOM restriction, proptosis, orbital discomfort, eyelid swelling with retraction, and pain on eye movement. TAMARA score of 5. Therefore is a candidate for Tepezza. Gave information and discussed th risks, benefits and side effects. Would like to precede. Will obtain baseline audiogram.

## 2023-07-26 NOTE — ASSESSMENT & PLAN NOTE
7/25/2023- history of orbital fractures exacerbating EOM restriction. States that in the past saw plastic surgeon and given extent recommended not preceding with any surgical repair.

## 2024-02-06 ENCOUNTER — APPOINTMENT (OUTPATIENT)
Dept: ADMISSIONS | Facility: MEDICAL CENTER | Age: 49
End: 2024-02-06
Attending: SURGERY
Payer: COMMERCIAL

## 2024-02-15 ENCOUNTER — PRE-ADMISSION TESTING (OUTPATIENT)
Dept: ADMISSIONS | Facility: MEDICAL CENTER | Age: 49
End: 2024-02-15
Attending: SURGERY
Payer: COMMERCIAL

## 2024-02-15 RX ORDER — TEMAZEPAM 15 MG/1
1 CAPSULE ORAL NIGHTLY PRN
COMMUNITY

## 2024-02-15 RX ORDER — MAGNESIUM OXIDE/MAG AA CHELATE 300 MG
1 CAPSULE ORAL
COMMUNITY

## 2024-02-15 RX ORDER — ACETAMINOPHEN 325 MG/1
650 TABLET ORAL EVERY 6 HOURS PRN
COMMUNITY

## 2024-02-15 NOTE — OR NURSING
During telephone pre admit appointment patient reports she has a history of malignant hyperthermia. Surgery scheduling, anesthesia and Dr. Church's office notified.

## 2024-02-16 ENCOUNTER — APPOINTMENT (OUTPATIENT)
Dept: ADMISSIONS | Facility: MEDICAL CENTER | Age: 49
End: 2024-02-16
Attending: SURGERY
Payer: COMMERCIAL

## 2024-02-16 DIAGNOSIS — Z01.812 PRE-OPERATIVE LABORATORY EXAMINATION: ICD-10-CM

## 2024-02-16 DIAGNOSIS — Z01.811 PRE-OPERATIVE RESPIRATORY EXAMINATION: ICD-10-CM

## 2024-02-16 DIAGNOSIS — Z01.810 PRE-OPERATIVE CARDIOVASCULAR EXAMINATION: ICD-10-CM

## 2024-03-04 ENCOUNTER — PRE-ADMISSION TESTING (OUTPATIENT)
Dept: ADMISSIONS | Facility: MEDICAL CENTER | Age: 49
End: 2024-03-04
Attending: SURGERY
Payer: COMMERCIAL

## 2024-03-04 ENCOUNTER — HOSPITAL ENCOUNTER (OUTPATIENT)
Dept: RADIOLOGY | Facility: MEDICAL CENTER | Age: 49
End: 2024-03-04
Attending: SURGERY
Payer: COMMERCIAL

## 2024-03-04 DIAGNOSIS — Z01.811 PRE-OPERATIVE RESPIRATORY EXAMINATION: ICD-10-CM

## 2024-03-04 DIAGNOSIS — Z01.812 PRE-OPERATIVE LABORATORY EXAMINATION: ICD-10-CM

## 2024-03-04 DIAGNOSIS — Z01.810 PRE-OPERATIVE CARDIOVASCULAR EXAMINATION: ICD-10-CM

## 2024-03-04 LAB
ANION GAP SERPL CALC-SCNC: 14 MMOL/L (ref 7–16)
BUN SERPL-MCNC: 13 MG/DL (ref 8–22)
CALCIUM SERPL-MCNC: 9.8 MG/DL (ref 8.5–10.5)
CHLORIDE SERPL-SCNC: 101 MMOL/L (ref 96–112)
CO2 SERPL-SCNC: 23 MMOL/L (ref 20–33)
CREAT SERPL-MCNC: 0.58 MG/DL (ref 0.5–1.4)
EKG IMPRESSION: NORMAL
ERYTHROCYTE [DISTWIDTH] IN BLOOD BY AUTOMATED COUNT: 32.4 FL (ref 35.9–50)
GFR SERPLBLD CREATININE-BSD FMLA CKD-EPI: 112 ML/MIN/1.73 M 2
GLUCOSE SERPL-MCNC: 90 MG/DL (ref 65–99)
HCT VFR BLD AUTO: 42.8 % (ref 37–47)
HGB BLD-MCNC: 13.7 G/DL (ref 12–16)
MCH RBC QN AUTO: 20.4 PG (ref 27–33)
MCHC RBC AUTO-ENTMCNC: 32 G/DL (ref 32.2–35.5)
MCV RBC AUTO: 63.7 FL (ref 81.4–97.8)
PLATELET # BLD AUTO: 195 K/UL (ref 164–446)
PMV BLD AUTO: 10.5 FL (ref 9–12.9)
POTASSIUM SERPL-SCNC: 4.5 MMOL/L (ref 3.6–5.5)
RBC # BLD AUTO: 6.72 M/UL (ref 4.2–5.4)
SODIUM SERPL-SCNC: 138 MMOL/L (ref 135–145)
WBC # BLD AUTO: 4.7 K/UL (ref 4.8–10.8)

## 2024-03-04 PROCEDURE — 86800 THYROGLOBULIN ANTIBODY: CPT

## 2024-03-04 PROCEDURE — 71045 X-RAY EXAM CHEST 1 VIEW: CPT

## 2024-03-04 PROCEDURE — 93005 ELECTROCARDIOGRAM TRACING: CPT

## 2024-03-04 PROCEDURE — 93010 ELECTROCARDIOGRAM REPORT: CPT | Performed by: INTERNAL MEDICINE

## 2024-03-04 PROCEDURE — 85027 COMPLETE CBC AUTOMATED: CPT

## 2024-03-04 PROCEDURE — 36415 COLL VENOUS BLD VENIPUNCTURE: CPT

## 2024-03-04 PROCEDURE — 80048 BASIC METABOLIC PNL TOTAL CA: CPT

## 2024-03-05 ENCOUNTER — ANESTHESIA EVENT (OUTPATIENT)
Dept: SURGERY | Facility: MEDICAL CENTER | Age: 49
End: 2024-03-05
Payer: COMMERCIAL

## 2024-03-06 ENCOUNTER — HOSPITAL ENCOUNTER (OUTPATIENT)
Facility: MEDICAL CENTER | Age: 49
End: 2024-03-06
Attending: SURGERY | Admitting: SURGERY
Payer: COMMERCIAL

## 2024-03-06 ENCOUNTER — ANESTHESIA (OUTPATIENT)
Dept: SURGERY | Facility: MEDICAL CENTER | Age: 49
End: 2024-03-06
Payer: COMMERCIAL

## 2024-03-06 VITALS
BODY MASS INDEX: 26.16 KG/M2 | SYSTOLIC BLOOD PRESSURE: 138 MMHG | TEMPERATURE: 96.9 F | WEIGHT: 172.62 LBS | OXYGEN SATURATION: 95 % | HEIGHT: 68 IN | DIASTOLIC BLOOD PRESSURE: 69 MMHG | RESPIRATION RATE: 16 BRPM | HEART RATE: 86 BPM

## 2024-03-06 DIAGNOSIS — G89.18 POSTOPERATIVE PAIN: ICD-10-CM

## 2024-03-06 DIAGNOSIS — R57.9 SHOCK (HCC): ICD-10-CM

## 2024-03-06 DIAGNOSIS — E05.00 GRAVES DISEASE: ICD-10-CM

## 2024-03-06 LAB
ALBUMIN SERPL BCP-MCNC: 4.4 G/DL (ref 3.2–4.9)
ALBUMIN SERPL BCP-MCNC: 4.6 G/DL (ref 3.2–4.9)
CALCIUM SERPL-MCNC: 8.8 MG/DL (ref 8.5–10.5)
CALCIUM SERPL-MCNC: 9 MG/DL (ref 8.5–10.5)
PATHOLOGY CONSULT NOTE: NORMAL
THYROGLOB AB SERPL-ACNC: 3.3 IU/ML (ref 0–4)

## 2024-03-06 PROCEDURE — 700101 HCHG RX REV CODE 250: Mod: UD | Performed by: ANESTHESIOLOGY

## 2024-03-06 PROCEDURE — 82040 ASSAY OF SERUM ALBUMIN: CPT | Mod: 91

## 2024-03-06 PROCEDURE — 160029 HCHG SURGERY MINUTES - 1ST 30 MINS LEVEL 4: Performed by: SURGERY

## 2024-03-06 PROCEDURE — 700105 HCHG RX REV CODE 258: Mod: UD | Performed by: SURGERY

## 2024-03-06 PROCEDURE — 700102 HCHG RX REV CODE 250 W/ 637 OVERRIDE(OP): Mod: UD | Performed by: SURGERY

## 2024-03-06 PROCEDURE — 700102 HCHG RX REV CODE 250 W/ 637 OVERRIDE(OP): Mod: UD | Performed by: ANESTHESIOLOGY

## 2024-03-06 PROCEDURE — 160035 HCHG PACU - 1ST 60 MINS PHASE I: Performed by: SURGERY

## 2024-03-06 PROCEDURE — 88307 TISSUE EXAM BY PATHOLOGIST: CPT

## 2024-03-06 PROCEDURE — 82310 ASSAY OF CALCIUM: CPT | Mod: 91

## 2024-03-06 PROCEDURE — 160041 HCHG SURGERY MINUTES - EA ADDL 1 MIN LEVEL 4: Performed by: SURGERY

## 2024-03-06 PROCEDURE — A9270 NON-COVERED ITEM OR SERVICE: HCPCS | Mod: UD | Performed by: ANESTHESIOLOGY

## 2024-03-06 PROCEDURE — 700111 HCHG RX REV CODE 636 W/ 250 OVERRIDE (IP): Mod: UD | Performed by: ANESTHESIOLOGY

## 2024-03-06 PROCEDURE — 160009 HCHG ANES TIME/MIN: Performed by: SURGERY

## 2024-03-06 PROCEDURE — 160048 HCHG OR STATISTICAL LEVEL 1-5: Performed by: SURGERY

## 2024-03-06 PROCEDURE — G0378 HOSPITAL OBSERVATION PER HR: HCPCS

## 2024-03-06 PROCEDURE — A9270 NON-COVERED ITEM OR SERVICE: HCPCS | Mod: UD | Performed by: SURGERY

## 2024-03-06 PROCEDURE — 160002 HCHG RECOVERY MINUTES (STAT): Performed by: SURGERY

## 2024-03-06 RX ORDER — GABAPENTIN 300 MG/1
300 CAPSULE ORAL ONCE
Status: COMPLETED | OUTPATIENT
Start: 2024-03-06 | End: 2024-03-06

## 2024-03-06 RX ORDER — EPHEDRINE SULFATE 50 MG/ML
5 INJECTION, SOLUTION INTRAVENOUS
Status: DISCONTINUED | OUTPATIENT
Start: 2024-03-06 | End: 2024-03-06 | Stop reason: HOSPADM

## 2024-03-06 RX ORDER — ACETAMINOPHEN 325 MG/1
650 TABLET ORAL EVERY 6 HOURS PRN
Status: DISCONTINUED | OUTPATIENT
Start: 2024-03-06 | End: 2024-03-06

## 2024-03-06 RX ORDER — HALOPERIDOL 5 MG/ML
1 INJECTION INTRAMUSCULAR EVERY 6 HOURS PRN
Status: DISCONTINUED | OUTPATIENT
Start: 2024-03-06 | End: 2024-03-06 | Stop reason: HOSPADM

## 2024-03-06 RX ORDER — MELOXICAM 7.5 MG/1
15 TABLET ORAL EVERY MORNING
Status: DISCONTINUED | OUTPATIENT
Start: 2024-03-07 | End: 2024-03-06 | Stop reason: HOSPADM

## 2024-03-06 RX ORDER — HYDROCODONE BITARTRATE AND ACETAMINOPHEN 5; 325 MG/1; MG/1
1-2 TABLET ORAL EVERY 6 HOURS PRN
Qty: 30 TABLET | Refills: 0 | Status: SHIPPED | OUTPATIENT
Start: 2024-03-06 | End: 2024-03-13

## 2024-03-06 RX ORDER — ACETAMINOPHEN 500 MG
1000 TABLET ORAL ONCE
Status: COMPLETED | OUTPATIENT
Start: 2024-03-06 | End: 2024-03-06

## 2024-03-06 RX ORDER — CALCIUM CARBONATE 1000 MG/1
2000 TABLET, CHEWABLE ORAL EVERY MORNING
Qty: 90 TABLET | Refills: 3 | Status: SHIPPED | OUTPATIENT
Start: 2024-03-06

## 2024-03-06 RX ORDER — PANTOPRAZOLE SODIUM 40 MG/1
40 TABLET, DELAYED RELEASE ORAL EVERY MORNING
Status: DISCONTINUED | OUTPATIENT
Start: 2024-03-06 | End: 2024-03-06

## 2024-03-06 RX ORDER — ALPRAZOLAM 1 MG/1
1 TABLET ORAL 3 TIMES DAILY PRN
Status: DISCONTINUED | OUTPATIENT
Start: 2024-03-06 | End: 2024-03-06 | Stop reason: HOSPADM

## 2024-03-06 RX ORDER — DIPHENHYDRAMINE HYDROCHLORIDE 50 MG/ML
25 INJECTION INTRAMUSCULAR; INTRAVENOUS EVERY 6 HOURS PRN
Status: DISCONTINUED | OUTPATIENT
Start: 2024-03-06 | End: 2024-03-06 | Stop reason: HOSPADM

## 2024-03-06 RX ORDER — DIPHENHYDRAMINE HYDROCHLORIDE 50 MG/ML
12.5 INJECTION INTRAMUSCULAR; INTRAVENOUS
Status: DISCONTINUED | OUTPATIENT
Start: 2024-03-06 | End: 2024-03-06 | Stop reason: HOSPADM

## 2024-03-06 RX ORDER — OMEPRAZOLE 20 MG/1
20 CAPSULE, DELAYED RELEASE ORAL DAILY
Status: DISCONTINUED | OUTPATIENT
Start: 2024-03-06 | End: 2024-03-06 | Stop reason: HOSPADM

## 2024-03-06 RX ORDER — OXYCODONE HCL 5 MG/5 ML
10 SOLUTION, ORAL ORAL
Status: COMPLETED | OUTPATIENT
Start: 2024-03-06 | End: 2024-03-06

## 2024-03-06 RX ORDER — HYDROMORPHONE HYDROCHLORIDE 1 MG/ML
0.1 INJECTION, SOLUTION INTRAMUSCULAR; INTRAVENOUS; SUBCUTANEOUS
Status: DISCONTINUED | OUTPATIENT
Start: 2024-03-06 | End: 2024-03-06 | Stop reason: HOSPADM

## 2024-03-06 RX ORDER — EPHEDRINE SULFATE 50 MG/ML
INJECTION, SOLUTION INTRAVENOUS PRN
Status: DISCONTINUED | OUTPATIENT
Start: 2024-03-06 | End: 2024-03-06 | Stop reason: SURG

## 2024-03-06 RX ORDER — HYDROMORPHONE HYDROCHLORIDE 1 MG/ML
0.2 INJECTION, SOLUTION INTRAMUSCULAR; INTRAVENOUS; SUBCUTANEOUS
Status: DISCONTINUED | OUTPATIENT
Start: 2024-03-06 | End: 2024-03-06 | Stop reason: HOSPADM

## 2024-03-06 RX ORDER — ONDANSETRON 4 MG/1
4 TABLET, ORALLY DISINTEGRATING ORAL EVERY 6 HOURS PRN
Status: DISCONTINUED | OUTPATIENT
Start: 2024-03-06 | End: 2024-03-06 | Stop reason: HOSPADM

## 2024-03-06 RX ORDER — ONDANSETRON 2 MG/ML
4 INJECTION INTRAMUSCULAR; INTRAVENOUS
Status: DISCONTINUED | OUTPATIENT
Start: 2024-03-06 | End: 2024-03-06 | Stop reason: HOSPADM

## 2024-03-06 RX ORDER — MIDAZOLAM HYDROCHLORIDE 1 MG/ML
INJECTION INTRAMUSCULAR; INTRAVENOUS PRN
Status: DISCONTINUED | OUTPATIENT
Start: 2024-03-06 | End: 2024-03-06 | Stop reason: SURG

## 2024-03-06 RX ORDER — SODIUM CHLORIDE, SODIUM LACTATE, POTASSIUM CHLORIDE, CALCIUM CHLORIDE 600; 310; 30; 20 MG/100ML; MG/100ML; MG/100ML; MG/100ML
INJECTION, SOLUTION INTRAVENOUS CONTINUOUS
Status: DISCONTINUED | OUTPATIENT
Start: 2024-03-06 | End: 2024-03-06 | Stop reason: HOSPADM

## 2024-03-06 RX ORDER — HYDRALAZINE HYDROCHLORIDE 20 MG/ML
5 INJECTION INTRAMUSCULAR; INTRAVENOUS
Status: DISCONTINUED | OUTPATIENT
Start: 2024-03-06 | End: 2024-03-06 | Stop reason: HOSPADM

## 2024-03-06 RX ORDER — ONDANSETRON 2 MG/ML
INJECTION INTRAMUSCULAR; INTRAVENOUS PRN
Status: DISCONTINUED | OUTPATIENT
Start: 2024-03-06 | End: 2024-03-06 | Stop reason: SURG

## 2024-03-06 RX ORDER — ONDANSETRON 2 MG/ML
4 INJECTION INTRAMUSCULAR; INTRAVENOUS EVERY 4 HOURS PRN
Status: DISCONTINUED | OUTPATIENT
Start: 2024-03-06 | End: 2024-03-06 | Stop reason: HOSPADM

## 2024-03-06 RX ORDER — HYDROXYZINE 50 MG/1
50 TABLET, FILM COATED ORAL
Status: DISCONTINUED | OUTPATIENT
Start: 2024-03-06 | End: 2024-03-06 | Stop reason: HOSPADM

## 2024-03-06 RX ORDER — HYDROMORPHONE HYDROCHLORIDE 1 MG/ML
0.4 INJECTION, SOLUTION INTRAMUSCULAR; INTRAVENOUS; SUBCUTANEOUS
Status: DISCONTINUED | OUTPATIENT
Start: 2024-03-06 | End: 2024-03-06 | Stop reason: HOSPADM

## 2024-03-06 RX ORDER — HALOPERIDOL 5 MG/ML
1 INJECTION INTRAMUSCULAR
Status: DISCONTINUED | OUTPATIENT
Start: 2024-03-06 | End: 2024-03-06 | Stop reason: HOSPADM

## 2024-03-06 RX ORDER — CALCIUM CARBONATE 500 MG/1
2000 TABLET, CHEWABLE ORAL ONCE
Status: COMPLETED | OUTPATIENT
Start: 2024-03-06 | End: 2024-03-06

## 2024-03-06 RX ORDER — IPRATROPIUM BROMIDE AND ALBUTEROL SULFATE 2.5; .5 MG/3ML; MG/3ML
3 SOLUTION RESPIRATORY (INHALATION)
Status: DISCONTINUED | OUTPATIENT
Start: 2024-03-06 | End: 2024-03-06 | Stop reason: HOSPADM

## 2024-03-06 RX ORDER — ACETAMINOPHEN 325 MG/1
650 TABLET ORAL EVERY 6 HOURS PRN
Status: DISCONTINUED | OUTPATIENT
Start: 2024-03-06 | End: 2024-03-06 | Stop reason: HOSPADM

## 2024-03-06 RX ORDER — HYDROCODONE BITARTRATE AND ACETAMINOPHEN 5; 325 MG/1; MG/1
1 TABLET ORAL EVERY 6 HOURS PRN
Qty: 30 TABLET | Refills: 0 | Status: SHIPPED | OUTPATIENT
Start: 2024-03-06 | End: 2024-03-13

## 2024-03-06 RX ORDER — SCOLOPAMINE TRANSDERMAL SYSTEM 1 MG/1
1 PATCH, EXTENDED RELEASE TRANSDERMAL
Status: DISCONTINUED | OUTPATIENT
Start: 2024-03-06 | End: 2024-03-06 | Stop reason: HOSPADM

## 2024-03-06 RX ORDER — CLINDAMYCIN PHOSPHATE 900 MG/50ML
INJECTION, SOLUTION INTRAVENOUS
Status: DISPENSED
Start: 2024-03-06 | End: 2024-03-06

## 2024-03-06 RX ORDER — MEPERIDINE HYDROCHLORIDE 25 MG/ML
12.5 INJECTION INTRAMUSCULAR; INTRAVENOUS; SUBCUTANEOUS
Status: DISCONTINUED | OUTPATIENT
Start: 2024-03-06 | End: 2024-03-06 | Stop reason: HOSPADM

## 2024-03-06 RX ORDER — DEXAMETHASONE SODIUM PHOSPHATE 4 MG/ML
INJECTION, SOLUTION INTRA-ARTICULAR; INTRALESIONAL; INTRAMUSCULAR; INTRAVENOUS; SOFT TISSUE PRN
Status: DISCONTINUED | OUTPATIENT
Start: 2024-03-06 | End: 2024-03-06 | Stop reason: SURG

## 2024-03-06 RX ORDER — ROCURONIUM BROMIDE 10 MG/ML
INJECTION, SOLUTION INTRAVENOUS PRN
Status: DISCONTINUED | OUTPATIENT
Start: 2024-03-06 | End: 2024-03-06 | Stop reason: SURG

## 2024-03-06 RX ORDER — ALBUTEROL SULFATE 90 UG/1
2 AEROSOL, METERED RESPIRATORY (INHALATION) EVERY 4 HOURS PRN
Status: DISCONTINUED | OUTPATIENT
Start: 2024-03-06 | End: 2024-03-06 | Stop reason: HOSPADM

## 2024-03-06 RX ORDER — TEMAZEPAM 15 MG/1
15 CAPSULE ORAL NIGHTLY PRN
Status: DISCONTINUED | OUTPATIENT
Start: 2024-03-06 | End: 2024-03-06 | Stop reason: HOSPADM

## 2024-03-06 RX ORDER — LEVOTHYROXINE SODIUM 0.12 MG/1
125 TABLET ORAL
Status: DISCONTINUED | OUTPATIENT
Start: 2024-03-06 | End: 2024-03-06 | Stop reason: HOSPADM

## 2024-03-06 RX ORDER — TIZANIDINE 4 MG/1
2 TABLET ORAL EVERY 12 HOURS PRN
Status: DISCONTINUED | OUTPATIENT
Start: 2024-03-06 | End: 2024-03-06 | Stop reason: HOSPADM

## 2024-03-06 RX ORDER — DEXMEDETOMIDINE HYDROCHLORIDE 100 UG/ML
INJECTION, SOLUTION INTRAVENOUS PRN
Status: DISCONTINUED | OUTPATIENT
Start: 2024-03-06 | End: 2024-03-06 | Stop reason: SURG

## 2024-03-06 RX ORDER — MAGNESIUM OXIDE/MAG AA CHELATE 300 MG
1 CAPSULE ORAL
Status: DISCONTINUED | OUTPATIENT
Start: 2024-03-06 | End: 2024-03-06

## 2024-03-06 RX ORDER — OXYCODONE HCL 5 MG/5 ML
5 SOLUTION, ORAL ORAL
Status: COMPLETED | OUTPATIENT
Start: 2024-03-06 | End: 2024-03-06

## 2024-03-06 RX ORDER — DEXAMETHASONE SODIUM PHOSPHATE 4 MG/ML
4 INJECTION, SOLUTION INTRA-ARTICULAR; INTRALESIONAL; INTRAMUSCULAR; INTRAVENOUS; SOFT TISSUE
Status: DISCONTINUED | OUTPATIENT
Start: 2024-03-06 | End: 2024-03-06 | Stop reason: HOSPADM

## 2024-03-06 RX ORDER — CALCIUM CARBONATE 500 MG/1
2000 TABLET, CHEWABLE ORAL
Status: DISCONTINUED | OUTPATIENT
Start: 2024-03-06 | End: 2024-03-06 | Stop reason: HOSPADM

## 2024-03-06 RX ORDER — HYDROCODONE BITARTRATE AND ACETAMINOPHEN 5; 325 MG/1; MG/1
1 TABLET ORAL
Status: DISCONTINUED | OUTPATIENT
Start: 2024-03-06 | End: 2024-03-06 | Stop reason: HOSPADM

## 2024-03-06 RX ORDER — LIDOCAINE HYDROCHLORIDE 20 MG/ML
INJECTION, SOLUTION EPIDURAL; INFILTRATION; INTRACAUDAL; PERINEURAL PRN
Status: DISCONTINUED | OUTPATIENT
Start: 2024-03-06 | End: 2024-03-06 | Stop reason: SURG

## 2024-03-06 RX ORDER — ALBUTEROL SULFATE 2.5 MG/3ML
2.5 SOLUTION RESPIRATORY (INHALATION) EVERY 4 HOURS PRN
Status: DISCONTINUED | OUTPATIENT
Start: 2024-03-06 | End: 2024-03-06

## 2024-03-06 RX ORDER — LEVOTHYROXINE SODIUM 0.12 MG/1
125 TABLET ORAL
Qty: 30 TABLET | Refills: 3 | Status: SHIPPED | OUTPATIENT
Start: 2024-03-06

## 2024-03-06 RX ORDER — SODIUM CHLORIDE, SODIUM LACTATE, POTASSIUM CHLORIDE, CALCIUM CHLORIDE 600; 310; 30; 20 MG/100ML; MG/100ML; MG/100ML; MG/100ML
INJECTION, SOLUTION INTRAVENOUS CONTINUOUS
Status: ACTIVE | OUTPATIENT
Start: 2024-03-06 | End: 2024-03-06

## 2024-03-06 RX ORDER — HYDROCODONE BITARTRATE AND ACETAMINOPHEN 5; 325 MG/1; MG/1
1 TABLET ORAL EVERY 6 HOURS PRN
Status: DISCONTINUED | OUTPATIENT
Start: 2024-03-06 | End: 2024-03-06

## 2024-03-06 RX ADMIN — HYDROCODONE BITARTRATE AND ACETAMINOPHEN 1 TABLET: 5; 325 TABLET ORAL at 12:04

## 2024-03-06 RX ADMIN — DEXMEDETOMIDINE HYDROCHLORIDE 20 MCG: 100 INJECTION, SOLUTION INTRAVENOUS at 07:34

## 2024-03-06 RX ADMIN — DEXAMETHASONE SODIUM PHOSPHATE 8 MG: 4 INJECTION INTRA-ARTICULAR; INTRALESIONAL; INTRAMUSCULAR; INTRAVENOUS; SOFT TISSUE at 07:39

## 2024-03-06 RX ADMIN — FENTANYL CITRATE 50 MCG: 50 INJECTION, SOLUTION INTRAMUSCULAR; INTRAVENOUS at 08:09

## 2024-03-06 RX ADMIN — ACETAMINOPHEN 1000 MG: 500 TABLET ORAL at 06:18

## 2024-03-06 RX ADMIN — OXYCODONE HYDROCHLORIDE 10 MG: 5 SOLUTION ORAL at 08:53

## 2024-03-06 RX ADMIN — PROPOFOL 30 MG: 10 INJECTION, EMULSION INTRAVENOUS at 08:24

## 2024-03-06 RX ADMIN — LEVOTHYROXINE SODIUM 125 MCG: 0.12 TABLET ORAL at 12:04

## 2024-03-06 RX ADMIN — FENTANYL CITRATE 50 MCG: 50 INJECTION, SOLUTION INTRAMUSCULAR; INTRAVENOUS at 09:11

## 2024-03-06 RX ADMIN — FENTANYL CITRATE 50 MCG: 50 INJECTION, SOLUTION INTRAMUSCULAR; INTRAVENOUS at 07:36

## 2024-03-06 RX ADMIN — FENTANYL CITRATE 50 MCG: 50 INJECTION, SOLUTION INTRAMUSCULAR; INTRAVENOUS at 07:25

## 2024-03-06 RX ADMIN — FENTANYL CITRATE 50 MCG: 50 INJECTION, SOLUTION INTRAMUSCULAR; INTRAVENOUS at 08:24

## 2024-03-06 RX ADMIN — SODIUM CHLORIDE, POTASSIUM CHLORIDE, SODIUM LACTATE AND CALCIUM CHLORIDE: 600; 310; 30; 20 INJECTION, SOLUTION INTRAVENOUS at 06:19

## 2024-03-06 RX ADMIN — ALPRAZOLAM 1 MG: 0.25 TABLET ORAL at 09:56

## 2024-03-06 RX ADMIN — GABAPENTIN 300 MG: 300 CAPSULE ORAL at 06:18

## 2024-03-06 RX ADMIN — PROPOFOL 50 MG: 10 INJECTION, EMULSION INTRAVENOUS at 07:47

## 2024-03-06 RX ADMIN — PROPOFOL 200 MG: 10 INJECTION, EMULSION INTRAVENOUS at 07:31

## 2024-03-06 RX ADMIN — DEXMEDETOMIDINE HYDROCHLORIDE 20 MCG: 100 INJECTION, SOLUTION INTRAVENOUS at 07:38

## 2024-03-06 RX ADMIN — HYDROCODONE BITARTRATE AND ACETAMINOPHEN 1 TABLET: 5; 325 TABLET ORAL at 17:53

## 2024-03-06 RX ADMIN — CLINDAMYCIN PHOSPHATE 900 MG: 150 INJECTION, SOLUTION INTRAMUSCULAR; INTRAVENOUS at 07:31

## 2024-03-06 RX ADMIN — ANTACID TABLETS 2000 MG: 500 TABLET, CHEWABLE ORAL at 14:55

## 2024-03-06 RX ADMIN — MIDAZOLAM HYDROCHLORIDE 2 MG: 1 INJECTION, SOLUTION INTRAMUSCULAR; INTRAVENOUS at 07:25

## 2024-03-06 RX ADMIN — PROPOFOL 30 MG: 10 INJECTION, EMULSION INTRAVENOUS at 08:22

## 2024-03-06 RX ADMIN — FENTANYL CITRATE 50 MCG: 50 INJECTION, SOLUTION INTRAMUSCULAR; INTRAVENOUS at 08:17

## 2024-03-06 RX ADMIN — OMEPRAZOLE 20 MG: 20 CAPSULE, DELAYED RELEASE ORAL at 12:04

## 2024-03-06 RX ADMIN — HYDROMORPHONE HYDROCHLORIDE 0.2 MG: 1 INJECTION, SOLUTION INTRAMUSCULAR; INTRAVENOUS; SUBCUTANEOUS at 09:45

## 2024-03-06 RX ADMIN — PROPOFOL 50 MG: 10 INJECTION, EMULSION INTRAVENOUS at 08:18

## 2024-03-06 RX ADMIN — ROCURONIUM BROMIDE 40 MG: 50 INJECTION, SOLUTION INTRAVENOUS at 07:31

## 2024-03-06 RX ADMIN — LIDOCAINE HYDROCHLORIDE 60 MG: 20 INJECTION, SOLUTION EPIDURAL; INFILTRATION; INTRACAUDAL at 07:31

## 2024-03-06 RX ADMIN — ONDANSETRON 4 MG: 2 INJECTION INTRAMUSCULAR; INTRAVENOUS at 08:21

## 2024-03-06 RX ADMIN — EPHEDRINE SULFATE 10 MG: 50 INJECTION, SOLUTION INTRAVENOUS at 08:27

## 2024-03-06 RX ADMIN — FENTANYL CITRATE 50 MCG: 50 INJECTION, SOLUTION INTRAMUSCULAR; INTRAVENOUS at 08:54

## 2024-03-06 RX ADMIN — EPHEDRINE SULFATE 10 MG: 50 INJECTION, SOLUTION INTRAVENOUS at 07:39

## 2024-03-06 RX ADMIN — SUGAMMADEX 200 MG: 100 INJECTION, SOLUTION INTRAVENOUS at 07:37

## 2024-03-06 RX ADMIN — HYDROMORPHONE HYDROCHLORIDE 0.2 MG: 1 INJECTION, SOLUTION INTRAMUSCULAR; INTRAVENOUS; SUBCUTANEOUS at 10:40

## 2024-03-06 ASSESSMENT — PAIN DESCRIPTION - PAIN TYPE
TYPE: SURGICAL PAIN

## 2024-03-06 ASSESSMENT — PAIN SCALES - GENERAL: PAIN_LEVEL: 5

## 2024-03-06 NOTE — ANESTHESIA TIME REPORT
Anesthesia Start and Stop Event Times       Date Time Event    3/6/2024 0705 Ready for Procedure     0721 Anesthesia Start     0839 Anesthesia Stop          Responsible Staff  03/06/24      Name Role Begin End    Jami Jimenez M.D. Anesth 0721 0839          Overtime Reason:  no overtime (within assigned shift)    Comments:

## 2024-03-06 NOTE — ANESTHESIA POSTPROCEDURE EVALUATION
Patient: Madelyn Robb    Procedure Summary       Date: 03/06/24 Room / Location: [facility] ROOM 23 / SURGERY SAME DAY [facility]    Anesthesia Start: 0721 Anesthesia Stop: 0839    Procedure: TOTAL THYROIDECTOMY; PARATHYROID AUTOTRANSPLANTATION (Neck) Diagnosis: (GRAVES DISEASE)    Surgeons: Lola Church M.D. Responsible Provider: Jami Jimenez M.D.    Anesthesia Type: general ASA Status: 2            Final Anesthesia Type: general  Last vitals  BP   Blood Pressure: 120/60    Temp   36.5 °C (97.7 °F)    Pulse   78   Resp   18    SpO2   99 %      Anesthesia Post Evaluation    Patient location during evaluation: PACU  Patient participation: complete - patient participated  Level of consciousness: awake and alert  Pain score: 5    Airway patency: patent  Anesthetic complications: no  Cardiovascular status: hemodynamically stable  Respiratory status: acceptable  Hydration status: euvolemic    PONV: none          No notable events documented.     Nurse Pain Score: 7 (NPRS)

## 2024-03-06 NOTE — PROGRESS NOTES
Pt brought from PACU in [facility],on arrival pt awake,a&ox4,neck incision is with dressing,pt ambulated,voided,had food,no n/v,poc explained.

## 2024-03-06 NOTE — ANESTHESIA PREPROCEDURE EVALUATION
Case: [id number] Date/Time: 03/06/24 0715    Procedure: TOTAL THYROIDECTOMY    Pre-op diagnosis: GRAVES DISEASE    Location: [facility] ROOM 23 / SURGERY SAME DAY [facility]    Surgeons: Lola Church M.D.            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      PULMONARY   (positive) COPD with acute exacerbation (HCC)   (positive) Healthcare-associated pneumonia      CARDIAC   (positive) Non-ST elevation myocardial infarction (NSTEMI), subsequent episode of care (HCC)      GI   (positive) GERD (gastroesophageal reflux disease)      ENDO   (positive) Hypothyroidism      Other   (positive) Acute on chronic respiratory failure with hypoxia (HCC)   (positive) Beta thalassemia minor   (positive) Bilateral hip pain   (positive) Cannabis use disorder, mild, abuse   (positive) Chronic low back pain   (positive) Graves disease   (positive) Narcotic dependence (HCC)   (positive) Severe sepsis ([facility])   (positive) Thyroid orbitopathy   (positive) Tobacco abuse       Physical Exam    Airway   Mallampati: II  TM distance: >3 FB  Neck ROM: full       Cardiovascular - normal exam  Rhythm: regular  Rate: normal  (-) murmur     Dental - normal exam  (+) upper dentures, lower dentures           Pulmonary - normal exam  Breath sounds clear to auscultation     Abdominal    Neurological - normal exam                   Anesthesia Plan    ASA 2       Plan - general       Airway plan will be ETT          Induction: intravenous    Postoperative Plan: Postoperative administration of opioids is intended.    Pertinent diagnostic labs and testing reviewed    Informed Consent:    Anesthetic plan and risks discussed with patient.    Use of blood products discussed with: patient whom consented to blood products.

## 2024-03-06 NOTE — PROGRESS NOTES
Discharge instructions after thyroidectomy:    You had surgery called thyroidectomy. This means that part or all of your thyroid gland was removed. The main job of the thyroid gland is to make thyroid hormone. This hormone controls your body’s metabolism. This is the way your body creates and uses energy. Removing the thyroid gland removes your body’s source of thyroid hormone. So after the surgery, you will need to take thyroid hormone pills daily. This helps keep the level of thyroid hormone in your body steady. This sheet tells you more about how to care for yourself after surgery.    Recovering After Surgery:    Get plenty of rest.    Incisional care: you have a waterproof dressing in place, OK to shower but do not submerge the incision or dressing. You may remove your dressing after 48 hours. OK to shower after the waterproof dressing is removed. No other specific care is required for your incision.Do not submerge the incision in the bath or other water for 2 weeks after surgery.    Avoid heavy lifting and strenuous activity until your follow up appointment.    Walk a few times daily. But don’t push yourself too hard. Slowly increase your pace and distance, as you feel able.    Return to work when your doctor says it’s okay.    Taking Your Thyroid Medication:    Take your medication as directed.    Use a pillbox labeled with the days of the week. This will help you remember whether you’ve taken your medication each day.    Take your medication with a liquid. But avoid taking it with soy milk. Soy milk can affect how well your body absorbs the medication. The pill needs to reach your stomach and not dissolve in your throat.    Try to take your medication with the same types and amounts of food and liquid each day. This helps control the amount of thyroid hormone in your system.    After taking your medication:    Wait 4 hours before eating or drinking anything that contains soy.    Wait 4 hours before taking  certain medications. These include:     Iron supplements     Calcium supplements     Antacids that contain either calcium or aluminum hydroxide     Medications that lower your cholesterol    Do not stop taking your medication even if you become pregnant.    Never stop taking medications on your own.    Take your TUMS as directed. Signs of low blood calcium can be tingling in your hands or feet or around your mouth. If you experience these symptoms, take more TUMS. If the symptoms persist despite this, please call the physician.    Laboratory Draw:  You have been provided with a prescription to have you calcium levels drawn prior to your follow up visit. The order is at the laboratory that you designated at as your lab of choice at the office. Please have this drawn ~2 days prior to your follow-up visit.    Keeping Your Doctor’s Appointments:    See your doctor for regular visits. These are needed to monitor your health.    Have routine blood tests done. These check the level of thyroid hormone in your body. This helps your doctor know whether to adjust the dosage of your medication if needed.    Tell your doctor about any signs of further thyroid problems.    Signs that you may have too much thyroid hormone in your body include:     Restlessness     Rapid weight loss     Sweating     Signs that you may have too little thyroid hormone in your body include:     Fatigue or sluggishness     Puffy hands, face, or feet     Hoarseness     Muscle pain     Slow pulse (less than 60 beats per minute)      When to Call Your Doctor:  Call your doctor right away if you have any of the following:  Fever above 100.4°F  Swelling or bleeding at the incision site  Choking  Trouble breathing  A sore throat that lasts longer than 7 days  Tingling or cramps in your hands, feet, or lips    FOLLOW-UP:  Please call my office at [phone] to make an appointment in 1 week    Office address:  [address] [place] [address] [id number]  [place]  [address]    Lola Church M.D.  [facility]  [phone]

## 2024-03-06 NOTE — OR NURSING
0836- Pt to PACU 1 from OR. Bedside report from anesthesiologist and RN.  Attached to monitoring, VSS, breathing is calm and unlabored, Patient is asleep currently. Pt has a dressing on anterior portion of neck  and CDI. Remains on 10 L oxygen via mask.    0853- Patient having 7/10 pain at this time. Medicated per MAR.     0900- Pt placed on 10 L oxymask per ERAS orders. Pt tolerating some water and jello.    0914- RN updated patients  at this time. All questions answered.     0945- Subsequent dose given for pain.     1051- Report given to Pauly ROMERO. Patient will be transferring to T 208.    1058- Patient up to restroom to void.     1108- Patient out of PACU to room. Transferred by transport. All belongings with patient.

## 2024-03-06 NOTE — ANESTHESIA PROCEDURE NOTES
Airway    Date/Time: 3/6/2024 7:32 AM    Performed by: Jami Jimenez M.D.  Authorized by: Jami Jimenez M.D.    Location:  OR  Urgency:  Elective  Difficult Airway: No    Indications for Airway Management:  Anesthesia      Spontaneous Ventilation: absent    Sedation Level:  Deep  Preoxygenated: Yes    Patient Position:  Sniffing  Mask Difficulty Assessment:  1 - vent by mask  Final Airway Type:  Endotracheal airway  Final Endotracheal Airway:  ETT  Cuffed: Yes    Technique Used for Successful ETT Placement:  Direct laryngoscopy    Insertion Site:  Oral  Blade Type:  David  Laryngoscope Blade/Videolaryngoscope Blade Size:  3  ETT Size (mm):  7.0  Measured from:  Teeth  ETT to Teeth (cm):  21  Placement Verified by: auscultation and capnometry    Cormack-Lehane Classification:  Grade I - full view of glottis  Number of Attempts at Approach:  1

## 2024-03-06 NOTE — OP REPORT
Operative Report    Date: 3/6/2024    Surgeon: Lola Church M.D.    Assistant: Mindy DANG    My assistant was medically necessary for this procedure. She placed the precordial electrodes of the NIMS and monitored the recurrent laryngeal nerves bilaterally throughout the procedure. She also by retracting tissues allowed me the surgical field visualization necessary for a safe surgery. Finally, she also assisted with hemostasis and wound closure.      Anesthesiologist: Tony RAINEY    Pre-operative Diagnosis: E05.00 Thyrotoxicosis with diffuse goiter without thyrotoxic crisis or storm (Graves Disease)      Post-operative Diagnosis: same     Procedure:   58738 Thyroidectomy, total or complete , bilateral recurrent laryngeal nerve monitoring  40673 parathyroid autotransplantation     Findings: small burned out appearing thyroid.      Procedure in detail: The patient was identified in the pre-operative holding area and brought to the operating room. Correct side and site were identified.  GETA was smoothly induced. The patient was prepped and draped in the usual sterile fashion.    With the patient in the supine position, the head of the bed slightly elevated, the neck slightly extended, and the patient intubated with a NIM endotracheal tube, the precordial electrodes were placed by the surgical assistant, who then monitored the recurrent laryngeal nerves bilaterally throughout the procedure.     The neck was prepared with betadiene and draped in the usual fashion. The neck was entered through a lower transverse cervical incision and carried down through the platysma. Subplatysmal flaps were dissected superiorly and inferiorly down to the sternal notch. The midline cervical fascia was incised down to the capsule of the thyroid. The strap muscles were mobilized off the left thyroid lobe, and the superior pole vessels progressively divided with the Harmonic. The thyroid veins were divided with the Harmonic. The  recurrent laryngeal nerve and both parathyroids were identified and protected. Branches of the inferior thyroid artery were divided on the capsule of the gland with the Harmonic. Smaller vessels were either divided with the Harmonic or, when near to the recurrent nerve, divided between clamps and suture ligated with 3-0 Vicryl suture as the gland was dissected free from the nerve and off the trachea. The gland was transected at the medial border of the right thyroid lobe and the left thyroid lobe and the isthmus were sent for permanent pathologic exam.    The strap muscles were then mobilized off of the right thyroid lobe, and the superior pole vessels progressively divided with the Harmonic. The middle and inferior thyroid veins were divided with the Harmonic. The recurrent laryngeal nerve and both parathyroids were identified and protected.    While mobilizing the right inferior parathyroid off the thyroid lobe, it was divested of its blood supply, so it was cut into very small pieces and autotransplanted into the right strap muscle.     Branches of the inferior thyroid artery were divided on the capsule of the gland with the Harminic. Smaller vessels were either divided with the Harmonic or, when close to the nerve, divided between clamps and suture ligated with 3-0 Vicryl as the gland was dissected free of the nerve and off of the trachea. The right  thyroid lobe was sent for permanent pathology. Good hemostasis was assured.     The integrity of both recurrent laryngeal nerves was documented. Hemoblast was placed in both sides of the neck. The midline cervical fascia was reapproximated with running 3-0 Vicryl. Platysma was reapproximated with interrupted 3-0 Vicryl. The skin was closed with monocryl.     The patient was awakened from general anesthetic, and was taken to the recovery room in stable condition.    Sponge and needle counts were correct at the end of the case.     Specimen:   left thyroid lobe and  isthmus  right thyroid lobe    EBL: minimal    Dispo: stable, extubated, to PACU    Lola Church M.D.

## 2024-03-07 NOTE — PROGRESS NOTES
Patient discharged home with family. Ambulatory with steady gait. Discharge paperwork discussed with patient, discharge instructions given to patient for reference. PIV removed prior to leaving.

## 2024-03-07 NOTE — DISCHARGE INSTRUCTIONS
Discharge instructions after thyroidectomy:     You had surgery called thyroidectomy. This means that part or all of your thyroid gland was removed. The main job of the thyroid gland is to make thyroid hormone. This hormone controls your body’s metabolism. This is the way your body creates and uses energy. Removing the thyroid gland removes your body’s source of thyroid hormone. So after the surgery, you will need to take thyroid hormone pills daily. This helps keep the level of thyroid hormone in your body steady. This sheet tells you more about how to care for yourself after surgery.     Recovering After Surgery:     Get plenty of rest.     Incisional care: you have a waterproof dressing in place, OK to shower but do not submerge the incision or dressing. You may remove your dressing after 48 hours. OK to shower after the waterproof dressing is removed. No other specific care is required for your incision.Do not submerge the incision in the bath or other water for 2 weeks after surgery.     Avoid heavy lifting and strenuous activity until your follow up appointment.     Walk a few times daily. But don’t push yourself too hard. Slowly increase your pace and distance, as you feel able.     Return to work when your doctor says it’s okay.     Taking Your Thyroid Medication:     Take your medication as directed.     Use a pillbox labeled with the days of the week. This will help you remember whether you’ve taken your medication each day.     Take your medication with a liquid. But avoid taking it with soy milk. Soy milk can affect how well your body absorbs the medication. The pill needs to reach your stomach and not dissolve in your throat.     Try to take your medication with the same types and amounts of food and liquid each day. This helps control the amount of thyroid hormone in your system.     After taking your medication:     Wait 4 hours before eating or drinking anything that contains soy.     Wait 4  hours before taking certain medications. These include:                   Iron supplements                   Calcium supplements                   Antacids that contain either calcium or aluminum hydroxide                   Medications that lower your cholesterol     Do not stop taking your medication even if you become pregnant.     Never stop taking medications on your own.     Take your TUMS as directed. Signs of low blood calcium can be tingling in your hands or feet or around your mouth. If you experience these symptoms, take more TUMS. If the symptoms persist despite this, please call the physician.     Laboratory Draw:  You have been provided with a prescription to have you calcium levels drawn prior to your follow up visit. The order is at the laboratory that you designated at as your lab of choice at the office. Please have this drawn ~2 days prior to your follow-up visit.     Keeping Your Doctor’s Appointments:     See your doctor for regular visits. These are needed to monitor your health.     Have routine blood tests done. These check the level of thyroid hormone in your body. This helps your doctor know whether to adjust the dosage of your medication if needed.     Tell your doctor about any signs of further thyroid problems.     Signs that you may have too much thyroid hormone in your body include:                   Restlessness                   Rapid weight loss                   Sweating                   Signs that you may have too little thyroid hormone in your body include:                   Fatigue or sluggishness                   Puffy hands, face, or feet                   Hoarseness                   Muscle pain                   Slow pulse (less than 60 beats per minute)        When to Call Your Doctor:  Call your doctor right away if you have any of the following:  Fever above 100.4°F  Swelling or bleeding at the incision site  Choking  Trouble breathing  A sore throat that lasts longer  than 7 days  Tingling or cramps in your hands, feet, or lips     FOLLOW-UP:  Please call my office at [phone] to make an appointment in 1 week     Office address:  [address] [place] [address] [id number]  [place] [address]     Lola Church M.D.  [facility]  [phone]

## 2024-03-07 NOTE — PROGRESS NOTES
4 Eyes Skin Assessment Completed by 2 RN Pauly/Sivan     Head WDL  Ears WDL  Nose WDL  Mouth WDL  Neck INCISION WITH DRESSING  Breast/Chest WDL  Shoulder Blades WDL  Spine WDL  (R) Arm/Elbow/Hand  WNL  (L) Arm/Elbow-  Abdomen WDL  Groin WDL  Scrotum/Coccyx/Buttocks WDL  (R) Leg dog bite angus.  (L) Leg WDL  (R) Heel/Foot/Toe WDL  (L) Heel/Foot/Toe WDL   Devices In Place : Pulse Ox  Interventions In Place Pillows  Possible Skin Injury No     Pictures Uploaded Into Epic N/A  Wound Consult Placed N/A  RN Wound Prevention Protocol Ordered No

## 2024-03-07 NOTE — PROGRESS NOTES
Pt anxious to go home,explained dc plan depends on calcium result,awaiting for calcium result,lab drawn.

## 2024-10-07 ENCOUNTER — APPOINTMENT (OUTPATIENT)
Dept: OPHTHALMOLOGY | Facility: MEDICAL CENTER | Age: 49
End: 2024-10-07
Payer: COMMERCIAL

## 2024-10-23 ENCOUNTER — NON-PROVIDER VISIT (OUTPATIENT)
Dept: OCCUPATIONAL MEDICINE | Facility: CLINIC | Age: 49
End: 2024-10-23

## 2024-10-23 ENCOUNTER — HOSPITAL ENCOUNTER (OUTPATIENT)
Facility: MEDICAL CENTER | Age: 49
End: 2024-10-23
Attending: NURSE PRACTITIONER
Payer: COMMERCIAL

## 2024-10-23 DIAGNOSIS — Z02.89 ENCOUNTER FOR OCCUPATIONAL HEALTH ASSESSMENT: ICD-10-CM

## 2024-10-23 PROCEDURE — 86480 TB TEST CELL IMMUN MEASURE: CPT | Performed by: NURSE PRACTITIONER

## 2024-10-24 LAB
GAMMA INTERFERON BACKGROUND BLD IA-ACNC: 0.05 IU/ML
M TB IFN-G BLD-IMP: NEGATIVE
M TB IFN-G CD4+ BCKGRND COR BLD-ACNC: -0.01 IU/ML
MITOGEN IGNF BCKGRD COR BLD-ACNC: >10 IU/ML
QFT TB2 - NIL TBQ2: 0 IU/ML

## 2025-02-11 ENCOUNTER — OFFICE VISIT (OUTPATIENT)
Dept: OPHTHALMOLOGY | Facility: MEDICAL CENTER | Age: 50
End: 2025-02-11
Payer: MEDICAID

## 2025-02-11 DIAGNOSIS — H52.13 MYOPIA OF BOTH EYES: ICD-10-CM

## 2025-02-11 DIAGNOSIS — H47.099 OPTIC NEUROPATHY, COMPRESSIVE: ICD-10-CM

## 2025-02-11 DIAGNOSIS — H40.003 GLAUCOMA SUSPECT OF BOTH EYES: ICD-10-CM

## 2025-02-11 DIAGNOSIS — H05.20 PROPTOSIS: ICD-10-CM

## 2025-02-11 DIAGNOSIS — E07.9 THYROID ORBITOPATHY: ICD-10-CM

## 2025-02-11 DIAGNOSIS — H05.89 THYROID ORBITOPATHY: ICD-10-CM

## 2025-02-11 DIAGNOSIS — S02.85XS CLOSED FRACTURE OF ORBIT, SEQUELA (HCC): ICD-10-CM

## 2025-02-11 DIAGNOSIS — E05.00 GRAVES DISEASE: ICD-10-CM

## 2025-02-11 PROCEDURE — 92250 FUNDUS PHOTOGRAPHY W/I&R: CPT | Performed by: OPHTHALMOLOGY

## 2025-02-11 PROCEDURE — 99213 OFFICE O/P EST LOW 20 MIN: CPT | Mod: 25 | Performed by: OPHTHALMOLOGY

## 2025-02-11 ASSESSMENT — REFRACTION_WEARINGRX
OS_SPHERE: -1.50
OD_CYLINDER: +1.25
OD_CYLINDER: +1.50
OS_AXIS: 080
OS_CYLINDER: +1.25
OD_SPHERE: -1.00
OS_ADD: +2.25
OD_ADD: +2.25
OS_SPHERE: -1.25
OD_ADD: +2.00
OD_SPHERE: -0.75
OS_AXIS: 077
OD_AXIS: 109
SPECS_TYPE: PAL
OS_CYLINDER: +1.50
OS_ADD: +2.00
OD_AXIS: 105

## 2025-02-11 ASSESSMENT — REFRACTION_MANIFEST
OS_CYLINDER: +2.00
OD_AXIS: 102
METHOD_AUTOREFRACTION: 1
OS_AXIS: 081
OD_SPHERE: -0.75
OS_SPHERE: -1.00
OD_CYLINDER: +2.50

## 2025-02-11 ASSESSMENT — EXTERNAL EXAM - LEFT EYE: OS_EXAM: 2+ PROPTOSIS

## 2025-02-11 ASSESSMENT — EXTERNAL EXAM - RIGHT EYE: OD_EXAM: 2+ PROPTOSIS

## 2025-02-11 ASSESSMENT — CONF VISUAL FIELD
OD_SUPERIOR_NASAL_RESTRICTION: 0
OS_SUPERIOR_NASAL_RESTRICTION: 0
OS_SUPERIOR_TEMPORAL_RESTRICTION: 0
OS_NORMAL: 1
OD_SUPERIOR_TEMPORAL_RESTRICTION: 0
OD_INFERIOR_NASAL_RESTRICTION: 0
OS_INFERIOR_TEMPORAL_RESTRICTION: 0
OD_INFERIOR_TEMPORAL_RESTRICTION: 0
OD_NORMAL: 1
OS_INFERIOR_NASAL_RESTRICTION: 0

## 2025-02-11 ASSESSMENT — VISUAL ACUITY
METHOD: SNELLEN - LINEAR
OD_SC+: -2
OD_SC: 20/50
OS_SC+: -1
OS_SC: 20/40

## 2025-02-11 ASSESSMENT — CUP TO DISC RATIO
OD_RATIO: 0.4
OS_RATIO: 0.4

## 2025-02-11 ASSESSMENT — TONOMETRY
IOP_METHOD: ICARE
OS_IOP_MMHG: 21
OD_IOP_MMHG: 20

## 2025-02-11 ASSESSMENT — SLIT LAMP EXAM - LIDS
COMMENTS: NORMAL
COMMENTS: NORMAL

## 2025-02-11 NOTE — ASSESSMENT & PLAN NOTE
7/25/2023- history of orbital fractures exacerbating EOM restriction. States that in the past saw plastic surgeon and given extent recommended not preceding with any surgical repair.   2/11/2025-motility improved following thyroidectomy

## 2025-02-11 NOTE — ASSESSMENT & PLAN NOTE
7/25/2023 - uncorrected myopia. Will give rx with plus add.  2/11/2025-slight adjustment in glasses Rx
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
## 2025-02-11 NOTE — ASSESSMENT & PLAN NOTE
7/25/2023- Significant thyroid orbitopathy. % hears ago had severe chemosis, proptosis, worsening ophthalmoplegia and compressive orbitopathy. Was treated with high does intravenous then oral steroids. This improved the chemosis and only some of the proptosis, but still significant EOM restriction, proptosis, orbital discomfort, eyelid swelling with retraction, and pain on eye movement. TAMARA score of 5. Therefore is a candidate for Tepezza. Gave information and discussed th risks, benefits and side effects. Would like to precede. Will obtain baseline audiogram.   2/11/2025-ended up having thyroidectomy in March 2024.  Overall things have stabilized.  Her extraocular motility has improved.  Her intraocular pressure is stable.  There is no evidence of progressive compressive optic atrophy.  Discussed at this point we will continue to monitor

## 2025-02-11 NOTE — ASSESSMENT & PLAN NOTE
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 Has evidence of some residual damage with area of segmental atrophy left nerve and OCT NFL thickness 87 OD and 76 OS  2/11/2025-no progressive compressing optic neuropathy since last visit.  OCT neurofibrillary thickness stable at 87 OD 74 OS

## 2025-06-03 DIAGNOSIS — Z00.6 CLINICAL TRIAL PARTICIPANT: ICD-10-CM

## 2025-07-09 NOTE — ASSESSMENT & PLAN NOTE
Related to asthma/COPD exacerbation  Initially on BiPAP, weaned off  Intensivist/pulmonary consulted  Antibiotics  Steroids, the patient was on high-dose prednisone and outpatient basis  Aggressive breathing treatment, steroid, RT protocol   Patient/Caregiver provided printed discharge information.

## (undated) DEVICE — SENSOR OXIMETER ADULT SPO2 RD SET (20EA/BX)

## (undated) DEVICE — FIBRILLAR SURGICEL 4X4 - 10/CA

## (undated) DEVICE — BOVIE NEEDLE TIP 3CM COLORADO

## (undated) DEVICE — CLIP MED INTNL HRZN TI ESCP - (25/BX)

## (undated) DEVICE — SUTURE GENERAL

## (undated) DEVICE — GOWN WARMING STANDARD FLEX - (30/CA)

## (undated) DEVICE — SLEEVE VASO CALF MED - (10PR/CA)

## (undated) DEVICE — CANNULA O2 COMFORT SOFT EAR ADULT 7 FT TUBING (50/CA)

## (undated) DEVICE — SHEET THYROID - (10EA/CA)

## (undated) DEVICE — CANISTER SUCTION 3000ML MECHANICAL FILTER AUTO SHUTOFF MEDI-VAC NONSTERILE LF DISP  (40EA/CA)

## (undated) DEVICE — TUBE EMG NIM TRIVANTAGE 7MM (3EA/PK)

## (undated) DEVICE — Device

## (undated) DEVICE — GLOVE BIOGEL INDICATOR SZ 7SURGICAL PF LTX - (50/BX 4BX/CA)

## (undated) DEVICE — SUTURE 3-0 VICRYL PLUS SH - 27 INCH (36/BX)

## (undated) DEVICE — SUCTION INSTRUMENT YANKAUER BULBOUS TIP W/O VENT (50EA/CA)

## (undated) DEVICE — LACTATED RINGERS INJ 1000 ML - (14EA/CA 60CA/PF)

## (undated) DEVICE — KIT  I.V. START (100EA/CA)

## (undated) DEVICE — TOWEL STOP TIMEOUT SAFETY FLAG (40EA/CA)

## (undated) DEVICE — SUTURE 4-0 MONOCRYL PLUS PS-2 - 27 INCH (36/BX)

## (undated) DEVICE — SPONGE PEANUT - (5/PK 50PK/CA)

## (undated) DEVICE — SET LEADWIRE 5 LEAD BEDSIDE DISPOSABLE ECG (1SET OF 5/EA)

## (undated) DEVICE — PROBE PRASS STAND STIMULATING (5EA/PK)

## (undated) DEVICE — CANISTER SUCTION RIGID RED 1500CC (40EA/CA)

## (undated) DEVICE — DRAPE MAGNETIC (INSTRA-MAG) - (30/CA)

## (undated) DEVICE — GLOVE SZ 6 BIOGEL PI MICRO - PF LF (50PR/BX 4BX/CA)

## (undated) DEVICE — SUTURE 3-0 VICRYL PLUS SH - 8X 18 INCH (12/BX)

## (undated) DEVICE — TUBING CLEARLINK DUO-VENT - C-FLO (48EA/CA)

## (undated) DEVICE — DRESSING TRANSPARENT FILM TEGADERM 4 X 4.75" (50EA/BX)"

## (undated) DEVICE — TUBE CONNECTING SUCTION - CLEAR PLASTIC STERILE 72 IN (50EA/CA)

## (undated) DEVICE — PEN SKIN MARKER W/RULER - (50EA/BX)

## (undated) DEVICE — MASK OXYGEN VNYL ADLT MED CONC WITH 7 FOOT TUBING  - (50EA/CA)

## (undated) DEVICE — GOWN SURGEONS LARGE - (32/CA)

## (undated) DEVICE — SODIUM CHL IRRIGATION 0.9% 1000ML (12EA/CA)

## (undated) DEVICE — SPONGE GAUZE STER 4X4 8-PL - (2/PK 50PK/BX 12BX/CS)

## (undated) DEVICE — SHEAR HS FOCUS 9CM CVD - (6/BX)

## (undated) DEVICE — CLIP SM INTNL HRZN TI ESCP LGT - (24EA/PK 25PK/BX)